# Patient Record
Sex: MALE | Race: WHITE | NOT HISPANIC OR LATINO | Employment: OTHER | URBAN - METROPOLITAN AREA
[De-identification: names, ages, dates, MRNs, and addresses within clinical notes are randomized per-mention and may not be internally consistent; named-entity substitution may affect disease eponyms.]

---

## 2017-03-29 ENCOUNTER — ALLSCRIPTS OFFICE VISIT (OUTPATIENT)
Dept: OTHER | Facility: OTHER | Age: 62
End: 2017-03-29

## 2017-03-29 ENCOUNTER — APPOINTMENT (OUTPATIENT)
Dept: LAB | Facility: CLINIC | Age: 62
End: 2017-03-29
Payer: COMMERCIAL

## 2017-03-29 DIAGNOSIS — E78.5 HYPERLIPIDEMIA: ICD-10-CM

## 2017-03-29 DIAGNOSIS — I10 ESSENTIAL (PRIMARY) HYPERTENSION: ICD-10-CM

## 2017-03-29 DIAGNOSIS — Z12.11 ENCOUNTER FOR SCREENING FOR MALIGNANT NEOPLASM OF COLON: ICD-10-CM

## 2017-03-29 DIAGNOSIS — G40.209 LOCALZ-RLTD SYMPTOMATIC EPILEPSY W CMPLX PART SZ, NOTINTRAC, WO STATUS (HCC): ICD-10-CM

## 2017-03-29 DIAGNOSIS — E55.9 VITAMIN D DEFICIENCY: ICD-10-CM

## 2017-03-29 DIAGNOSIS — I71.2 THORACIC AORTIC ANEURYSM WITHOUT RUPTURE (HCC): ICD-10-CM

## 2017-03-29 LAB
25(OH)D3 SERPL-MCNC: 31.9 NG/ML (ref 30–100)
ALBUMIN SERPL BCP-MCNC: 3.8 G/DL (ref 3.5–5)
ALP SERPL-CCNC: 47 U/L (ref 46–116)
ALT SERPL W P-5'-P-CCNC: 19 U/L (ref 12–78)
ANION GAP SERPL CALCULATED.3IONS-SCNC: 9 MMOL/L (ref 4–13)
AST SERPL W P-5'-P-CCNC: 15 U/L (ref 5–45)
BASOPHILS # BLD AUTO: 0.04 THOUSANDS/ΜL (ref 0–0.1)
BASOPHILS NFR BLD AUTO: 1 % (ref 0–1)
BILIRUB SERPL-MCNC: 0.56 MG/DL (ref 0.2–1)
BUN SERPL-MCNC: 17 MG/DL (ref 5–25)
CALCIUM SERPL-MCNC: 8.7 MG/DL (ref 8.3–10.1)
CHLORIDE SERPL-SCNC: 104 MMOL/L (ref 100–108)
CHOLEST SERPL-MCNC: 147 MG/DL (ref 50–200)
CO2 SERPL-SCNC: 27 MMOL/L (ref 21–32)
CREAT SERPL-MCNC: 0.8 MG/DL (ref 0.6–1.3)
EOSINOPHIL # BLD AUTO: 0.1 THOUSAND/ΜL (ref 0–0.61)
EOSINOPHIL NFR BLD AUTO: 2 % (ref 0–6)
ERYTHROCYTE [DISTWIDTH] IN BLOOD BY AUTOMATED COUNT: 13.1 % (ref 11.6–15.1)
GFR SERPL CREATININE-BSD FRML MDRD: >60 ML/MIN/1.73SQ M
GLUCOSE P FAST SERPL-MCNC: 83 MG/DL (ref 65–99)
HCT VFR BLD AUTO: 41 % (ref 36.5–49.3)
HDLC SERPL-MCNC: 41 MG/DL (ref 40–60)
HGB BLD-MCNC: 13.4 G/DL (ref 12–17)
LDLC SERPL CALC-MCNC: 97 MG/DL (ref 0–100)
LYMPHOCYTES # BLD AUTO: 1.45 THOUSANDS/ΜL (ref 0.6–4.47)
LYMPHOCYTES NFR BLD AUTO: 33 % (ref 14–44)
MCH RBC QN AUTO: 30.5 PG (ref 26.8–34.3)
MCHC RBC AUTO-ENTMCNC: 32.7 G/DL (ref 31.4–37.4)
MCV RBC AUTO: 93 FL (ref 82–98)
MONOCYTES # BLD AUTO: 0.4 THOUSAND/ΜL (ref 0.17–1.22)
MONOCYTES NFR BLD AUTO: 9 % (ref 4–12)
NEUTROPHILS # BLD AUTO: 2.35 THOUSANDS/ΜL (ref 1.85–7.62)
NEUTS SEG NFR BLD AUTO: 55 % (ref 43–75)
NRBC BLD AUTO-RTO: 0 /100 WBCS
PLATELET # BLD AUTO: 232 THOUSANDS/UL (ref 149–390)
PMV BLD AUTO: 11.1 FL (ref 8.9–12.7)
POTASSIUM SERPL-SCNC: 4.6 MMOL/L (ref 3.5–5.3)
PROT SERPL-MCNC: 6.9 G/DL (ref 6.4–8.2)
RBC # BLD AUTO: 4.39 MILLION/UL (ref 3.88–5.62)
SODIUM SERPL-SCNC: 140 MMOL/L (ref 136–145)
TRIGL SERPL-MCNC: 44 MG/DL
TSH SERPL DL<=0.05 MIU/L-ACNC: 2.49 UIU/ML (ref 0.36–3.74)
WBC # BLD AUTO: 4.34 THOUSAND/UL (ref 4.31–10.16)

## 2017-03-29 PROCEDURE — 80053 COMPREHEN METABOLIC PANEL: CPT

## 2017-03-29 PROCEDURE — 82306 VITAMIN D 25 HYDROXY: CPT

## 2017-03-29 PROCEDURE — 36415 COLL VENOUS BLD VENIPUNCTURE: CPT

## 2017-03-29 PROCEDURE — 85025 COMPLETE CBC W/AUTO DIFF WBC: CPT

## 2017-03-29 PROCEDURE — 84443 ASSAY THYROID STIM HORMONE: CPT

## 2017-03-29 PROCEDURE — 80061 LIPID PANEL: CPT

## 2017-03-31 ENCOUNTER — GENERIC CONVERSION - ENCOUNTER (OUTPATIENT)
Dept: OTHER | Facility: OTHER | Age: 62
End: 2017-03-31

## 2017-04-10 ENCOUNTER — ALLSCRIPTS OFFICE VISIT (OUTPATIENT)
Dept: OTHER | Facility: OTHER | Age: 62
End: 2017-04-10

## 2017-05-18 ENCOUNTER — ALLSCRIPTS OFFICE VISIT (OUTPATIENT)
Dept: OTHER | Facility: OTHER | Age: 62
End: 2017-05-18

## 2017-05-18 ENCOUNTER — TRANSCRIBE ORDERS (OUTPATIENT)
Dept: ADMINISTRATIVE | Facility: HOSPITAL | Age: 62
End: 2017-05-18

## 2017-05-18 DIAGNOSIS — M51.26 DISPLACEMENT OF LUMBAR INTERVERTEBRAL DISC WITHOUT MYELOPATHY: Primary | ICD-10-CM

## 2017-05-18 DIAGNOSIS — M48.061 NEURAL FORAMINAL STENOSIS OF LUMBAR SPINE: ICD-10-CM

## 2017-06-02 ENCOUNTER — GENERIC CONVERSION - ENCOUNTER (OUTPATIENT)
Dept: OTHER | Facility: OTHER | Age: 62
End: 2017-06-02

## 2017-06-08 ENCOUNTER — TRANSCRIBE ORDERS (OUTPATIENT)
Dept: NEUROLOGY | Facility: HOSPITAL | Age: 62
End: 2017-06-08

## 2017-06-08 ENCOUNTER — ALLSCRIPTS OFFICE VISIT (OUTPATIENT)
Dept: OTHER | Facility: OTHER | Age: 62
End: 2017-06-08

## 2017-06-08 DIAGNOSIS — M54.16 RADICULOPATHY OF LUMBAR REGION: ICD-10-CM

## 2017-06-14 ENCOUNTER — APPOINTMENT (OUTPATIENT)
Dept: PHYSICAL THERAPY | Facility: REHABILITATION | Age: 62
End: 2017-06-14
Payer: COMMERCIAL

## 2017-06-15 ENCOUNTER — APPOINTMENT (OUTPATIENT)
Dept: PHYSICAL THERAPY | Facility: REHABILITATION | Age: 62
End: 2017-06-15
Payer: COMMERCIAL

## 2017-06-15 ENCOUNTER — GENERIC CONVERSION - ENCOUNTER (OUTPATIENT)
Dept: OTHER | Facility: OTHER | Age: 62
End: 2017-06-15

## 2017-06-15 DIAGNOSIS — M54.16 RADICULOPATHY OF LUMBAR REGION: ICD-10-CM

## 2017-06-15 PROCEDURE — 97162 PT EVAL MOD COMPLEX 30 MIN: CPT

## 2017-06-15 PROCEDURE — 97110 THERAPEUTIC EXERCISES: CPT

## 2017-06-21 ENCOUNTER — APPOINTMENT (OUTPATIENT)
Dept: PHYSICAL THERAPY | Facility: REHABILITATION | Age: 62
End: 2017-06-21
Payer: COMMERCIAL

## 2017-06-21 PROCEDURE — 97140 MANUAL THERAPY 1/> REGIONS: CPT

## 2017-06-21 PROCEDURE — 97110 THERAPEUTIC EXERCISES: CPT

## 2017-06-26 ENCOUNTER — APPOINTMENT (OUTPATIENT)
Dept: PHYSICAL THERAPY | Facility: REHABILITATION | Age: 62
End: 2017-06-26
Payer: COMMERCIAL

## 2017-06-26 PROCEDURE — 97140 MANUAL THERAPY 1/> REGIONS: CPT

## 2017-06-26 PROCEDURE — 97110 THERAPEUTIC EXERCISES: CPT

## 2017-06-28 ENCOUNTER — APPOINTMENT (OUTPATIENT)
Dept: PHYSICAL THERAPY | Facility: REHABILITATION | Age: 62
End: 2017-06-28
Payer: COMMERCIAL

## 2017-06-28 PROCEDURE — 97110 THERAPEUTIC EXERCISES: CPT

## 2017-07-18 ENCOUNTER — ALLSCRIPTS OFFICE VISIT (OUTPATIENT)
Dept: OTHER | Facility: OTHER | Age: 62
End: 2017-07-18

## 2017-08-10 ENCOUNTER — GENERIC CONVERSION - ENCOUNTER (OUTPATIENT)
Dept: OTHER | Facility: OTHER | Age: 62
End: 2017-08-10

## 2017-09-18 ENCOUNTER — ALLSCRIPTS OFFICE VISIT (OUTPATIENT)
Dept: OTHER | Facility: OTHER | Age: 62
End: 2017-09-18

## 2017-09-18 DIAGNOSIS — M53.3 SACROCOCCYGEAL DISORDERS, NOT ELSEWHERE CLASSIFIED: ICD-10-CM

## 2017-09-18 DIAGNOSIS — G40.909 EPILEPSY WITHOUT STATUS EPILEPTICUS, NOT INTRACTABLE (HCC): ICD-10-CM

## 2017-09-18 DIAGNOSIS — Z12.5 ENCOUNTER FOR SCREENING FOR MALIGNANT NEOPLASM OF PROSTATE: ICD-10-CM

## 2017-09-18 DIAGNOSIS — E78.5 HYPERLIPIDEMIA: ICD-10-CM

## 2017-09-18 DIAGNOSIS — M25.551 PAIN IN RIGHT HIP: ICD-10-CM

## 2017-09-18 DIAGNOSIS — I10 ESSENTIAL (PRIMARY) HYPERTENSION: ICD-10-CM

## 2017-09-18 DIAGNOSIS — Z00.00 ENCOUNTER FOR GENERAL ADULT MEDICAL EXAMINATION WITHOUT ABNORMAL FINDINGS: ICD-10-CM

## 2017-09-20 ENCOUNTER — TRANSCRIBE ORDERS (OUTPATIENT)
Dept: RADIOLOGY | Facility: HOSPITAL | Age: 62
End: 2017-09-20

## 2017-09-20 ENCOUNTER — HOSPITAL ENCOUNTER (OUTPATIENT)
Dept: RADIOLOGY | Facility: HOSPITAL | Age: 62
Discharge: HOME | End: 2017-09-20
Payer: COMMERCIAL

## 2017-09-20 DIAGNOSIS — M53.3 SACROCOCCYGEAL DISORDERS, NOT ELSEWHERE CLASSIFIED: ICD-10-CM

## 2017-09-20 DIAGNOSIS — M25.551 PAIN IN RIGHT HIP: ICD-10-CM

## 2017-09-20 PROCEDURE — 72200 X-RAY EXAM SI JOINTS: CPT

## 2017-09-20 PROCEDURE — 72110 X-RAY EXAM L-2 SPINE 4/>VWS: CPT

## 2017-09-20 PROCEDURE — 73502 X-RAY EXAM HIP UNI 2-3 VIEWS: CPT

## 2017-09-21 ENCOUNTER — APPOINTMENT (OUTPATIENT)
Dept: LAB | Facility: CLINIC | Age: 62
End: 2017-09-21
Payer: COMMERCIAL

## 2017-09-21 DIAGNOSIS — E78.5 HYPERLIPIDEMIA: ICD-10-CM

## 2017-09-21 DIAGNOSIS — Z12.5 ENCOUNTER FOR SCREENING FOR MALIGNANT NEOPLASM OF PROSTATE: ICD-10-CM

## 2017-09-21 DIAGNOSIS — I10 ESSENTIAL (PRIMARY) HYPERTENSION: ICD-10-CM

## 2017-09-21 DIAGNOSIS — G40.909 EPILEPSY WITHOUT STATUS EPILEPTICUS, NOT INTRACTABLE (HCC): ICD-10-CM

## 2017-09-21 DIAGNOSIS — Z00.00 ENCOUNTER FOR GENERAL ADULT MEDICAL EXAMINATION WITHOUT ABNORMAL FINDINGS: ICD-10-CM

## 2017-09-21 LAB
ALBUMIN SERPL BCP-MCNC: 3.6 G/DL (ref 3.5–5)
ALP SERPL-CCNC: 45 U/L (ref 46–116)
ALT SERPL W P-5'-P-CCNC: 14 U/L (ref 12–78)
ANION GAP SERPL CALCULATED.3IONS-SCNC: 8 MMOL/L (ref 4–13)
AST SERPL W P-5'-P-CCNC: 18 U/L (ref 5–45)
BILIRUB SERPL-MCNC: 0.46 MG/DL (ref 0.2–1)
BUN SERPL-MCNC: 16 MG/DL (ref 5–25)
CALCIUM SERPL-MCNC: 8.4 MG/DL (ref 8.3–10.1)
CHLORIDE SERPL-SCNC: 104 MMOL/L (ref 100–108)
CHOLEST SERPL-MCNC: 149 MG/DL (ref 50–200)
CO2 SERPL-SCNC: 27 MMOL/L (ref 21–32)
CREAT SERPL-MCNC: 0.81 MG/DL (ref 0.6–1.3)
ERYTHROCYTE [DISTWIDTH] IN BLOOD BY AUTOMATED COUNT: 13.2 % (ref 11.6–15.1)
GFR SERPL CREATININE-BSD FRML MDRD: 97 ML/MIN/1.73SQ M
GLUCOSE P FAST SERPL-MCNC: 71 MG/DL (ref 65–99)
HCT VFR BLD AUTO: 41.7 % (ref 36.5–49.3)
HDLC SERPL-MCNC: 37 MG/DL (ref 40–60)
HGB BLD-MCNC: 13.5 G/DL (ref 12–17)
LDLC SERPL CALC-MCNC: 97 MG/DL (ref 0–100)
MCH RBC QN AUTO: 30.8 PG (ref 26.8–34.3)
MCHC RBC AUTO-ENTMCNC: 32.4 G/DL (ref 31.4–37.4)
MCV RBC AUTO: 95 FL (ref 82–98)
PLATELET # BLD AUTO: 198 THOUSANDS/UL (ref 149–390)
PMV BLD AUTO: 11.2 FL (ref 8.9–12.7)
POTASSIUM SERPL-SCNC: 3.9 MMOL/L (ref 3.5–5.3)
PROT SERPL-MCNC: 6.5 G/DL (ref 6.4–8.2)
PSA SERPL-MCNC: 1.5 NG/ML (ref 0–4)
RBC # BLD AUTO: 4.38 MILLION/UL (ref 3.88–5.62)
SODIUM SERPL-SCNC: 139 MMOL/L (ref 136–145)
TRIGL SERPL-MCNC: 77 MG/DL
TSH SERPL DL<=0.05 MIU/L-ACNC: 3.79 UIU/ML (ref 0.36–3.74)
WBC # BLD AUTO: 4.91 THOUSAND/UL (ref 4.31–10.16)

## 2017-09-21 PROCEDURE — 80053 COMPREHEN METABOLIC PANEL: CPT

## 2017-09-21 PROCEDURE — 36415 COLL VENOUS BLD VENIPUNCTURE: CPT

## 2017-09-21 PROCEDURE — 85027 COMPLETE CBC AUTOMATED: CPT

## 2017-09-21 PROCEDURE — 80061 LIPID PANEL: CPT

## 2017-09-21 PROCEDURE — 84443 ASSAY THYROID STIM HORMONE: CPT

## 2017-09-21 PROCEDURE — G0103 PSA SCREENING: HCPCS

## 2017-09-29 ENCOUNTER — ALLSCRIPTS OFFICE VISIT (OUTPATIENT)
Dept: OTHER | Facility: OTHER | Age: 62
End: 2017-09-29

## 2017-10-06 ENCOUNTER — GENERIC CONVERSION - ENCOUNTER (OUTPATIENT)
Dept: OTHER | Facility: OTHER | Age: 62
End: 2017-10-06

## 2017-10-10 ENCOUNTER — ALLSCRIPTS OFFICE VISIT (OUTPATIENT)
Dept: OTHER | Facility: OTHER | Age: 62
End: 2017-10-10
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
## 2017-10-11 NOTE — PROGRESS NOTES
Surgery Scheduling Form  Pre-Operative Risk Assessment:   Date Last Seen: 10/10/2017   Date of Surgery: 10/16/2017   Hospital: [facility]   Type of Surgery: Total right hip replacement   Surgeon Name: Joe Ortiz Office Number: [phone]  Fax Number: [phone]     Pulmonary: No Pulmonary Contraindication for planned surgical procedure   Cardiac: No Cardiac Contraindication for planned surgical procedure   Vascular: No Vascular Contraindication for planned surgical procedure   Further Testing Required: No   Anticoagulation: No   Anesthesia: Choice   Patient Approved for Surgery: Yes   Clearing Doctor: Roberth Robertson MD      Signatures   Electronically signed by : Lindsay Enamorado, ; Oct 10 2017  8:13AM EST                       (Author)    Electronically signed by : ALEKSANDR Harrison ; Oct 10 2017  8:26AM EST                       (Author)    Electronically signed by : ALEKSANDR Harrison ; Oct 10 2017  8:26AM EST                       (Author)

## 2017-11-20 ENCOUNTER — GENERIC CONVERSION - ENCOUNTER (OUTPATIENT)
Dept: FAMILY MEDICINE CLINIC | Facility: CLINIC | Age: 62
End: 2017-11-20

## 2018-01-09 NOTE — RESULT NOTES
Message   Please call patient, all his blood work was normal     Verified Results  (1) CBC/PLT/DIFF [id number] 08:14AM Laura Atkins Order Number: [id number]     Test Name Result Flag Reference   WBC COUNT 4 34 Thousand/uL  4 31-10 16   RBC COUNT 4 39 Million/uL  3 88-5 62   HEMOGLOBIN 13 4 g/dL  12 0-17 0   HEMATOCRIT 41 0 %  36 5-49 3   MCV 93 fL  82-98   MCH 30 5 pg  26 8-34 3   MCHC 32 7 g/dL  31 4-37 4   RDW 13 1 %  11 6-15 1   MPV 11 1 fL  8 9-12 7   PLATELET COUNT 482 Thousands/uL  149-390   nRBC AUTOMATED 0 /100 WBCs     NEUTROPHILS RELATIVE PERCENT 55 %  43-75   LYMPHOCYTES RELATIVE PERCENT 33 %  14-44   MONOCYTES RELATIVE PERCENT 9 %  4-12   EOSINOPHILS RELATIVE PERCENT 2 %  0-6   BASOPHILS RELATIVE PERCENT 1 %  0-1   NEUTROPHILS ABSOLUTE COUNT 2 35 Thousands/? ??L  1 85-7 62   LYMPHOCYTES ABSOLUTE COUNT 1 45 Thousands/? ??L  0 60-4 47   MONOCYTES ABSOLUTE COUNT 0 40 Thousand/? ??L  0 17-1 22   EOSINOPHILS ABSOLUTE COUNT 0 10 Thousand/? ??L  0 00-0 61   BASOPHILS ABSOLUTE COUNT 0 04 Thousands/? ??L  0 00-0 10   - Patient Instructions: This bloodwork is non-fasting  Please drink two glasses of water morning of bloodwork  - Patient Instructions: This bloodwork is non-fasting  Please drink two glasses of water morning of bloodwork       (1) COMPREHENSIVE METABOLIC PANEL [id number] 24:48RL [facility] Order Number: [id number]     Test Name Result Flag Reference   SODIUM 140 mmol/L  136-145   POTASSIUM 4 6 mmol/L  3 5-5 3   CHLORIDE 104 mmol/L  100-108   CARBON DIOXIDE 27 mmol/L  21-32   ANION GAP (CALC) 9 mmol/L  4-13   BLOOD UREA NITROGEN 17 mg/dL  5-25   CREATININE 0 80 mg/dL  0 60-1 30   Standardized to IDMS reference method   CALCIUM 8 7 mg/dL  8 3-10 1   BILI, TOTAL 0 56 mg/dL  0 20-1 00   ALK PHOSPHATAS 47 U/L     ALT (SGPT) 19 U/L  12-78   AST(SGOT) 15 U/L  5-45   ALBUMIN 3 8 g/dL  3 5-5 0   TOTAL PROTEIN 6 9 g/dL  6 4-8 2   eGFR Non- >60 0 ml/min/1 73sq m   - Patient Instructions: This is a fasting blood test  Water, black tea or black coffee only after 9:00pm the night before test Drink 2 glasses of water the morning of test - Patient Instructions: This bloodwork is non-fasting  Please drink two glasses of   water morning of bloodwork  National Kidney Disease Education Program recommendations are as follows:  GFR calculation is accurate only with a steady state creatinine  Chronic Kidney disease less than 60 ml/min/1 73 sq  meters  Kidney failure less than 15 ml/min/1 73 sq  meters  GLUCOSE FASTING 83 mg/dL  65-99     (1) TSH [id number] 08:14AM See Raygoza    Order Number: [id number]     Test Name Result Flag Reference   TSH 2 490 uIU/mL  0 358-3 740   - Patient Instructions: This bloodwork is non-fasting  Please drink two glasses of water morning of bloodwork  - Patient Instructions: This is a fasting blood test  Water, black tea or black coffee only after 9:00pm the night before test Drink 2 glasses of water the morning of test - Patient Instructions: This bloodwork is non-fasting  Please drink two glasses of   water morning of bloodwork  Patients undergoing fluorescein dye angiography may retain small amounts of fluorescein in the body for 48-72 hours post procedure  Samples containing fluorescein can produce falsely depressed TSH values  If the patient had this procedure,a specimen should be resubmitted post fluorescein clearance  (1) LIPID PANEL FASTING W DIRECT LDL REFLEX [id number] 08:14AM Ritika Soares Order Number: [id number]     Test Name Result Flag Reference   CHOLESTEROL 147 mg/dL     LDL CHOLESTEROL CALCULATED 97 mg/dL  0-100   - Patient Instructions: This is a fasting blood test  Water, black tea or black coffee only after 9:00pm the night before test   Drink 2 glasses of water the morning of test     - Patient Instructions:  This is a fasting blood test  Water, black tea or black coffee only after 9:00pm the night before test Drink 2 glasses of water the morning of test - Patient Instructions: This bloodwork is non-fasting  Please drink two glasses of   water morning of bloodwork  Triglyceride:         Normal              <150 mg/dl       Borderline High    150-199 mg/dl       High               200-499 mg/dl       Very High          >499 mg/dl  Cholesterol:         Desirable        <200 mg/dl      Borderline High  200-239 mg/dl      High             >239 mg/dl  HDL Cholesterol:        High    >59 mg/dL      Low     <41 mg/dL  LDL Cholesterol:        Optimal          <100 mg/dl        Near Optimal     100-129 mg/dl        Above Optimal          Borderline High   130-159 mg/dl          High              160-189 mg/dl          Very High        >189 mg/dl  LDL CALCULATED:    This screening LDL is a calculated result  It does not have the accuracy of the Direct Measured LDL in the monitoring of patients with hyperlipidemia and/or statin therapy  Direct Measure LDL (ZHD606) must be ordered separately in these patients  TRIGLYCERIDES 44 mg/dL  <=150   Specimen collection should occur prior to N-Acetylcysteine or Metamizole administration due to the potential for falsely depressed results  HDL,DIRECT 41 mg/dL  40-60   Specimen collection should occur prior to Metamizole administration due to the potential for falsely depressed results  (1) VITAMIN D 25-HYDROXY [id number] 08:14AM Deric Saez    Order Number: [id number]     Test Name Result Flag Reference   VIT D 25-HYDROX 31 9 ng/mL  30 0-100 0   This assay is a certified procedure of the CDC Vitamin D Standardization Certification Program (VDSCP)     Deficiency <20ng/ml   Insufficiency 20-30ng/ml   Sufficient  ng/ml     *Patients undergoing fluorescein dye angiography may retain small amounts of fluorescein in the body for 48-72 hours post procedure   Samples containing fluorescein can produce falsely elevated Vitamin D values  If the patient had this procedure, a specimen should be resubmitted post fluorescein clearance  Signatures   Electronically signed by :  ALEKSANDR Arteaga ; Mar 31 2017  5:20PM EST                       (Author)
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
## 2018-01-11 NOTE — PROGRESS NOTES
Patient Health Assessment    Date:            09/01/2016  Blood Pressure:  0/0  Pulse:           0  Age:             63  Weight:          190 lbs  Height/Length:   6' 2"  Body Mass Index: 24 3  Provider:        [email or web address]  [facility]: Artificial Joints    Epilepsy    Fainting    Heart Condition    High Blood Pressure    Stroke    Seizures  Medications: Aspirin    Lisinopril/Hydrochlorothiazide    Gabapentin    ezetimibe 10 MG [Zetia]    Unknow ( Patient to bring Medication list)    Meloxicam  Allergies:  Since Last Visit: Medical Alert: No Change    Medications: No Change    Allergies:        No Change  Pain Scale Type: Numeric Pain ScalePain Level: 0  Description:    Edited Patient Health Assessment    Date:            09/01/2016  Blood Pressure:  115/66  Pulse:           61  Age:             61  Weight:          190 lbs  Height/Length:   6' 2"  Body Mass Index: 24 3  Provider:        [email or web address]  Clinic:          [place]      Pt presents for periodic exam  Completed oral cancer screening, no abnormal  findings  Reviewed PMH, no changes  Obtained BWs and reviewed findings  Completed restorative exam and perio spot probing  Pt  tx planned for SRP and  crowns on #19 and #30  Pt left satisfied and ambulatory      NV: SRP UR    AH/MQ    ----- Signed on Thursday, September 01, 2016 at 9:16:28 AM  -----  ----- Provider: [email or web address] - [facility], Dentist -- Clinic: [facility] -----
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
## 2018-01-12 VITALS
HEIGHT: 74 IN | WEIGHT: 186 LBS | SYSTOLIC BLOOD PRESSURE: 110 MMHG | BODY MASS INDEX: 23.87 KG/M2 | DIASTOLIC BLOOD PRESSURE: 80 MMHG | HEART RATE: 73 BPM
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
## 2018-01-13 VITALS
WEIGHT: 185.5 LBS | SYSTOLIC BLOOD PRESSURE: 130 MMHG | HEIGHT: 74 IN | DIASTOLIC BLOOD PRESSURE: 85 MMHG | HEART RATE: 72 BPM | BODY MASS INDEX: 23.81 KG/M2

## 2018-01-13 VITALS
BODY MASS INDEX: 24.26 KG/M2 | WEIGHT: 189 LBS | HEART RATE: 72 BPM | RESPIRATION RATE: 14 BRPM | HEIGHT: 74 IN | TEMPERATURE: 97.5 F | SYSTOLIC BLOOD PRESSURE: 124 MMHG | DIASTOLIC BLOOD PRESSURE: 74 MMHG

## 2018-01-13 VITALS
HEART RATE: 64 BPM | BODY MASS INDEX: 23.87 KG/M2 | HEIGHT: 74 IN | DIASTOLIC BLOOD PRESSURE: 70 MMHG | SYSTOLIC BLOOD PRESSURE: 104 MMHG | WEIGHT: 186 LBS

## 2018-01-14 VITALS
RESPIRATION RATE: 14 BRPM | DIASTOLIC BLOOD PRESSURE: 78 MMHG | SYSTOLIC BLOOD PRESSURE: 124 MMHG | WEIGHT: 187.5 LBS | BODY MASS INDEX: 24.06 KG/M2 | OXYGEN SATURATION: 97 % | HEIGHT: 74 IN | HEART RATE: 76 BPM

## 2018-01-14 VITALS
BODY MASS INDEX: 24.26 KG/M2 | HEIGHT: 74 IN | HEART RATE: 80 BPM | RESPIRATION RATE: 16 BRPM | SYSTOLIC BLOOD PRESSURE: 100 MMHG | TEMPERATURE: 96.5 F | DIASTOLIC BLOOD PRESSURE: 60 MMHG | WEIGHT: 189 LBS

## 2018-01-14 VITALS
HEART RATE: 53 BPM | HEIGHT: 74 IN | SYSTOLIC BLOOD PRESSURE: 110 MMHG | BODY MASS INDEX: 23.87 KG/M2 | WEIGHT: 186 LBS | DIASTOLIC BLOOD PRESSURE: 60 MMHG

## 2018-01-15 NOTE — PROGRESS NOTES
Patient Health Assessment    Date:            12/05/2016  Blood Pressure:  103/73  Pulse:           77  Age:             63  Weight:          195 lbs  Height/Length:   6' 2"  Body Mass Index: 25 0  Provider:        WEST  Clinic:          [facility]: [facility]    Epilepsy    Fainting    Heart Condition    High Blood Pressure    Stroke    Seizures  Medications: Peridex 0 12 %    Clindamycin HCl 300 MG    Aspirin    Lisinopril/Hydrochlorothiazide    Gabapentin    ezetimibe 10 MG [Zetia]    Unknow ( Patient to bring Medication list)    Meloxicam  Allergies:  Since Last Visit: Medical Alert: No Change    Medications: No Change    Allergies:        No Change  Pain Scale Type: Numeric Pain ScalePain Level: 0  Description:  Pt presents for SRP of upper left  Reviewed PMH, no changes  Pt took  pre-medication  Applied topical benzocaine, administered 1 5 carps 2% lido  1:100k epi via infiltration  SRP completed with ultrasonic  and hand  scalers  Supra and subgingival calculus removed  Adequate hemostasis  achieved  Pt left satisfied and ambulatory      NV: Hauser prep #19 (pt aware needs to pay before starting)    NATAN/LILA    ----- Signed on Monday, December 05, 2016 at 9:26:11 AM  -----  ----- Provider: [email or web address] - [facility], Dentist -- Clinic: [place] -----

## 2018-01-15 NOTE — PROGRESS NOTES
Patient Health Assessment    Date:            11/21/2016  Blood Pressure:  104/69  Pulse:           62  Age:             63  Weight:          0 lbs  Height/Length:   0' 0"  Body Mass Index: 0 0  Provider:        WEST  Clinic:          [facility] [address] [address]: [facility]    Epilepsy    Fainting    Heart Condition    High Blood Pressure    Stroke    Seizures  Medications: Peridex 0 12 %    Clindamycin HCl 300 MG    Aspirin    Lisinopril/Hydrochlorothiazide    Gabapentin    ezetimibe 10 MG [Zetia]    Unknow ( Patient to bring Medication list)    Meloxicam  Allergies:  Since Last Visit: Medical Alert: No Change    Medications: No Change    Allergies:        No Change  Pain Scale Type: Numeric Pain ScalePain Level: 0  Description: Pt said that he took 2 tablets of 300 mg each of clindamycin 1  hr before the appt  Reviewed the med hx  No changes  Administered 2 5  carpules of 3% CArbocaine for the upper right  Completed sc/rp for upper  right using hand scalers and Cavitron  Polished teeth using polishing paste and   cup  Pt has Clindamycin for next visit  He said that he is using peridex and  he only has half a bottle left  Re-enforced oral hygiene  Pt left in good  health    N V Sc/Rp ANNY COREAS/Dr SCHMITT    ----- Signed on Monday, November 21, 2016 at 9:19:18 AM  -----  ----- Provider: MARY - [facility], Dentist -- Clinic: [facility]  -----

## 2018-01-16 NOTE — MISCELLANEOUS
Signatures   Electronically signed by : ALEKSANDR Castillo ; Jul 18 2017  8:03AM EST                       (Author)

## 2018-01-17 NOTE — PROGRESS NOTES
Patient Health Assessment    Date:            08/10/2017  Blood Pressure:  107/75  Pulse:           62  Age:             62  Weight:          186 lbs  Height/Length:   6' 2"  Body Mass Index: 23 8  Provider:        WEST  Clinic:          [facility]: [facility]    Epilepsy    Fainting    Heart Condition    High Blood Pressure    Stroke    Seizures  Medications: Peridex 0 12 %    Clindamycin HCl 300 MG    Aspirin    Lisinopril/Hydrochlorothiazide    Gabapentin    ezetimibe 10 MG [Zetia]    Unknow ( Patient to bring Medication list)    Meloxicam  Allergies:  Since Last Visit: Medical Alert: No Change    Medications: No Change    Allergies:        No Change  Pain Scale Type: Numeric Pain ScalePain Level: 0  Description:  Pt presents for final impression PFM crown #30  PMH reviewed, no changes  Patient did not take his pre-medication, gave him chairside 600 mg clindamycin  Applied topical benzocaine, administered 1 carpules 4% Septocaine 1:100k  epi via infiltration   Provisional crown removed, cement removed from prep  and provisional  Mesial contact of prep increased taper  Packed size 00 cord  soaked in hemodent  Waited 5 minutes  Air dried,  Impression made with  Delkit light and heavy body using triple tray  Impression removed after 5  min, confirmed preparation captured with no voids or distortions  Cemented  provisional crown using Provicell  Removed excess cement, occlusion and  contacts verified  Selected porcelain shade D3  , pt confirmed via pt mirror  Pt comfortable with bite, left satisfied and ambulatory      NV: delivery of PFM crown #30    MERARI/MQ    ----- Signed on Thursday, August 10, 2017 at 11:50:14 AM  -----  ----- Provider: DAMIAN_SABRINA - [facility], Dentist -- Clinic: [place]  -----

## 2018-02-19 RX ORDER — DICLOFENAC SODIUM 75 MG/1
TABLET, DELAYED RELEASE ORAL
Qty: 60 TABLET | Refills: 1 | OUTPATIENT
Start: 2018-02-19

## 2018-03-25 DIAGNOSIS — I10 ESSENTIAL HYPERTENSION: Primary | ICD-10-CM

## 2018-03-26 RX ORDER — LISINOPRIL 5 MG/1
TABLET ORAL
Qty: 90 TABLET | Refills: 1 | Status: SHIPPED | OUTPATIENT
Start: 2018-03-26 | End: 2018-09-14 | Stop reason: SDUPTHER

## 2018-04-03 DIAGNOSIS — E78.5 HYPERLIPIDEMIA, UNSPECIFIED HYPERLIPIDEMIA TYPE: Primary | ICD-10-CM

## 2018-04-04 ENCOUNTER — TELEPHONE (OUTPATIENT)
Dept: FAMILY MEDICINE CLINIC | Facility: CLINIC | Age: 63
End: 2018-04-04

## 2018-04-04 RX ORDER — EZETIMIBE 10 MG/1
TABLET ORAL
Qty: 30 TABLET | Refills: 1 | Status: SHIPPED | OUTPATIENT
Start: 2018-04-04 | End: 2018-06-13 | Stop reason: SDUPTHER

## 2018-04-04 NOTE — TELEPHONE ENCOUNTER
Please contact patient  I just refilled his medication  I have not seen him in the office since September  He needs to schedule checkup    Thank you

## 2018-04-16 ENCOUNTER — TELEPHONE (OUTPATIENT)
Dept: FAMILY MEDICINE CLINIC | Facility: CLINIC | Age: 63
End: 2018-04-16

## 2018-04-16 ENCOUNTER — OFFICE VISIT (OUTPATIENT)
Dept: NEUROLOGY | Facility: CLINIC | Age: 63
End: 2018-04-16
Payer: COMMERCIAL

## 2018-04-16 ENCOUNTER — OFFICE VISIT (OUTPATIENT)
Dept: FAMILY MEDICINE CLINIC | Facility: CLINIC | Age: 63
End: 2018-04-16
Payer: COMMERCIAL

## 2018-04-16 VITALS
BODY MASS INDEX: 25.95 KG/M2 | SYSTOLIC BLOOD PRESSURE: 102 MMHG | TEMPERATURE: 97.5 F | WEIGHT: 202.2 LBS | HEART RATE: 76 BPM | RESPIRATION RATE: 16 BRPM | HEIGHT: 74 IN | DIASTOLIC BLOOD PRESSURE: 70 MMHG

## 2018-04-16 VITALS
RESPIRATION RATE: 16 BRPM | BODY MASS INDEX: 25.91 KG/M2 | HEART RATE: 67 BPM | WEIGHT: 201.8 LBS | DIASTOLIC BLOOD PRESSURE: 87 MMHG | SYSTOLIC BLOOD PRESSURE: 128 MMHG

## 2018-04-16 DIAGNOSIS — M51.37 DDD (DEGENERATIVE DISC DISEASE), LUMBOSACRAL: ICD-10-CM

## 2018-04-16 DIAGNOSIS — I71.2 ANEURYSM OF ASCENDING AORTA (HCC): ICD-10-CM

## 2018-04-16 DIAGNOSIS — G89.4 CHRONIC PAIN DISORDER: ICD-10-CM

## 2018-04-16 DIAGNOSIS — E78.5 HYPERLIPIDEMIA, UNSPECIFIED HYPERLIPIDEMIA TYPE: ICD-10-CM

## 2018-04-16 DIAGNOSIS — M50.30 DDD (DEGENERATIVE DISC DISEASE), CERVICAL: Primary | ICD-10-CM

## 2018-04-16 DIAGNOSIS — M50.30 DDD (DEGENERATIVE DISC DISEASE), CERVICAL: ICD-10-CM

## 2018-04-16 DIAGNOSIS — G40.209 COMPLEX PARTIAL SEIZURE EVOLVING TO GENERALIZED SEIZURE (HCC): Primary | ICD-10-CM

## 2018-04-16 DIAGNOSIS — I10 ESSENTIAL HYPERTENSION: ICD-10-CM

## 2018-04-16 PROCEDURE — 99215 OFFICE O/P EST HI 40 MIN: CPT | Performed by: FAMILY MEDICINE

## 2018-04-16 PROCEDURE — 99214 OFFICE O/P EST MOD 30 MIN: CPT | Performed by: PSYCHIATRY & NEUROLOGY

## 2018-04-16 RX ORDER — GABAPENTIN 300 MG/1
300 CAPSULE ORAL 2 TIMES DAILY
Qty: 60 CAPSULE | Refills: 5 | Status: SHIPPED | OUTPATIENT
Start: 2018-04-16 | End: 2018-04-17 | Stop reason: SDUPTHER

## 2018-04-16 RX ORDER — LEVETIRACETAM 500 MG/1
1000 TABLET ORAL EVERY 12 HOURS SCHEDULED
Qty: 360 TABLET | Refills: 3 | Status: SHIPPED | OUTPATIENT
Start: 2018-04-16 | End: 2019-05-02 | Stop reason: SDUPTHER

## 2018-04-16 RX ORDER — LEVETIRACETAM 500 MG/1
1000 TABLET ORAL EVERY 12 HOURS SCHEDULED
COMMUNITY
End: 2018-04-16 | Stop reason: SDUPTHER

## 2018-04-16 NOTE — ASSESSMENT & PLAN NOTE
He continues to be seizure free and side effect free on Levetiracetam monotherapy  Because he is doing well, I will not make any changes to his medications  He is not interested in changing his medications as well, since he has been doing well  --he will continue Levetiracetam 1000 mg twice a day

## 2018-04-16 NOTE — ASSESSMENT & PLAN NOTE
He will continue to follow with his other doctors  I discussed that I am not able to prescribe marijuana products for chronic pain and if he would require this, he likely would need to follow up with Pain Management

## 2018-04-16 NOTE — TELEPHONE ENCOUNTER
Per the pharmacist the patient was originally taking gabapentin 300mg 3 times daily  The pharmacist states that the patient's father informed her that he was taking 2 300mg tablets 3 times daily and is now out of the medication  A script was sent over today for 300mg tablets to be taken twice daily  Please advise

## 2018-04-16 NOTE — PROGRESS NOTES
Patient ID: Jose Sloan is a 63 y o  male with prior stroke and epilepsy, who is returning to Neurology office for follow up of his seizures  Assessment/Plan:    Complex partial seizure evolving to generalized seizure ([address] )  He continues to be seizure free and side effect free on Levetiracetam monotherapy  Because he is doing well, I will not make any changes to his medications  He is not interested in changing his medications as well, since he has been doing well  --he will continue Levetiracetam 1000 mg twice a day  DDD (degenerative disc disease), cervical  He will continue to follow with his other doctors  I discussed that I am not able to prescribe marijuana products for chronic pain and if he would require this, he likely would need to follow up with Pain Management  Chronic pain disorder  He will follow with pain management, as noted above  He will return to the office in about 1 year  Subjective:    HPI    Current seizure medications:  1  Levetiracetam 1000 mg twice a day  Other medications as per Epic  I last saw him in the office on 4/10/2017  At that time, he was doing well without any recurrent seizures, so no changes were made to his medications  Since his last appointment, he has continued to be without any recurrent seizures or side effects to Levetiracetam monotherapy  He does still have trouble with chronic back and neck pain with occasional numbness in his left leg and arm  He has started to smoke marijuana and feels that this is very helpful for his pain  The following portions of the patient's history were reviewed and updated as appropriate: allergies, current medications and problem list          Objective:    Blood pressure 128/87, pulse 67, resp  rate 16, weight 91 5 kg (201 lb 12 8 oz)  Physical Exam    Neurological Exam      ROS:    Review of Systems   HENT: Negative  Eyes: Negative  Respiratory: Negative  Cardiovascular: Negative  Gastrointestinal: Negative  Endocrine: Negative  Genitourinary: Negative  Musculoskeletal: Negative  Skin: Negative  Allergic/Immunologic: Negative  Neurological: Positive for weakness and numbness  Hematological: Negative      Psychiatric/Behavioral:        Depression

## 2018-04-16 NOTE — PATIENT INSTRUCTIONS
-- Continue to take Levetiracetam (Keppra) 1000 mg twice a day  -- continue to work with your other doctors for your pain management

## 2018-04-16 NOTE — TELEPHONE ENCOUNTER
Please call to confirm medication directions  The patient states you were decreasing the dosage but the script is showing you increased the dosage  Need clarification  Please call

## 2018-04-16 NOTE — PROGRESS NOTES
FAMILY PRACTICE OFFICE VISIT       NAME: Carlito Stein  AGE: 63 y o  SEX: male       : 1955        MRN: [id number]    DATE: 2018  TIME: 10:47 AM    Assessment and Plan     Problem List Items Addressed This Visit     DDD (degenerative disc disease), cervical - Primary    Relevant Medications    gabapentin (NEURONTIN) 300 mg capsule    Other Relevant Orders    MRI cervical spine wo contrast    DDD (degenerative disc disease), lumbosacral    Relevant Orders    MRI lumbar spine wo contrast    Hyperlipidemia    Relevant Orders    CBC    Comprehensive metabolic panel    TSH, 3rd generation    Lipid Panel with Direct LDL reflex    Hypertension    Relevant Orders    CBC    Comprehensive metabolic panel    TSH, 3rd generation    Lipid Panel with Direct LDL reflex    Aneurysm of ascending aorta (HCC)    Relevant Orders    CT chest w contrast       Patient presents for follow-up of chronic medical conditions  Hypertension-well controlled, continue Zestril  Hyperlipidemia-continue Zetia, will proceed with blood work  Aneurysm of ascending aorta  Patient is due for CT chest with contrast, advised to schedule  Chronic low back and neck pain, polyarticular arthralgias  Family history of fibromyalgia/mother  Extensive connective tissue disease workup that was performed back in 2017 was unremarkable  Patient underwent left hip replacement which provided significant relief of his left hip pain but he is still experiencing daily neck and back pain with right upper and right lower extremity radiculopathy  Present time I believe we should proceed with reimaging due to significant weakness of his right upper lower extremity as well as decreased DTRs on the right side  I advised patient to schedule MRI of C-spine and lumbar sacral spine  Will determine further course of treatment based on those studies  Follow-up 3-4 months and pending diagnostic results      There are no Patient Instructions on file for this visit  Chief Complaint     Chief Complaint   Patient presents with    Follow-up     Pt is here for follow up  Pt also has c/o body pains in his legs back and arms  History of Present Illness     Patient presents for follow-up of chronic medical conditions  He has been experiencing persistent neck and low back pain  Neck pain radiates down to the right arm  Low back pain radiates down to the right leg  Patient had hip replacement performed few months ago, he reports improvement of his left hip pain but otherwise is remaining in daily discomfort  Patient has been using gabapentin, reportedly 300 mg twice a day  Connective tissue disease blood work was unremarkable back in August of 2017 including negative CRP, negative rheumatoid factor, neg   WENCESLAO, negative HLA B27    weakness in right upper extremity and lower extremity   no headaches, no dizziness   sleeps in "fetal position ", otherwise back pain is exacerbated  Patient remains on medications for hyperlipidemia and hypertension, including Zestril and Zetia  No chest pain, palpitations, shortness of breath or dizziness  Patient remains on Keppra and is under care of [facility]  No recent blood work  He is due for CT chest with contrast for reevaluation of known ascending aortic aneurysm  Review of Systems   Review of Systems   Constitutional: Positive for fatigue  HENT: Negative  Respiratory: Negative  Cardiovascular: Negative  Gastrointestinal: Negative  Endocrine: Negative  Genitourinary: Negative  Musculoskeletal: Positive for arthralgias, back pain and gait problem  Skin: Negative  Neurological: Positive for weakness (Right lower extremity and right upper extremity)  Negative for dizziness, light-headedness, numbness and headaches  Psychiatric/Behavioral: Negative          Active Problem List     Patient Active Problem List   Diagnosis    DDD (degenerative disc disease), cervical    Complex partial seizure evolving to generalized seizure ([facility] [address] [facility]    Chronic pain disorder    Bulging lumbar disc    DDD (degenerative disc disease), lumbosacral    Hyperlipidemia    Hypertension    Aneurysm of ascending aorta ([facility] [address] )       Past Medical History:  Past Medical History:   Diagnosis Date    Anxiety disorder     Arthritis     Last assessed: 11/17/16    Asthma     Depression     Hypertension     Seizures ([facility] [address] [facility]     Stroke syndrome [facility])        Past Surgical History:  Past Surgical History:   Procedure Laterality Date    ANKLE SURGERY      COLONOSCOPY  2009    Due 2014    HERNIA REPAIR      TOTAL HIP ARTHROPLASTY Left 02/2014       Family History:  Family History   Problem Relation Age of Onset    Anxiety disorder Mother      Symptom/disorder    Hypertension Mother      Benign Essential    Mitral valve prolapse Mother      Echo/Systolic    PABLO disease Mother     Fibromyalgia Mother     Hyperlipidemia Mother     Diabetes Father      Mellitus    Cancer Brother     Stroke Family      CVA    Cancer Family     Sudden death Family      Instantaneous Cardiac Death    Other Family      Connective Tissue Defect       Social History:  Social History     Social History    Marital status:      Spouse name: N/A    Number of children: N/A    Years of education: Bachelor's Degree     Occupational History    Not on file       Social History Main Topics    Smoking status: Never Smoker    Smokeless tobacco: Never Used      Comment: Per [employer]: Former smoker    Alcohol use Yes      Comment: Occasionally    Drug use: Yes     Types: Marijuana    Sexual activity: Not on file     Other Topics Concern    Not on file     Social History Narrative    Daily coffee consumption: 7 cups/day    Per [employer]: Currently        Objective     Vitals:    04/16/18 1527   BP: 102/70   Pulse: 76   Resp: 16   Temp: 97 5 °F (36 4 °C)     Wt Readings from Last 3 Encounters:   04/16/18 91 7 kg (202 lb 3 2 oz)   04/16/18 91 5 kg (201 lb 12 8 oz)   10/10/17 84 4 kg (186 lb)       Physical Exam   Constitutional: He is oriented to person, place, and time  He appears well-developed and well-nourished  HENT:   Head: Normocephalic and atraumatic  Eyes: Conjunctivae are normal    Neck: Neck supple  Carotid bruit is not present  Cardiovascular: Normal rate, regular rhythm and normal heart sounds  No murmur heard  Pulmonary/Chest: Effort normal and breath sounds normal  No respiratory distress  He has no wheezes  He has no rales  Abdominal: Soft  Normal appearance and bowel sounds are normal  He exhibits no abdominal bruit  Musculoskeletal: Normal range of motion  Antalgic gait, limping on the right  DTRs:  Right lower extremity 0 to 1/4, left 2 to 3/4  Muscle strength right lower extremity 3/5, left 4-5  Upper extremity strength right 3 to 4/5 left 4 to 5/5  Neck:  Decreased range of motion, significant spasm  Tenderness along the lumbar spine, paraspinal spasm,  Positive straight leg rising on the right at 60-70 degrees   Neurological: He is alert and oriented to person, place, and time  Psychiatric: He has a normal mood and affect  His behavior is normal    Nursing note and vitals reviewed        Pertinent Laboratory/Diagnostic Studies:  Lab Results   Component Value Date    GLUCOSE 79 08/30/2016    BUN 16 09/21/2017    CREATININE 0 81 09/21/2017    CALCIUM 8 4 09/21/2017     09/21/2017    K 3 9 09/21/2017    CO2 27 09/21/2017     09/21/2017     Lab Results   Component Value Date    ALT 14 09/21/2017    AST 18 09/21/2017    ALKPHOS 45 (L) 09/21/2017    BILITOT 0 46 09/21/2017       Lab Results   Component Value Date    WBC 4 91 09/21/2017    HGB 13 5 09/21/2017    HCT 41 7 09/21/2017    MCV 95 09/21/2017     09/21/2017       No results found for: TSH    Lab Results   Component Value Date    CHOL 149 09/21/2017     Lab Results   Component Value Date    TRIG 77 09/21/2017 Lab Results   Component Value Date    HDL 37 (L) 09/21/2017     Lab Results   Component Value Date    LDLCALC 97 09/21/2017     Lab Results   Component Value Date    HGBA1C 4 9 03/18/2014       Results for orders placed or performed in visit on 09/21/17   CBC   Result Value Ref Range    WBC 4 91 4 31 - 10 16 Thousand/uL    RBC 4 38 3 88 - 5 62 Million/uL    Hemoglobin 13 5 12 0 - 17 0 g/dL    Hematocrit 41 7 36 5 - 49 3 %    MCV 95 82 - 98 fL    MCH 30 8 26 8 - 34 3 pg    MCHC 32 4 31 4 - 37 4 g/dL    RDW 13 2 11 6 - 15 1 %    Platelets 264 973 - 761 Thousands/uL    MPV 11 2 8 9 - 12 7 fL   Comprehensive metabolic panel   Result Value Ref Range    Sodium 139 136 - 145 mmol/L    Potassium 3 9 3 5 - 5 3 mmol/L    Chloride 104 100 - 108 mmol/L    CO2 27 21 - 32 mmol/L    Anion Gap 8 4 - 13 mmol/L    BUN 16 5 - 25 mg/dL    Creatinine 0 81 0 60 - 1 30 mg/dL    Glucose, Fasting 71 65 - 99 mg/dL    Calcium 8 4 8 3 - 10 1 mg/dL    AST 18 5 - 45 U/L    ALT 14 12 - 78 U/L    Alkaline Phosphatase 45 (L) 46 - 116 U/L    Total Protein 6 5 6 4 - 8 2 g/dL    Albumin 3 6 3 5 - 5 0 g/dL    Total Bilirubin 0 46 0 20 - 1 00 mg/dL    eGFR 97 ml/min/1 73sq m   TSH, 3rd generation   Result Value Ref Range    TSH 3RD GENERATON 3 790 (H) 0 358 - 3 740 uIU/mL   Lipid Panel with Direct LDL reflex   Result Value Ref Range    Cholesterol 149 50 - 200 mg/dL    Triglycerides 77 <=150 mg/dL    HDL, Direct 37 (L) 40 - 60 mg/dL    LDL Calculated 97 0 - 100 mg/dL   PSA   Result Value Ref Range    PSA 1 5 0 0 - 4 0 ng/mL       Orders Placed This Encounter   Procedures    MRI lumbar spine wo contrast    MRI cervical spine wo contrast    CT chest w contrast    CBC    Comprehensive metabolic panel    TSH, 3rd generation    Lipid Panel with Direct LDL reflex       ALLERGIES:  Allergies   Allergen Reactions    Gluten Meal Other (See Comments)       Current Medications     Current Outpatient Prescriptions   Medication Sig Dispense Refill    aspirin 81 MG tablet Take 81 mg by mouth daily   calcium-vitamin D (OSCAL 500 + D) 500 mg-200 units per tablet Take 1 tablet by mouth daily   ezetimibe (ZETIA) 10 mg tablet TAKE ONE TABLET BY MOUTH EVERY DAY 30 tablet 1    gabapentin (NEURONTIN) 300 mg capsule Take 1 capsule (300 mg total) by mouth 2 (two) times a day 60 capsule 5    levETIRAcetam (KEPPRA) 500 mg tablet Take 2 tablets (1,000 mg total) by mouth every 12 (twelve) hours 360 tablet 3    lisinopril (ZESTRIL) 5 mg tablet TAKE ONE TABLET BY MOUTH EVERY DAY 90 tablet 1    meloxicam (MOBIC) 15 mg tablet Take 15 mg by mouth daily  No current facility-administered medications for this visit            Health Maintenance     Health Maintenance   Topic Date Due    HIV SCREENING  1955    Hepatitis C Screening  1955    SLP PLAN OF CARE  1955    PNEUMOCOCCAL POLYSACCHARIDE VACCINE AGE 2-64 HIGH RISK  07/22/1959    Depression Screening PHQ-9  07/22/1969    DTaP,Tdap,and Td Vaccines (1 - Tdap) 07/22/1978    INFLUENZA VACCINE  09/01/2017    COLONOSCOPY  10/17/2019     Immunization History   Administered Date(s) Administered    Influenza TIV (IM) 10/09/2014, 09/16/2015       Demetria Galdamez MD

## 2018-04-17 DIAGNOSIS — M50.30 DDD (DEGENERATIVE DISC DISEASE), CERVICAL: ICD-10-CM

## 2018-04-17 RX ORDER — GABAPENTIN 300 MG/1
CAPSULE ORAL
Qty: 120 CAPSULE | Refills: 3 | Status: SHIPPED | OUTPATIENT
Start: 2018-04-17 | End: 2018-10-22 | Stop reason: SDUPTHER

## 2018-04-17 NOTE — TELEPHONE ENCOUNTER
Randy Baer,    Please contact patient or his father  Prescription for gabapentin was written as 300 mg t i d   I spoke with [facility] pharmacy, nobody else have prescribed gabapentin at a different dose  I have never increased patient's dose and not quite sure why they decided to double it?   Please clarify further   thank you

## 2018-04-17 NOTE — TELEPHONE ENCOUNTER
Spoke with pts father, he stated that "somewhere along the line" he thought the dose was changed to 2 caps in the AM and 2 caps in the Pm  Will be out of pill soon

## 2018-04-17 NOTE — TELEPHONE ENCOUNTER
Will Rx as 1 cap in am , 1 cap in mid day and 2 caps at [facility]  I would like patient to avoid taking 2 capsules together in the morning due to possible side effect of excessive sedation  Thanks

## 2018-04-26 ENCOUNTER — APPOINTMENT (OUTPATIENT)
Dept: LAB | Facility: CLINIC | Age: 63
End: 2018-04-26
Payer: COMMERCIAL

## 2018-04-26 DIAGNOSIS — I10 ESSENTIAL HYPERTENSION: ICD-10-CM

## 2018-04-26 DIAGNOSIS — E78.5 HYPERLIPIDEMIA, UNSPECIFIED HYPERLIPIDEMIA TYPE: ICD-10-CM

## 2018-04-26 LAB
ALBUMIN SERPL BCP-MCNC: 4 G/DL (ref 3.5–5)
ALP SERPL-CCNC: 56 U/L (ref 46–116)
ALT SERPL W P-5'-P-CCNC: 17 U/L (ref 12–78)
ANION GAP SERPL CALCULATED.3IONS-SCNC: 8 MMOL/L (ref 4–13)
AST SERPL W P-5'-P-CCNC: 16 U/L (ref 5–45)
BILIRUB SERPL-MCNC: 0.57 MG/DL (ref 0.2–1)
BUN SERPL-MCNC: 21 MG/DL (ref 5–25)
CALCIUM SERPL-MCNC: 8.8 MG/DL (ref 8.3–10.1)
CHLORIDE SERPL-SCNC: 103 MMOL/L (ref 100–108)
CHOLEST SERPL-MCNC: 178 MG/DL (ref 50–200)
CO2 SERPL-SCNC: 26 MMOL/L (ref 21–32)
CREAT SERPL-MCNC: 0.82 MG/DL (ref 0.6–1.3)
ERYTHROCYTE [DISTWIDTH] IN BLOOD BY AUTOMATED COUNT: 13.6 % (ref 11.6–15.1)
GFR SERPL CREATININE-BSD FRML MDRD: 96 ML/MIN/1.73SQ M
GLUCOSE P FAST SERPL-MCNC: 92 MG/DL (ref 65–99)
HCT VFR BLD AUTO: 40.8 % (ref 36.5–49.3)
HDLC SERPL-MCNC: 40 MG/DL (ref 40–60)
HGB BLD-MCNC: 12.9 G/DL (ref 12–17)
LDLC SERPL CALC-MCNC: 121 MG/DL (ref 0–100)
MCH RBC QN AUTO: 29.1 PG (ref 26.8–34.3)
MCHC RBC AUTO-ENTMCNC: 31.6 G/DL (ref 31.4–37.4)
MCV RBC AUTO: 92 FL (ref 82–98)
PLATELET # BLD AUTO: 254 THOUSANDS/UL (ref 149–390)
PMV BLD AUTO: 10.6 FL (ref 8.9–12.7)
POTASSIUM SERPL-SCNC: 4.4 MMOL/L (ref 3.5–5.3)
PROT SERPL-MCNC: 7.2 G/DL (ref 6.4–8.2)
RBC # BLD AUTO: 4.44 MILLION/UL (ref 3.88–5.62)
SODIUM SERPL-SCNC: 137 MMOL/L (ref 136–145)
TRIGL SERPL-MCNC: 83 MG/DL
TSH SERPL DL<=0.05 MIU/L-ACNC: 2.78 UIU/ML (ref 0.36–3.74)
WBC # BLD AUTO: 8.28 THOUSAND/UL (ref 4.31–10.16)

## 2018-04-26 PROCEDURE — 85027 COMPLETE CBC AUTOMATED: CPT

## 2018-04-26 PROCEDURE — 84443 ASSAY THYROID STIM HORMONE: CPT

## 2018-04-26 PROCEDURE — 80053 COMPREHEN METABOLIC PANEL: CPT

## 2018-04-26 PROCEDURE — 80061 LIPID PANEL: CPT

## 2018-04-26 PROCEDURE — 36415 COLL VENOUS BLD VENIPUNCTURE: CPT

## 2018-04-27 ENCOUNTER — HOSPITAL ENCOUNTER (OUTPATIENT)
Dept: RADIOLOGY | Facility: HOSPITAL | Age: 63
Discharge: HOME | End: 2018-04-27
Attending: PHYSICAL MEDICINE & REHABILITATION
Payer: COMMERCIAL

## 2018-04-27 DIAGNOSIS — M51.37 DDD (DEGENERATIVE DISC DISEASE), LUMBOSACRAL: ICD-10-CM

## 2018-04-27 DIAGNOSIS — I71.2 ANEURYSM OF ASCENDING AORTA (HCC): ICD-10-CM

## 2018-04-27 DIAGNOSIS — M50.30 DDD (DEGENERATIVE DISC DISEASE), CERVICAL: ICD-10-CM

## 2018-04-27 PROCEDURE — 72141 MRI NECK SPINE W/O DYE: CPT

## 2018-04-27 PROCEDURE — 72148 MRI LUMBAR SPINE W/O DYE: CPT

## 2018-04-27 PROCEDURE — 71260 CT THORAX DX C+: CPT

## 2018-04-27 RX ADMIN — IOHEXOL 85 ML: 350 INJECTION, SOLUTION INTRAVENOUS at 08:16

## 2018-04-30 ENCOUNTER — TELEPHONE (OUTPATIENT)
Dept: FAMILY MEDICINE CLINIC | Facility: CLINIC | Age: 63
End: 2018-04-30

## 2018-04-30 DIAGNOSIS — M51.37 DDD (DEGENERATIVE DISC DISEASE), LUMBOSACRAL: Primary | ICD-10-CM

## 2018-04-30 DIAGNOSIS — M50.30 DDD (DEGENERATIVE DISC DISEASE), CERVICAL: ICD-10-CM

## 2018-04-30 NOTE — TELEPHONE ENCOUNTER
Please contact patient or his father  I received results of his blood work and MRI of lumbar sacral spine and cervical spine  Blood work was normal  MRI of lumbar sacral spine does not reveal any worrisome disc herniations or stenosis  MRI of cervical spine reveals multilevel arthritis and significant stenosis that could be attributing to patient's right-sided weakness  At this time I recommend patient to be scheduled for evaluation by [facility] and [address] [place] [address] in [place]  I also advised to proceed with EMG/nerve conduction study of upper extremities and lower extremities for further evaluation, I will place orders      Thank you

## 2018-05-02 ENCOUNTER — TELEPHONE (OUTPATIENT)
Dept: FAMILY MEDICINE CLINIC | Facility: CLINIC | Age: 63
End: 2018-05-02

## 2018-05-02 NOTE — TELEPHONE ENCOUNTER
----- Message from Jamilah Barrios MD sent at 5/2/2018  1:51 PM EDT -----  Please contact patient    CT chest revealed stable aortic aneurysm, I did forward results to his cardiologist   Thank you

## 2018-06-13 DIAGNOSIS — E78.5 HYPERLIPIDEMIA, UNSPECIFIED HYPERLIPIDEMIA TYPE: ICD-10-CM

## 2018-06-13 RX ORDER — EZETIMIBE 10 MG/1
TABLET ORAL
Qty: 30 TABLET | Refills: 5 | Status: SHIPPED | OUTPATIENT
Start: 2018-06-13 | End: 2018-12-20 | Stop reason: SDUPTHER

## 2018-07-10 ENCOUNTER — HOSPITAL ENCOUNTER (OUTPATIENT)
Dept: NEUROLOGY | Facility: AMBULATORY SURGERY CENTER | Age: 63
Discharge: HOME | End: 2018-07-10
Payer: COMMERCIAL

## 2018-07-10 DIAGNOSIS — M50.30 DDD (DEGENERATIVE DISC DISEASE), CERVICAL: ICD-10-CM

## 2018-07-10 PROCEDURE — 95886 MUSC TEST DONE W/N TEST COMP: CPT | Performed by: PSYCHIATRY & NEUROLOGY

## 2018-07-10 PROCEDURE — 95912 NRV CNDJ TEST 11-12 STUDIES: CPT | Performed by: PSYCHIATRY & NEUROLOGY

## 2018-07-12 ENCOUNTER — HOSPITAL ENCOUNTER (OUTPATIENT)
Dept: NEUROLOGY | Facility: AMBULATORY SURGERY CENTER | Age: 63
Discharge: HOME | End: 2018-07-12
Payer: COMMERCIAL

## 2018-07-12 DIAGNOSIS — M51.37 DDD (DEGENERATIVE DISC DISEASE), LUMBOSACRAL: ICD-10-CM

## 2018-07-12 DIAGNOSIS — M50.30 DDD (DEGENERATIVE DISC DISEASE), CERVICAL: ICD-10-CM

## 2018-07-12 PROCEDURE — 95886 MUSC TEST DONE W/N TEST COMP: CPT | Performed by: PSYCHIATRY & NEUROLOGY

## 2018-07-12 PROCEDURE — 95910 NRV CNDJ TEST 7-8 STUDIES: CPT | Performed by: PSYCHIATRY & NEUROLOGY

## 2018-07-13 ENCOUNTER — DOCUMENTATION (OUTPATIENT)
Dept: FAMILY MEDICINE CLINIC | Facility: CLINIC | Age: 63
End: 2018-07-13

## 2018-07-13 NOTE — PROGRESS NOTES
I reviewed results of EMG of upper and lower extremities  I will be discussing results of those studies with patient at his forthcoming appointment on July 19th  Reportedly, patient had poor tolerance with studies  Neurologist describes left ulnar neuropathy and significant moderate peripheral polyneuropathy of lower extremities    No evidence of cervical or lumbar radiculopathy

## 2018-07-19 ENCOUNTER — OFFICE VISIT (OUTPATIENT)
Dept: FAMILY MEDICINE CLINIC | Facility: CLINIC | Age: 63
End: 2018-07-19
Payer: COMMERCIAL

## 2018-07-19 VITALS
HEART RATE: 60 BPM | DIASTOLIC BLOOD PRESSURE: 84 MMHG | HEIGHT: 74 IN | RESPIRATION RATE: 16 BRPM | BODY MASS INDEX: 25.33 KG/M2 | TEMPERATURE: 97.1 F | WEIGHT: 197.4 LBS | SYSTOLIC BLOOD PRESSURE: 122 MMHG

## 2018-07-19 DIAGNOSIS — M51.37 DDD (DEGENERATIVE DISC DISEASE), LUMBOSACRAL: ICD-10-CM

## 2018-07-19 DIAGNOSIS — G62.9 POLYNEUROPATHY: ICD-10-CM

## 2018-07-19 DIAGNOSIS — M25.50 ARTHRALGIA, UNSPECIFIED JOINT: ICD-10-CM

## 2018-07-19 DIAGNOSIS — Z12.5 ENCOUNTER FOR PROSTATE CANCER SCREENING: ICD-10-CM

## 2018-07-19 DIAGNOSIS — G89.4 CHRONIC PAIN DISORDER: ICD-10-CM

## 2018-07-19 DIAGNOSIS — M48.02 FORAMINAL STENOSIS OF CERVICAL REGION: ICD-10-CM

## 2018-07-19 DIAGNOSIS — E78.5 HYPERLIPIDEMIA, UNSPECIFIED HYPERLIPIDEMIA TYPE: ICD-10-CM

## 2018-07-19 DIAGNOSIS — I71.2 ANEURYSM OF ASCENDING AORTA (HCC): ICD-10-CM

## 2018-07-19 DIAGNOSIS — Z00.00 ENCOUNTER FOR MEDICARE ANNUAL WELLNESS EXAM: ICD-10-CM

## 2018-07-19 DIAGNOSIS — Z12.11 SCREENING FOR COLON CANCER: ICD-10-CM

## 2018-07-19 DIAGNOSIS — I10 ESSENTIAL HYPERTENSION: ICD-10-CM

## 2018-07-19 DIAGNOSIS — M50.30 DDD (DEGENERATIVE DISC DISEASE), CERVICAL: Primary | ICD-10-CM

## 2018-07-19 PROCEDURE — 99214 OFFICE O/P EST MOD 30 MIN: CPT | Performed by: FAMILY MEDICINE

## 2018-07-19 PROCEDURE — 3008F BODY MASS INDEX DOCD: CPT | Performed by: FAMILY MEDICINE

## 2018-07-19 PROCEDURE — G0439 PPPS, SUBSEQ VISIT: HCPCS | Performed by: FAMILY MEDICINE

## 2018-07-19 PROCEDURE — 3725F SCREEN DEPRESSION PERFORMED: CPT | Performed by: FAMILY MEDICINE

## 2018-07-19 NOTE — PROGRESS NOTES
FAMILY PRACTICE OFFICE VISIT       NAME: Thelma Gamboa  AGE: 66 y o  SEX: male       : 1955        MRN: [id number]      Assessment and Plan     Problem List Items Addressed This Visit     DDD (degenerative disc disease), cervical - Primary     MRI reveals bilateral severe foraminal stenosis  Referral to Pain Management for injections  EMG of upper extremity reveals poly neuropathy, no evidence of cervical radiculopathy and evidence of left ulnar neuropathy          Relevant Orders    Ambulatory referral to Pain Management    Chronic pain disorder    Relevant Orders    Ambulatory referral to Pain Management    DDD (degenerative disc disease), lumbosacral    Relevant Orders    Ambulatory referral to Pain Management    Hyperlipidemia     Zetia 10 mg daily         Relevant Orders    Comprehensive metabolic panel    Lipid panel    Hypertension     Lisinopril 5 mg daily         Relevant Orders    Comprehensive metabolic panel    Lipid panel    Aneurysm of ascending aorta [facility])     CT chest 2015, 2016, 2018  Stable 41 mm ascending aortic aneurysm             Polyneuropathy      Other Visit Diagnoses     Foraminal stenosis of cervical region        Relevant Orders    Ambulatory referral to Pain Management    Arthralgia, unspecified joint        Relevant Orders    Ambulatory referral to Rheumatology    Encounter for Medicare annual wellness exam        Encounter for prostate cancer screening        Relevant Orders    PSA, Total Screen    Screening for colon cancer        Relevant Orders    Cologuard       Patient presents for follow-up of chronic medical conditions  Chronic pain  I reviewed results of MRI of lumbar sacral and cervical spine as well as EMG of upper lower extremities with patient today in length  It reveals multilevel degenerative spine disease and severe foraminal stenosis of cervical spine      No evidence of cervical or lumbar radiculopath confirms polyneuropathy   Patient would like to apply for marijuana medical card  I provided him with a list of physicians who participated medical marijuana treatment  I am referring patient to [facility] for consideration of injections for chronic neck and back pain  Patient will proceed with evaluation by Rheumatology to rule out possibility of connective tissue disease  He is extensive workup back in 2016 was negative  Patient does have family history of fibromyalgia/mother  He will remain on Mobic and gabapentin  Patient uses lisinopril for hypertension and Zetia for hyperlipidemia  He follows up with [facility] for surveillance of seizure disorder and is treated with [employer]:  He refuses colonoscopy but is willing to proceed with Cologuard  Patient is up-to-date with PSA screenings  Stable ascending thoracic aneurysm, results of recent CT discussed  Blood work and follow-up in 3 months    There are no Patient Instructions on file for this visit  Chief Complaint     Chief Complaint   Patient presents with   [facility] Wellness Visit     Patient is here for a Medicare Wellness Exam     Follow-up     Patient is also here for a followup  Patient would like to discuss how to go about receiving medical marijuana   Arthritis     Patient c/o an ongoing issue with multiple joint pain and swellling  History of Present Illness     Follow-up chronic medical conditions  Patient is complaining of chronic persistent neck pain, low back pain, and multiple arthralgias  Recent workup included MRI of lumbar sacral spine and MRI of cervical spine  Patient also had EMG of upper and lower extremities  No evidence of cervical of lumbar radiculopathy  Both tests indicate significant polyneuropathy  There is also evidence of carpal tunnel syndrome on the left  Patient is complaining of persistent neck pain worse on the right  MRI reveals multilevel severe foraminal stenosis    Patient underwent extensive workup back in August of 2016 to rule out possibility of connective tissue disease  Testing including sed rate, C-reactive protein, WENCESLAO, rheumatoid factor, HLA B27, Sjogren's antibodies and was all negative  Patient was subsequently referred to [facility] but did not proceed with consult  Patient remains on regimen of gabapentin and meloxicam   He uses lisinopril 5 mg once a day for hypertension and Zetia 10 mg for hyperlipidemia  His routine blood work including CBC, CMP, TSH and lipid panel was performed in late April in was unremarkable  Patient denies chest pain, palpitations, shortness of breath or dizziness    He is under care of [facility] due to history of seizure disorder, on [facility]          Review of Systems   Review of Systems    Active Problem List     Patient Active Problem List   Diagnosis    DDD (degenerative disc disease), cervical    Complex partial seizure evolving to generalized seizure ([address] )    Chronic pain disorder    DDD (degenerative disc disease), lumbosacral    Hyperlipidemia    Hypertension    Aneurysm of ascending aorta (HCC)    Numbness and tingling in both hands    Polyneuropathy       Past Medical History:  Past Medical History:   Diagnosis Date    Anxiety disorder     Arthritis     Last assessed: 11/17/16    Asthma     Depression     Hypertension     Seizures ([address] )     Stroke syndrome ([address] )        Past Surgical History:  Past Surgical History:   Procedure Laterality Date    ANKLE SURGERY      COLONOSCOPY  2009    Due 2014    HERNIA REPAIR      TOTAL HIP ARTHROPLASTY Left 02/2014       Family History:  Family History   Problem Relation Age of Onset    Anxiety disorder Mother         Symptom/disorder    Hypertension Mother         Benign Essential    Mitral valve prolapse Mother         Echo/Systolic    PABLO disease Mother     Fibromyalgia Mother     Hyperlipidemia Mother     Diabetes Father         Mellitus    Cancer Brother     Stroke Family         CVA    Cancer Family     Sudden death Family         Instantaneous Cardiac Death    Other Family         Connective Tissue Defect       Social History:  Social History     Social History    Marital status:      Spouse name: N/A    Number of children: N/A    Years of education: Bachelor's Degree     Occupational History    Not on file       Social History Main Topics    Smoking status: Never Smoker    Smokeless tobacco: Never Used      Comment: Per [employer]: Former smoker    Alcohol use Yes      Comment: Occasionally    Drug use: Yes     Types: Marijuana    Sexual activity: Not on file     Other Topics Concern    Not on file     Social History Narrative    Daily coffee consumption: 7 cups/day    Per [employer]: Currently        Objective     Vitals:    07/19/18 0759   BP: 122/84   Pulse: 60   Resp: 16   Temp: (!) 97 1 °F (36 2 °C)     Wt Readings from Last 3 Encounters:   07/19/18 89 5 kg (197 lb 6 4 oz)   04/16/18 91 7 kg (202 lb 3 2 oz)   04/16/18 91 5 kg (201 lb 12 8 oz)       Physical Exam    Pertinent Laboratory/Diagnostic Studies:  Lab Results   Component Value Date    GLUCOSE 79 08/30/2016    BUN 21 04/26/2018    CREATININE 0 82 04/26/2018    CALCIUM 8 8 04/26/2018     04/26/2018    K 4 4 04/26/2018    CO2 26 04/26/2018     04/26/2018     Lab Results   Component Value Date    ALT 17 04/26/2018    AST 16 04/26/2018    ALKPHOS 56 04/26/2018    BILITOT 0 57 04/26/2018       Lab Results   Component Value Date    WBC 8 28 04/26/2018    HGB 12 9 04/26/2018    HCT 40 8 04/26/2018    MCV 92 04/26/2018     04/26/2018       No results found for: TSH    Lab Results   Component Value Date    CHOL 178 04/26/2018     Lab Results   Component Value Date    TRIG 83 04/26/2018     Lab Results   Component Value Date    HDL 40 04/26/2018     Lab Results   Component Value Date    LDLCALC 121 (H) 04/26/2018     Lab Results   Component Value Date    HGBA1C 4 9 03/18/2014       Results for orders placed or performed in visit on 04/26/18   CBC   Result Value Ref Range    WBC 8 28 4 31 - 10 16 Thousand/uL    RBC 4 44 3 88 - 5 62 Million/uL    Hemoglobin 12 9 12 0 - 17 0 g/dL    Hematocrit 40 8 36 5 - 49 3 %    MCV 92 82 - 98 fL    MCH 29 1 26 8 - 34 3 pg    MCHC 31 6 31 4 - 37 4 g/dL    RDW 13 6 11 6 - 15 1 %    Platelets 564 616 - 459 Thousands/uL    MPV 10 6 8 9 - 12 7 fL   Comprehensive metabolic panel   Result Value Ref Range    Sodium 137 136 - 145 mmol/L    Potassium 4 4 3 5 - 5 3 mmol/L    Chloride 103 100 - 108 mmol/L    CO2 26 21 - 32 mmol/L    Anion Gap 8 4 - 13 mmol/L    BUN 21 5 - 25 mg/dL    Creatinine 0 82 0 60 - 1 30 mg/dL    Glucose, Fasting 92 65 - 99 mg/dL    Calcium 8 8 8 3 - 10 1 mg/dL    AST 16 5 - 45 U/L    ALT 17 12 - 78 U/L    Alkaline Phosphatase 56 46 - 116 U/L    Total Protein 7 2 6 4 - 8 2 g/dL    Albumin 4 0 3 5 - 5 0 g/dL    Total Bilirubin 0 57 0 20 - 1 00 mg/dL    eGFR 96 ml/min/1 73sq m   TSH, 3rd generation   Result Value Ref Range    TSH 3RD GENERATON 2 780 0 358 - 3 740 uIU/mL   Lipid Panel with Direct LDL reflex   Result Value Ref Range    Cholesterol 178 50 - 200 mg/dL    Triglycerides 83 <=150 mg/dL    HDL, Direct 40 40 - 60 mg/dL    LDL Calculated 121 (H) 0 - 100 mg/dL       Orders Placed This Encounter   Procedures    Cologuard    Comprehensive metabolic panel    Lipid panel    PSA, Total Screen    Ambulatory referral to Pain Management    Ambulatory referral to Rheumatology       ALLERGIES:  Allergies   Allergen Reactions    Gluten Meal Other (See Comments)       Current Medications     Current Outpatient Prescriptions   Medication Sig Dispense Refill    aspirin 81 MG tablet Take 81 mg by mouth daily   calcium-vitamin D (OSCAL 500 + D) 500 mg-200 units per tablet Take 1 tablet by mouth daily        ezetimibe (ZETIA) 10 mg tablet TAKE ONE TABLET BY MOUTH EVERY DAY 30 tablet 5    gabapentin (NEURONTIN) 300 mg capsule Take 1 capsule in the morning, 1 capsule in the middle of the day, and 2 capsules at bedtime  120 capsule 3    levETIRAcetam (KEPPRA) 500 mg tablet Take 2 tablets (1,000 mg total) by mouth every 12 (twelve) hours 360 tablet 3    lisinopril (ZESTRIL) 5 mg tablet TAKE ONE TABLET BY MOUTH EVERY DAY 90 tablet 1    meloxicam (MOBIC) 15 mg tablet Take 15 mg by mouth daily  No current facility-administered medications for this visit            Health Maintenance     Health Maintenance   Topic Date Due    HIV SCREENING  1955    Hepatitis C Screening  1955    [facility] PLAN OF CARE  1955    CRC Screening: Colonoscopy  1955    PNEUMOCOCCAL POLYSACCHARIDE VACCINE AGE 2-64 HIGH RISK  07/22/1959    DTaP,Tdap,and Td Vaccines (1 - Tdap) 07/22/1978    INFLUENZA VACCINE  09/01/2018    Depression Screening PHQ-9  07/19/2019     Immunization History   Administered Date(s) Administered    Influenza TIV (IM) 10/09/2014, 09/16/2015       Marilyn Mccarthy MD

## 2018-07-22 ENCOUNTER — TELEPHONE (OUTPATIENT)
Dept: FAMILY MEDICINE CLINIC | Facility: CLINIC | Age: 63
End: 2018-07-22

## 2018-07-22 NOTE — ASSESSMENT & PLAN NOTE
MRI reveals bilateral severe foraminal stenosis  Referral to Pain Management for injections    EMG of upper extremity reveals poly neuropathy, no evidence of cervical radiculopathy and evidence of left ulnar neuropathy

## 2018-07-22 NOTE — PROGRESS NOTES
Medicare Annual Wellness Visit  NAME: Andrei Nicolas SEX: male : 1955  HPI:  Andrei Nicolas is a 66 y o  male who presents to clinic today for Medicare wellness exam    Last Medicare wellness visit information was reviewed, patient was interviewed , no changes since last AWV: yes    Last Medicare wellness visit information was reviewed, patient interviewed and updates made to the record today: yes    PATIENT CARE TEAM:  Patient Care Team:  Jamilah Barrios MD as PCP - General  Ezekiel Spurling, PA-C Kandi Frees, MD Berna Low, MD Wilma Mckee, MD Jc Beebe DO    PAST MEDICAL HISTORY:  Past Medical History:   Diagnosis Date    Anxiety disorder     Arthritis     Last assessed: 16    Asthma     Depression     Hypertension     Seizures ([address] )     Stroke syndrome ([address] )        PAST SURGICAL HISTORY:  Past Surgical History:   Procedure Laterality Date    ANKLE SURGERY      COLONOSCOPY      Due     HERNIA REPAIR      TOTAL HIP ARTHROPLASTY Left 2014       PROBLEM LIST:  Patient Active Problem List   Diagnosis    DDD (degenerative disc disease), cervical    Complex partial seizure evolving to generalized seizure ([address] )    Chronic pain disorder    DDD (degenerative disc disease), lumbosacral    Hyperlipidemia    Hypertension    Aneurysm of ascending aorta (HCC)    Numbness and tingling in both hands    Polyneuropathy       FAMILY HISTORY:  Family History   Problem Relation Age of Onset    Anxiety disorder Mother         Symptom/disorder    Hypertension Mother         Benign Essential    Mitral valve prolapse Mother         Echo/Systolic    PABLO disease Mother     Fibromyalgia Mother     Hyperlipidemia Mother     Diabetes Father         Mellitus    Cancer Brother     Stroke Family         CVA    Cancer Family     Sudden death Family         Instantaneous Cardiac Death    Other Family         Connective Tissue Defect       SOCIAL HISTORY:  Tamie Petersen  reports that he has never smoked  He has never used smokeless tobacco  He reports that he drinks alcohol  He reports that he uses drugs, including Marijuana  ALLERGIES:  Allergies   Allergen Reactions    Gluten Meal Other (See Comments)       CURRENT MEDICATIONS:  Current Outpatient Prescriptions   Medication Sig Dispense Refill    aspirin 81 MG tablet Take 81 mg by mouth daily   calcium-vitamin D (OSCAL 500 + D) 500 mg-200 units per tablet Take 1 tablet by mouth daily   ezetimibe (ZETIA) 10 mg tablet TAKE ONE TABLET BY MOUTH EVERY DAY 30 tablet 5    gabapentin (NEURONTIN) 300 mg capsule Take 1 capsule in the morning, 1 capsule in the middle of the day, and 2 capsules at bedtime  120 capsule 3    levETIRAcetam (KEPPRA) 500 mg tablet Take 2 tablets (1,000 mg total) by mouth every 12 (twelve) hours 360 tablet 3    lisinopril (ZESTRIL) 5 mg tablet TAKE ONE TABLET BY MOUTH EVERY DAY 90 tablet 1    meloxicam (MOBIC) 15 mg tablet Take 15 mg by mouth daily  No current facility-administered medications for this visit          IMMUNIZATIONS:  Immunization History   Administered Date(s) Administered    Influenza TIV (IM) 10/09/2014, 09/16/2015       HEALTH MAINTENANCE:  Health Maintenance   Topic Date Due    HIV SCREENING  1955    Hepatitis C Screening  1955    [facility] PLAN OF CARE  1955    CRC Screening: Colonoscopy  1955    PNEUMOCOCCAL POLYSACCHARIDE VACCINE AGE 2-64 HIGH RISK  07/22/1959    DTaP,Tdap,and Td Vaccines (1 - Tdap) 07/22/1978    INFLUENZA VACCINE  09/01/2018    Depression Screening PHQ-9  07/19/2019       PATIENT HEALTH RISK ASSESSMENT (HRA):  AWV Clinical     ISAR:   Previous hospitalizations?:  No       Once in a Lifetime Medicare Screening:   EKG performed?:  No    AAA screening performed? (if performed, please add date to Health Maintenance):  No       Medicare Screening Tests and Risk Assessment:   AAA Risk Assessment     Tobacco use (males only):  No   Age over 72 (males only):  No Family history of AAA:  No   Osteoporosis Risk Assessment    :  Yes    Age over 48:  Yes    HIV Risk Assessment        Drug and Alcohol Use:   Tobacco use    Cigarettes:  former smoker    Quit date:  1/1/1998   Smokeless:  never used smokeless tobacco    Tobacco use duration    Tobacco Cessation Readiness    Alcohol use    Alcohol use:  never    Alcohol Treatment Readiness   Illicit Drug Use    Drug use:  current occasional user Frequency:  weekly   Drug type:  marijuana use   Drug use comments:  Pt reports Marijuana use 3 X's / week       Diet & Exercise:   Diet   What is your diet?:  Regular   How many servings a day of the following:   Fruits and Vegetables:  1-2 Meat:  1-2   Whole Grains:  1 Simple Carbs:  1   Dairy:  1 Soda:  0   Coffee:  1 Tea:  0   Exercise    Do you currently exercise?:  yes    Frequency:  daily    Minutes per day:  30    Type of exercise:  walking   strength training, stretching        Cognitive Impairment Screening:   Depression screening preformed:  Yes     PHQ-9 Depression scale score:  12   Cognitive Impairment Screening        Functional Ability/Level of Safety:   Hearing    Hearing difficulties:  Yes Bilateral:  slightly decreased   Left:  slightly decreased Right:  slightly decreased   Hearing aid:  No    Hearing Impairment Assessment    Current Activities    Status:  limited ADL's, limited driving, limited social activities   Help needed with the folllowing:    Using the phone:  No Transportation:  No   Shopping:  No Preparing Meals:  No   Doing Housework:  No Doing Laundry:  No   Managing Medications:  No Managing Money:  No   ADL    Fall Risk   Have you fallen in the last 12 months?:  Yes Are you unsteady on your feet?:  Yes   How many times?:  2    Injury History       Home Safety:   Home Safety Risk Factors   Unfamilar with surroundings:  No Uneven floors:  No   Stairs or handrail saftey risk: No Loose rugs:  No   Household clutter:  No Poor household lighting:  No   No grab bars in bathroom:  No Further evaluation needed:  No       Advanced Directives:   Advanced Directives    Living Will:  No Durable POA for healthcare:  No   Advanced directive:  No    Patient's End of Life Decisions        Urinary Incontinence:   Do you have urinary incontinence?:  No        Glaucoma:             Provider Screening     Preventative Screening/Counseling:   Cardiovascular Screening/Counseling:   (Labs Q5 years, EKG optional one-time)   General:  Risks and Benefits Discussed, Screening Current           Diabetes Screening/Counseling:   (2 tests/year if Pre-Diabetes or 1 test/year if no Diabetes)   General:  Risks and Benefits Discussed, Screening Current           Colorectal Cancer Screening/Counseling:   (FOBT Q1 yr; Flex Sig Q4 yrs or Q10 yrs after Screening Colonoscopy; Screening Colonoscpy Q2 yrs High Risk or Q10 yrs Low Risk; Barium Enema Q2 yrs High Risk or Q4 yrs Low Risk)   General:  Risks and Benefits Discussed           Prostate Cancer Screening/Counseling:   (Annual)    General:  Risks and Benefits Discussed, Screening Current          Breast Cancer Screening/Counseling:   (Baseline Age 28 - 43; Annual Age 36+)         Cervical Cancer Screening/Counseling:   (Annual for High Risk or Childbearing Age with Abnormal Pap in Last 3 yrs; Every 2 all others)         Osteoporosis Screening/Counseling:   (Every 2 Yrs if at risk or more if medically necessary)         AAA Screening/Counseling:   (Once per Lifetime with risk factors)    Family History of AAA:  No Age over 72 (males only):  No Tobacco use (males only):  No   General:  Screening Current           Glaucoma Screening/Counseling:   (Annual)         HIV Screening/Counseling:   (Voluntary;  Once annually for high risk OR 3 times for Pregnancy at diagnosis of IUP; 3rd trimester; and at Labor         Hepatitis C Screening:             Immunizations:   Influenza (annual): Influenza Recommended Annually       Other Preventative Couseling (Non-Medicare Wellness Visit Required):   fall prevention education provided, Increased physical activity counseling given       Referrals (Non-Medicare Wellness Visit Required):   referred to PT, consult pain management, Rheumatology       Medical Equipment/Suppliers:             VITALS:  /84   Pulse 60   Temp (!) 97 1 °F (36 2 °C) (Tympanic)   Resp 16   Ht 6' 2" (1 88 m)   Wt 89 5 kg (197 lb 6 4 oz)   BMI 25 34 kg/m²     MEDICARE SCREENING LABS:  Fasting glucose:   Lab Results   Component Value Date/Time    GLUCOSE 79 08/30/2016 07:43 AM    GLUCOSE 83 12/18/2015 10:01 AM    GLUF 92 04/26/2018 07:15 AM    GLUF 86 03/18/2014 09:28 AM   , Lipid panel:   Lab Results   Component Value Date/Time    CHOL 178 04/26/2018 07:15 AM    CHOL 200 12/18/2015 10:01 AM    HDL 40 04/26/2018 07:15 AM    HDL 37 12/18/2015 10:01 AM    LDLCALC 121 (H) 04/26/2018 07:15 AM    LDLCALC 147 (H) 12/18/2015 10:01 AM    TRIG 83 04/26/2018 07:15 AM    TRIG 78 12/18/2015 10:01 AM       ASSESSMENT/PLAN:   1  DDD (degenerative disc disease), cervical    - Ambulatory referral to Pain Management; Future    2  DDD (degenerative disc disease), lumbosacral    - Ambulatory referral to Pain Management; Future    3  Chronic pain disorder    - Ambulatory referral to Pain Management; Future    4  Foraminal stenosis of cervical region    - Ambulatory referral to Pain Management; Future    5  Arthralgia, unspecified joint    - Ambulatory referral to Rheumatology; Future    6  Aneurysm of ascending aorta (HCC)    7  Polyneuropathy      8  Hyperlipidemia, unspecified hyperlipidemia type    - Comprehensive metabolic panel; Future  - Lipid panel; Future    9  Encounter for Medicare annual wellness exam    10  Essential hypertension    - Comprehensive metabolic panel; Future  - Lipid panel; Future    11   Encounter for prostate cancer screening    - PSA, Total Screen; Future    12  Screening for colon cancer    - Cologuard      MEDICARE WELLNESS COUNSELING/DISCUSSION:  Medicare wellness exam   Patient is up-to-date with PSA screening, cardiovascular screening and blood sugar monitoring  He refuses colonoscopy but is willing to proceed with Cologuard  CT abdomen pelvis in 2014:  Abdominal aorta is described as normal, it could service patient's screening at present time  He has been suffering from severe daily poorly articular arthralgias and chronic neck and back pain and is interesting in pursuing medical marijuana treatment      Thania Kenyon MD  7/22/2018

## 2018-07-22 NOTE — TELEPHONE ENCOUNTER
Please mail blood work slip to patient, please melo that it needs to be done in October prior to his next office visit        Please fax orders for Cologuard    Thank you

## 2018-09-14 DIAGNOSIS — I10 ESSENTIAL HYPERTENSION: ICD-10-CM

## 2018-09-15 RX ORDER — LISINOPRIL 5 MG/1
TABLET ORAL
Qty: 90 TABLET | Refills: 1 | Status: SHIPPED | OUTPATIENT
Start: 2018-09-15 | End: 2018-10-22 | Stop reason: SDUPTHER

## 2018-10-22 ENCOUNTER — OFFICE VISIT (OUTPATIENT)
Dept: FAMILY MEDICINE CLINIC | Facility: CLINIC | Age: 63
End: 2018-10-22
Payer: COMMERCIAL

## 2018-10-22 VITALS
TEMPERATURE: 96.9 F | HEART RATE: 68 BPM | WEIGHT: 199 LBS | BODY MASS INDEX: 25.54 KG/M2 | HEIGHT: 74 IN | DIASTOLIC BLOOD PRESSURE: 70 MMHG | SYSTOLIC BLOOD PRESSURE: 106 MMHG | RESPIRATION RATE: 14 BRPM

## 2018-10-22 DIAGNOSIS — M51.37 DDD (DEGENERATIVE DISC DISEASE), LUMBOSACRAL: ICD-10-CM

## 2018-10-22 DIAGNOSIS — E78.5 HYPERLIPIDEMIA, UNSPECIFIED HYPERLIPIDEMIA TYPE: ICD-10-CM

## 2018-10-22 DIAGNOSIS — I10 ESSENTIAL HYPERTENSION: Primary | ICD-10-CM

## 2018-10-22 DIAGNOSIS — I71.2 ANEURYSM OF ASCENDING AORTA (HCC): ICD-10-CM

## 2018-10-22 DIAGNOSIS — G89.4 CHRONIC PAIN DISORDER: ICD-10-CM

## 2018-10-22 DIAGNOSIS — Z12.11 SCREENING FOR COLON CANCER: ICD-10-CM

## 2018-10-22 DIAGNOSIS — M50.30 DDD (DEGENERATIVE DISC DISEASE), CERVICAL: ICD-10-CM

## 2018-10-22 DIAGNOSIS — Z28.21 REFUSED INFLUENZA VACCINE: ICD-10-CM

## 2018-10-22 PROCEDURE — 99213 OFFICE O/P EST LOW 20 MIN: CPT | Performed by: FAMILY MEDICINE

## 2018-10-22 RX ORDER — GABAPENTIN 300 MG/1
CAPSULE ORAL
Qty: 120 CAPSULE | Refills: 3 | Status: SHIPPED | OUTPATIENT
Start: 2018-10-22 | End: 2019-04-17 | Stop reason: SDUPTHER

## 2018-10-22 RX ORDER — LISINOPRIL 2.5 MG/1
2.5 TABLET ORAL DAILY
Qty: 90 TABLET | Refills: 2 | Status: SHIPPED | OUTPATIENT
Start: 2018-10-22 | End: 2019-11-14 | Stop reason: SDUPTHER

## 2018-10-22 NOTE — PATIENT INSTRUCTIONS
Will decrease dose of lisinopril from 5 mg once a day to 2 5 mg daily     please proceed with blood work, 12 hour fasting

## 2018-10-22 NOTE — PROGRESS NOTES
FAMILY PRACTICE OFFICE VISIT       NAME: Jackson Rodriguez  AGE: 66 y o  SEX: male       : 1955        MRN: [id number]        Assessment and Plan     Problem List Items Addressed This Visit     Chronic pain disorder     Patient is using medical marijuana with significant improvement of symptoms         DDD (degenerative disc disease), lumbosacral    Hyperlipidemia    Hypertension - Primary    Relevant Medications    lisinopril (ZESTRIL) 2 5 mg tablet    Aneurysm of ascending aorta (HCC)     CT chest  2018  Stable 41 mm ascending aortic aneurysm    No new findings  Other Visit Diagnoses     Refused influenza vaccine        Relevant Orders    influenza vaccine, [id number], quadrivalent, recombinant, PF, 0 5 mL, for patients 18 yr+ (FLUBLOK)    Screening for colon cancer        Relevant Orders    Cologuard       Patient presents for follow-up  He has been feeling well and offers no complaints  Will decrease dose of lisinopril from 5 mg once a day to 2 5 mg once a day due to lower blood pressure  Patient denies any symptoms of chest pain, palpitations, dyspnea dizziness or lightheadedness  He is compliant with daily medications for chronic medical conditions  Pending blood work including CMP, lipid panel and PSA  Orders for Cologuard as outlined above  Patient declines flu vaccine  Follow up pending labs and in 6 months  Patient Instructions   Will decrease dose of lisinopril from 5 mg once a day to 2 5 mg daily     please proceed with blood work, 12 hour fasting          Chief Complaint     Chief Complaint   Patient presents with    Follow-up     Patient is here for a follow-up  Patient would like a script for cologaurd  History of Present Illness     Patient presents for follow-up  He has been feeling well  He remains on medications for hypertension hyperlipidemia  No recent blood work    His study using medical marijuana with significant improvement of chronic myalgias and arthralgias  Patient still remains on meloxicam and gabapentin  He denies chest pain, palpitations, shortness of breath or dizziness  He remains on Keppra and follows up with [facility]  Review of Systems   Review of Systems   Constitutional: Negative  HENT: Negative  Respiratory: Negative  Cardiovascular: Negative  Gastrointestinal: Negative  Genitourinary: Negative  Musculoskeletal: Positive for arthralgias and back pain  Neurological: Negative  Psychiatric/Behavioral: Negative          Active Problem List     Patient Active Problem List   Diagnosis    DDD (degenerative disc disease), cervical    Complex partial seizure evolving to generalized seizure ([address] )    Chronic pain disorder    DDD (degenerative disc disease), lumbosacral    Hyperlipidemia    Hypertension    Aneurysm of ascending aorta (HCC)    Numbness and tingling in both hands    Polyneuropathy       Past Medical History:  Past Medical History:   Diagnosis Date    Anxiety disorder     Arthritis     Last assessed: 11/17/16    Asthma     Depression     Hypertension     Seizures ([address] )     Stroke syndrome        Past Surgical History:  Past Surgical History:   Procedure Laterality Date    ANKLE SURGERY      COLONOSCOPY  2009    Due 2014    HERNIA REPAIR      TOTAL HIP ARTHROPLASTY Left 02/2014       Family History:  Family History   Problem Relation Age of Onset    Anxiety disorder Mother         Symptom/disorder    Hypertension Mother         Benign Essential    Mitral valve prolapse Mother         Echo/Systolic    PABLO disease Mother     Fibromyalgia Mother     Hyperlipidemia Mother     Diabetes Father         Mellitus    Cancer Brother     Stroke Family         CVA    Cancer Family     Sudden death Family         Instantaneous Cardiac Death    Other Family         Connective Tissue Defect       Social History:  Social History     Social History    Marital status:  Spouse name: N/A    Number of children: N/A    Years of education: Bachelor's Degree     Occupational History    Not on file  Social History Main Topics    Smoking status: Never Smoker    Smokeless tobacco: Never Used      Comment: Per Allscripts: Former smoker    Alcohol use Yes      Comment: Occasionally    Drug use: Yes     Types: Marijuana    Sexual activity: Not on file     Other Topics Concern    Not on file     Social History Narrative    Daily coffee consumption: 7 cups/day    Per Allscripts: Currently        Objective     Vitals:    10/22/18 0727 10/22/18 0753   BP: 96/72 106/70   Pulse: 68    Resp: 14    Temp: (!) 96 9 °F (36 1 °C)    TempSrc: Tympanic    Weight: 90 3 kg (199 lb)    Height: 6' 2" (1 88 m)      Wt Readings from Last 3 Encounters:   10/22/18 90 3 kg (199 lb)   07/19/18 89 5 kg (197 lb 6 4 oz)   04/16/18 91 7 kg (202 lb 3 2 oz)       Physical Exam   Constitutional: He is oriented to person, place, and time  He appears well-developed and well-nourished  HENT:   Head: Normocephalic and atraumatic  Eyes: Conjunctivae are normal    Neck: Neck supple  Carotid bruit is not present  Cardiovascular: Normal rate, regular rhythm and normal heart sounds  No murmur heard  Pulmonary/Chest: Effort normal and breath sounds normal  No respiratory distress  He has no wheezes  He has no rales  Abdominal: Bowel sounds are normal  He exhibits no distension and no abdominal bruit  Musculoskeletal: Normal range of motion  He exhibits no edema  Neurological: He is alert and oriented to person, place, and time  No cranial nerve deficit  Psychiatric: He has a normal mood and affect  His behavior is normal    Nursing note and vitals reviewed        Pertinent Laboratory/Diagnostic Studies:  Lab Results   Component Value Date    GLUCOSE 83 12/18/2015    BUN 21 04/26/2018    CREATININE 0 82 04/26/2018    CALCIUM 8 8 04/26/2018     04/26/2018    K 4 4 04/26/2018    CO2 26 04/26/2018     04/26/2018     Lab Results   Component Value Date    ALT 17 04/26/2018    AST 16 04/26/2018    ALKPHOS 56 04/26/2018    BILITOT 0 54 12/18/2015       Lab Results   Component Value Date    WBC 8 28 04/26/2018    HGB 12 9 04/26/2018    HCT 40 8 04/26/2018    MCV 92 04/26/2018     04/26/2018       No results found for: TSH    Lab Results   Component Value Date    CHOL 200 12/18/2015     Lab Results   Component Value Date    TRIG 83 04/26/2018     Lab Results   Component Value Date    HDL 40 04/26/2018     Lab Results   Component Value Date    LDLCALC 121 (H) 04/26/2018     Lab Results   Component Value Date    HGBA1C 4 9 03/18/2014       Results for orders placed or performed in visit on 04/26/18   CBC   Result Value Ref Range    WBC 8 28 4 31 - 10 16 Thousand/uL    RBC 4 44 3 88 - 5 62 Million/uL    Hemoglobin 12 9 12 0 - 17 0 g/dL    Hematocrit 40 8 36 5 - 49 3 %    MCV 92 82 - 98 fL    MCH 29 1 26 8 - 34 3 pg    MCHC 31 6 31 4 - 37 4 g/dL    RDW 13 6 11 6 - 15 1 %    Platelets 655 533 - 711 Thousands/uL    MPV 10 6 8 9 - 12 7 fL   Comprehensive metabolic panel   Result Value Ref Range    Sodium 137 136 - 145 mmol/L    Potassium 4 4 3 5 - 5 3 mmol/L    Chloride 103 100 - 108 mmol/L    CO2 26 21 - 32 mmol/L    ANION GAP 8 4 - 13 mmol/L    BUN 21 5 - 25 mg/dL    Creatinine 0 82 0 60 - 1 30 mg/dL    Glucose, Fasting 92 65 - 99 mg/dL    Calcium 8 8 8 3 - 10 1 mg/dL    AST 16 5 - 45 U/L    ALT 17 12 - 78 U/L    Alkaline Phosphatase 56 46 - 116 U/L    Total Protein 7 2 6 4 - 8 2 g/dL    Albumin 4 0 3 5 - 5 0 g/dL    Total Bilirubin 0 57 0 20 - 1 00 mg/dL    eGFR 96 ml/min/1 73sq m   TSH, 3rd generation   Result Value Ref Range    TSH 3RD GENERATON 2 780 0 358 - 3 740 uIU/mL   Lipid Panel with Direct LDL reflex   Result Value Ref Range    Cholesterol 178 50 - 200 mg/dL    Triglycerides 83 <=150 mg/dL    HDL, Direct 40 40 - 60 mg/dL    LDL Calculated 121 (H) 0 - 100 mg/dL       Orders Placed This Encounter   Procedures    [facility]    influenza vaccine, [id number], quadrivalent, recombinant, PF, 0 5 mL, for patients 18 yr+ (FLUBLOK)       ALLERGIES:  Allergies   Allergen Reactions    Gluten Meal Other (See Comments)       Current Medications     Current Outpatient Prescriptions   Medication Sig Dispense Refill    aspirin 81 MG tablet Take 81 mg by mouth daily   calcium-vitamin D (OSCAL 500 + D) 500 mg-200 units per tablet Take 1 tablet by mouth daily   ezetimibe (ZETIA) 10 mg tablet TAKE ONE TABLET BY MOUTH EVERY DAY 30 tablet 5    levETIRAcetam (KEPPRA) 500 mg tablet Take 2 tablets (1,000 mg total) by mouth every 12 (twelve) hours 360 tablet 3    lisinopril (ZESTRIL) 2 5 mg tablet Take 1 tablet (2 5 mg total) by mouth daily 90 tablet 2    meloxicam (MOBIC) 15 mg tablet Take 15 mg by mouth daily   gabapentin (NEURONTIN) 300 mg capsule TAKE ONE CAPSULE BY MOUTH EVERY MORNING, ONE CAPSULE AT MIDDAY, TAKE TWO CAPSULES BY MOUTH AT BEDTIME 120 capsule 3     No current facility-administered medications for this visit            Health Maintenance     Health Maintenance   Topic Date Due    SLP PLAN OF CARE  1955    DTaP,Tdap,and Td Vaccines (1 - Tdap) 07/22/1978    INFLUENZA VACCINE  07/01/2018    Depression Screening PHQ  07/19/2019    CRC Screening: Colonoscopy  10/17/2019     Immunization History   Administered Date(s) Administered    Influenza TIV (IM) 10/09/2014, 09/16/2015       Haley Griffin MD

## 2018-10-23 ENCOUNTER — APPOINTMENT (OUTPATIENT)
Dept: LAB | Facility: CLINIC | Age: 63
End: 2018-10-23
Payer: COMMERCIAL

## 2018-10-23 ENCOUNTER — TRANSCRIBE ORDERS (OUTPATIENT)
Dept: LAB | Facility: CLINIC | Age: 63
End: 2018-10-23

## 2018-10-23 DIAGNOSIS — Z12.5 ENCOUNTER FOR PROSTATE CANCER SCREENING: ICD-10-CM

## 2018-10-23 DIAGNOSIS — E78.5 HYPERLIPIDEMIA, UNSPECIFIED HYPERLIPIDEMIA TYPE: ICD-10-CM

## 2018-10-23 DIAGNOSIS — I10 ESSENTIAL HYPERTENSION: ICD-10-CM

## 2018-10-23 LAB
ALBUMIN SERPL BCP-MCNC: 3.7 G/DL (ref 3.5–5)
ALP SERPL-CCNC: 58 U/L (ref 46–116)
ALT SERPL W P-5'-P-CCNC: 18 U/L (ref 12–78)
ANION GAP SERPL CALCULATED.3IONS-SCNC: 6 MMOL/L (ref 4–13)
AST SERPL W P-5'-P-CCNC: 17 U/L (ref 5–45)
BILIRUB SERPL-MCNC: 0.45 MG/DL (ref 0.2–1)
BUN SERPL-MCNC: 14 MG/DL (ref 5–25)
CALCIUM SERPL-MCNC: 8.3 MG/DL (ref 8.3–10.1)
CHLORIDE SERPL-SCNC: 104 MMOL/L (ref 100–108)
CHOLEST SERPL-MCNC: 145 MG/DL (ref 50–200)
CO2 SERPL-SCNC: 29 MMOL/L (ref 21–32)
CREAT SERPL-MCNC: 0.82 MG/DL (ref 0.6–1.3)
GFR SERPL CREATININE-BSD FRML MDRD: 95 ML/MIN/1.73SQ M
GLUCOSE P FAST SERPL-MCNC: 80 MG/DL (ref 65–99)
HDLC SERPL-MCNC: 42 MG/DL (ref 40–60)
LDLC SERPL CALC-MCNC: 94 MG/DL (ref 0–100)
NONHDLC SERPL-MCNC: 103 MG/DL
POTASSIUM SERPL-SCNC: 3.9 MMOL/L (ref 3.5–5.3)
PROT SERPL-MCNC: 6.6 G/DL (ref 6.4–8.2)
PSA SERPL-MCNC: 2.7 NG/ML (ref 0–4)
SODIUM SERPL-SCNC: 139 MMOL/L (ref 136–145)
TRIGL SERPL-MCNC: 45 MG/DL

## 2018-10-23 PROCEDURE — 36415 COLL VENOUS BLD VENIPUNCTURE: CPT

## 2018-10-23 PROCEDURE — G0103 PSA SCREENING: HCPCS

## 2018-10-23 PROCEDURE — 80061 LIPID PANEL: CPT

## 2018-10-23 PROCEDURE — 80053 COMPREHEN METABOLIC PANEL: CPT

## 2018-10-26 ENCOUNTER — TELEPHONE (OUTPATIENT)
Dept: UROLOGY | Facility: MEDICAL CENTER | Age: 63
End: 2018-10-26

## 2018-10-26 ENCOUNTER — TELEPHONE (OUTPATIENT)
Dept: FAMILY MEDICINE CLINIC | Facility: CLINIC | Age: 63
End: 2018-10-26

## 2018-10-26 DIAGNOSIS — R97.20 INCREASED PROSTATE SPECIFIC ANTIGEN (PSA) VELOCITY: Primary | ICD-10-CM

## 2018-10-26 NOTE — TELEPHONE ENCOUNTER
Reason for appointment/Complaint/Diagnosis : Elevated PSA    Insurance: [address]  If yes, what kind? N/A    Previous urologist?     No                  Records requested/where? In EPIC    Outside testing/where? N/A     [place] Pre[place] for [place]  [facility] [place]

## 2018-10-26 NOTE — TELEPHONE ENCOUNTER
----- Message from Gayatri Vital MD sent at 10/26/2018 11:12 AM EDT -----  Please contact patient  His CMP and lipid panel were normal   His PSA is 2 7 which went up from 1 5 a year ago  This represents slightly high at PSA rise than expected  It could be related to age-related prostate enlargement    I would like patient to be further evaluated by urologist   Thank you  I will place orders for [facility]

## 2018-10-30 DIAGNOSIS — M51.36 DDD (DEGENERATIVE DISC DISEASE), LUMBAR: Primary | ICD-10-CM

## 2018-10-31 RX ORDER — MELOXICAM 7.5 MG/1
TABLET ORAL
Qty: 60 TABLET | Refills: 3 | Status: SHIPPED | OUTPATIENT
Start: 2018-10-31 | End: 2019-05-02 | Stop reason: SDUPTHER

## 2018-11-15 NOTE — PROGRESS NOTES
11/19/2018    Ashish Dutta  1955  [id number]    Discussion and Plan    Absent any significant findings on physical examination, I have advised repeating PSA in the next 4-6 weeks  If elevation persists, transrectal biopsies may be necessary  This would have to be performed under sedation given patient's anxiety relative to the examination  All questions answered at this time  1  Increased prostate specific antigen (PSA) velocity  - Ambulatory referral to Urology  - PSA Total, Diagnostic; Future    Assessment      Patient Active Problem List   Diagnosis    DDD (degenerative disc disease), cervical    Complex partial seizure evolving to generalized seizure ([address] )    Chronic pain disorder    DDD (degenerative disc disease), lumbosacral    Hyperlipidemia    Hypertension    Aneurysm of ascending aorta (HCC)    Numbness and tingling in both hands    Polyneuropathy    Increased prostate specific antigen (PSA) velocity       History of Present Illness    Jorge Boucher is a 66 y o  male seen today in regards to a history of a progressive rise in PSA from 1 5-2 7  He denies any significant urinary complaints  No history of hematuria urinary tract infection  No prior genitourinary surgical history  Denies family history of prostate cancer  He does not recall any recent systemic illness      Urinary Symptom Assessment        Past Medical History  Past Medical History:   Diagnosis Date    Anxiety disorder     Arthritis     Last assessed: 11/17/16    Asthma     Depression     Hypertension     Seizures ([address] )     Stroke syndrome        Past Social History  Past Surgical History:   Procedure Laterality Date    ANKLE SURGERY      COLONOSCOPY  2009 Due 2014    HERNIA REPAIR      TOTAL HIP ARTHROPLASTY Left 02/2014       Past Family History  Family History   Problem Relation Age of Onset    Anxiety disorder Mother         Symptom/disorder    Hypertension Mother         Benign Essential    Mitral valve prolapse Mother         Echo/Systolic    PABLO disease Mother     Fibromyalgia Mother     Hyperlipidemia Mother     Diabetes Father         Mellitus    Cancer Brother     Stroke Family         CVA    Cancer Family     Sudden death Family         Instantaneous Cardiac Death    Other Family         Connective Tissue Defect       Past Social history  Social History     Social History    Marital status:      Spouse name: N/A    Number of children: N/A    Years of education: Bachelor's Degree     Occupational History    Not on file  Social History Main Topics    Smoking status: Never Smoker    Smokeless tobacco: Never Used      Comment: Per Allscripts: Former smoker    Alcohol use Yes      Comment: Occasionally    Drug use: Yes     Types: Marijuana    Sexual activity: Not on file     Other Topics Concern    Not on file     Social History Narrative    Daily coffee consumption: 7 cups/day    Per Allscripts: Currently        Current Medications  Current Outpatient Prescriptions   Medication Sig Dispense Refill    aspirin 81 MG tablet Take 81 mg by mouth daily   calcium-vitamin D (OSCAL 500 + D) 500 mg-200 units per tablet Take 1 tablet by mouth daily   ezetimibe (ZETIA) 10 mg tablet TAKE ONE TABLET BY MOUTH EVERY DAY 30 tablet 5    gabapentin (NEURONTIN) 300 mg capsule TAKE ONE CAPSULE BY MOUTH EVERY MORNING, ONE CAPSULE AT MIDDAY, TAKE TWO CAPSULES BY MOUTH AT BEDTIME 120 capsule 3    levETIRAcetam (KEPPRA) 500 mg tablet Take 2 tablets (1,000 mg total) by mouth every 12 (twelve) hours 360 tablet 3    lisinopril (ZESTRIL) 2 5 mg tablet Take 1 tablet (2 5 mg total) by mouth daily 90 tablet 2    meloxicam (MOBIC) 7 5 mg tablet Take 1 tablet twice a day after food as needed for joint pain 60 tablet 3     No current facility-administered medications for this visit          Allergies  Allergies   Allergen Reactions    Gluten Meal Other (See Comments)       Past Medical History, Social History, Family History, medications and allergies were reviewed  Review of Systems  Review of Systems   Constitutional: Negative  HENT: Negative  Eyes: Negative  Respiratory: Negative  Cardiovascular: Negative  Gastrointestinal: Negative  Endocrine: Negative  Genitourinary: Negative for decreased urine volume, difficulty urinating, hematuria and urgency  Musculoskeletal: Negative  Skin: Negative  Neurological: Negative  Hematological: Negative  Psychiatric/Behavioral: Negative  Vitals  Vitals:    11/19/18 1017   BP: 118/74   BP Location: Left arm   Patient Position: Sitting   Cuff Size: Adult   Pulse: 72   Weight: 89 3 kg (196 lb 12 8 oz)   Height: 6' 2" (1 88 m)         Physical Exam    Physical Exam   Constitutional: He is oriented to person, place, and time  He appears well-developed and well-nourished  HENT:   Head: Normocephalic and atraumatic  Eyes: Pupils are equal, round, and reactive to light  Neck: Normal range of motion  Cardiovascular: Normal rate, regular rhythm and normal heart sounds  Pulmonary/Chest: Effort normal and breath sounds normal  No accessory muscle usage  No respiratory distress  Abdominal: Soft  Normal appearance and bowel sounds are normal  There is no tenderness  Genitourinary: Rectum normal, prostate normal and penis normal  No penile tenderness  Genitourinary Comments: Limited examination secondary to patient's anxiety however prostate was about35 g with no distinct nodules identified  Musculoskeletal: Normal range of motion  Neurological: He is alert and oriented to person, place, and time  Skin: Skin is warm, dry and intact  Psychiatric: He has a normal mood and affect  His speech is normal  Cognition and memory are normal    Nursing note and vitals reviewed        Results    Below listed labs, pathology results, and radiology images were personally reviewed:    Lab Results   Component Value Date/Time PSA 2 7 10/23/2018 07:59 AM    PSA 1 4 06/19/2015 09:59 AM     Lab Results   Component Value Date    GLUCOSE 83 12/18/2015    CALCIUM 8 3 10/23/2018     12/18/2015    K 3 9 10/23/2018    CO2 29 10/23/2018     10/23/2018    BUN 14 10/23/2018    CREATININE 0 82 10/23/2018     Lab Results   Component Value Date    WBC 8 28 04/26/2018    HGB 12 9 04/26/2018    HCT 40 8 04/26/2018    MCV 92 04/26/2018     04/26/2018       No results found for this or any previous visit (from the past 1 hour(s)) ]    Component      Latest Ref Rng & Units 8/30/2016 9/21/2017 10/23/2018   PSA, Total      0 0 - 4 0 ng/mL 1 4 1 5 2 7

## 2018-11-19 ENCOUNTER — TELEPHONE (OUTPATIENT)
Dept: UROLOGY | Facility: AMBULATORY SURGERY CENTER | Age: 63
End: 2018-11-19

## 2018-11-19 ENCOUNTER — OFFICE VISIT (OUTPATIENT)
Dept: UROLOGY | Facility: AMBULATORY SURGERY CENTER | Age: 63
End: 2018-11-19
Payer: COMMERCIAL

## 2018-11-19 VITALS
HEIGHT: 74 IN | BODY MASS INDEX: 25.26 KG/M2 | WEIGHT: 196.8 LBS | HEART RATE: 72 BPM | DIASTOLIC BLOOD PRESSURE: 74 MMHG | SYSTOLIC BLOOD PRESSURE: 118 MMHG

## 2018-11-19 DIAGNOSIS — R97.20 INCREASED PROSTATE SPECIFIC ANTIGEN (PSA) VELOCITY: ICD-10-CM

## 2018-11-19 PROCEDURE — 99204 OFFICE O/P NEW MOD 45 MIN: CPT | Performed by: UROLOGY

## 2018-11-19 NOTE — TELEPHONE ENCOUNTER
Return in about 1 month (around 12/19/2018) for FU with PA/NP  Patient does not want to go to [facility]  Morning appointments are better   Please advise

## 2018-12-04 ENCOUNTER — TRANSCRIBE ORDERS (OUTPATIENT)
Dept: LAB | Facility: CLINIC | Age: 63
End: 2018-12-04

## 2018-12-04 ENCOUNTER — APPOINTMENT (OUTPATIENT)
Dept: LAB | Facility: CLINIC | Age: 63
End: 2018-12-04
Payer: COMMERCIAL

## 2018-12-04 DIAGNOSIS — R97.20 INCREASED PROSTATE SPECIFIC ANTIGEN (PSA) VELOCITY: ICD-10-CM

## 2018-12-04 LAB — PSA SERPL-MCNC: 1.6 NG/ML (ref 0–4)

## 2018-12-04 PROCEDURE — 84153 ASSAY OF PSA TOTAL: CPT

## 2018-12-19 NOTE — PROGRESS NOTES
12/20/2018    Marcelinadon Prom  1955  [id number]      Assessment  -Elevated PSA    Discussion/Plan  Viki Del Toro is a 66 y o  male being managed by Dr Emma Alcantara        -We reviewed results of recent PSA which is 1 6, previously 2 7  Patient states that he might have engaged in ejaculation prior to obtaining lab work, which could have caused falsely elevated PSA  He denies any urinary complaints  We will resume yearly prostate cancer screening  Follow-up in 1 year with repeat PSA and DYLLAN  Patient was instructed to call with any issues  -All questions answered, patient agrees with plan      History of Present Illness  66 y o  male with a history of elevated PSA presents today for follow up  At last office visit, patient's PSA had increased from 1 5 to 2 7  DYLLAN negative for any significant findings  Patient denies any lower urinary tract symptoms, gross hematuria, or dysuria  No reports of strong family history of prostate cancer  Review of Systems  Review of Systems   Constitutional: Negative  HENT: Negative  Respiratory: Negative  Cardiovascular: Negative  Gastrointestinal: Negative  Genitourinary: Negative for decreased urine volume, difficulty urinating, dysuria, flank pain, frequency, hematuria and urgency  Musculoskeletal: Negative  Skin: Negative  Neurological: Negative  Psychiatric/Behavioral: Negative            Past Medical History  Past Medical History:   Diagnosis Date    Anxiety disorder     Arthritis     Last assessed: 11/17/16    Asthma     Depression     Hypertension     Seizures ([address] )     Stroke syndrome        Past Social History  Past Surgical History:   Procedure Laterality Date    ANKLE SURGERY      COLONOSCOPY  2009 Due 2014    HERNIA REPAIR      TOTAL HIP ARTHROPLASTY Left 02/2014       Past Family History  Family History   Problem Relation Age of Onset    Anxiety disorder Mother         Symptom/disorder    Hypertension Mother         Benign Essential    Mitral valve prolapse Mother         Echo/Systolic    PABLO disease Mother     Fibromyalgia Mother     Hyperlipidemia Mother     Diabetes Father         Mellitus    Cancer Brother     Stroke Family         CVA    Cancer Family     Sudden death Family         Instantaneous Cardiac Death    Other Family         Connective Tissue Defect       Past Social history  Social History     Social History    Marital status:      Spouse name: N/A    Number of children: N/A    Years of education: Bachelor's Degree     Occupational History    Not on file  Social History Main Topics    Smoking status: Never Smoker    Smokeless tobacco: Never Used      Comment: Per Allscripts: Former smoker    Alcohol use Yes      Comment: Occasionally    Drug use: Yes     Types: Marijuana    Sexual activity: Not on file     Other Topics Concern    Not on file     Social History Narrative    Daily coffee consumption: 7 cups/day    Per Allscripts: Currently        Current Medications  Current Outpatient Prescriptions   Medication Sig Dispense Refill    aspirin 81 MG tablet Take 81 mg by mouth daily   calcium-vitamin D (OSCAL 500 + D) 500 mg-200 units per tablet Take 1 tablet by mouth daily   ezetimibe (ZETIA) 10 mg tablet TAKE ONE TABLET BY MOUTH EVERY DAY 30 tablet 5    gabapentin (NEURONTIN) 300 mg capsule TAKE ONE CAPSULE BY MOUTH EVERY MORNING, ONE CAPSULE AT MIDDAY, TAKE TWO CAPSULES BY MOUTH AT BEDTIME 120 capsule 3    levETIRAcetam (KEPPRA) 500 mg tablet Take 2 tablets (1,000 mg total) by mouth every 12 (twelve) hours 360 tablet 3    lisinopril (ZESTRIL) 2 5 mg tablet Take 1 tablet (2 5 mg total) by mouth daily 90 tablet 2    meloxicam (MOBIC) 7 5 mg tablet Take 1 tablet twice a day after food as needed for joint pain 60 tablet 3     No current facility-administered medications for this visit          Allergies  Allergies   Allergen Reactions    Gluten Meal Other (See Comments) Past Medical History, Social History, Family History, medications and allergies were reviewed  Vitals  There were no vitals filed for this visit  Physical Exam  Physical Exam   Constitutional: He is oriented to person, place, and time  He appears well-developed and well-nourished  HENT:   Head: Normocephalic  Eyes: Pupils are equal, round, and reactive to light  Neck: Normal range of motion  Cardiovascular: Normal rate and regular rhythm  Pulmonary/Chest: Effort normal    Abdominal: Soft  Normal appearance  There is no CVA tenderness  Musculoskeletal: Normal range of motion  Neurological: He is alert and oriented to person, place, and time  Skin: Skin is warm and dry  Psychiatric: He has a normal mood and affect  His behavior is normal  Judgment and thought content normal        Results    I have personally reviewed all pertinent lab results and reviewed with patient  Lab Results   Component Value Date    PSA 1 6 12/04/2018    PSA 2 7 10/23/2018    PSA 1 5 09/21/2017     Lab Results   Component Value Date    GLUCOSE 83 12/18/2015    CALCIUM 8 3 10/23/2018     12/18/2015    K 3 9 10/23/2018    CO2 29 10/23/2018     10/23/2018    BUN 14 10/23/2018    CREATININE 0 82 10/23/2018     Lab Results   Component Value Date    WBC 8 28 04/26/2018    HGB 12 9 04/26/2018    HCT 40 8 04/26/2018    MCV 92 04/26/2018     04/26/2018     No results found for this or any previous visit (from the past 1 hour(s))

## 2018-12-20 ENCOUNTER — OFFICE VISIT (OUTPATIENT)
Dept: UROLOGY | Facility: AMBULATORY SURGERY CENTER | Age: 63
End: 2018-12-20
Payer: COMMERCIAL

## 2018-12-20 VITALS
BODY MASS INDEX: 25.67 KG/M2 | WEIGHT: 200 LBS | HEART RATE: 68 BPM | HEIGHT: 74 IN | DIASTOLIC BLOOD PRESSURE: 70 MMHG | SYSTOLIC BLOOD PRESSURE: 118 MMHG

## 2018-12-20 DIAGNOSIS — R97.20 ELEVATED PSA: ICD-10-CM

## 2018-12-20 DIAGNOSIS — E78.5 HYPERLIPIDEMIA, UNSPECIFIED HYPERLIPIDEMIA TYPE: ICD-10-CM

## 2018-12-20 DIAGNOSIS — Z12.5 SCREENING FOR PROSTATE CANCER: Primary | ICD-10-CM

## 2018-12-20 PROCEDURE — 99212 OFFICE O/P EST SF 10 MIN: CPT | Performed by: NURSE PRACTITIONER

## 2018-12-21 RX ORDER — EZETIMIBE 10 MG/1
TABLET ORAL
Qty: 30 TABLET | Refills: 5 | Status: SHIPPED | OUTPATIENT
Start: 2018-12-21 | End: 2019-06-24 | Stop reason: SDUPTHER

## 2019-02-13 ENCOUNTER — TELEPHONE (OUTPATIENT)
Dept: FAMILY MEDICINE CLINIC | Facility: CLINIC | Age: 64
End: 2019-02-13

## 2019-02-13 DIAGNOSIS — Z96.649 AFTERCARE FOLLOWING HIP JOINT REPLACEMENT SURGERY, UNSPECIFIED LATERALITY: Primary | ICD-10-CM

## 2019-02-13 DIAGNOSIS — Z47.1 AFTERCARE FOLLOWING HIP JOINT REPLACEMENT SURGERY, UNSPECIFIED LATERALITY: Primary | ICD-10-CM

## 2019-02-13 RX ORDER — AMOXICILLIN 500 MG/1
TABLET, FILM COATED ORAL
Qty: 20 TABLET | Refills: 1 | Status: SHIPPED | OUTPATIENT
Start: 2019-02-13 | End: 2020-02-13

## 2019-02-13 NOTE — PROGRESS NOTES
I spoke with patient  He is requesting antibiotic medication prior to dental work  History of hip replacement  He was prescribed clindamycin in the past   Patient does not have any allergies to penicillin  Will use amoxicillin 2 g 1 hour prior to dental procedure  Patient understands instructions and agrees

## 2019-02-13 NOTE — TELEPHONE ENCOUNTER
Patient walked in office wanting pre-dental medication  He has apt  Tomorrow and needs his meds but doesn't know what it is  He is in waiting room

## 2019-04-17 DIAGNOSIS — M50.30 DDD (DEGENERATIVE DISC DISEASE), CERVICAL: ICD-10-CM

## 2019-04-17 DIAGNOSIS — I10 ESSENTIAL HYPERTENSION: ICD-10-CM

## 2019-04-17 RX ORDER — LISINOPRIL 5 MG/1
TABLET ORAL
Qty: 90 TABLET | Refills: 1 | Status: SHIPPED | OUTPATIENT
Start: 2019-04-17 | End: 2019-04-22 | Stop reason: ALTCHOICE

## 2019-04-17 RX ORDER — GABAPENTIN 300 MG/1
CAPSULE ORAL
Qty: 120 CAPSULE | Refills: 3 | Status: SHIPPED | OUTPATIENT
Start: 2019-04-17 | End: 2019-09-04 | Stop reason: SDUPTHER

## 2019-04-18 RX ORDER — AZITHROMYCIN 250 MG/1
TABLET, FILM COATED ORAL
COMMUNITY
Start: 2019-01-23 | End: 2019-04-22 | Stop reason: ALTCHOICE

## 2019-04-22 ENCOUNTER — TRANSCRIBE ORDERS (OUTPATIENT)
Dept: LAB | Facility: CLINIC | Age: 64
End: 2019-04-22

## 2019-04-22 ENCOUNTER — TELEPHONE (OUTPATIENT)
Dept: FAMILY MEDICINE CLINIC | Facility: CLINIC | Age: 64
End: 2019-04-22

## 2019-04-22 ENCOUNTER — OFFICE VISIT (OUTPATIENT)
Dept: FAMILY MEDICINE CLINIC | Facility: CLINIC | Age: 64
End: 2019-04-22
Payer: COMMERCIAL

## 2019-04-22 ENCOUNTER — TELEPHONE (OUTPATIENT)
Dept: NEUROLOGY | Facility: CLINIC | Age: 64
End: 2019-04-22

## 2019-04-22 ENCOUNTER — APPOINTMENT (OUTPATIENT)
Dept: LAB | Facility: CLINIC | Age: 64
End: 2019-04-22
Payer: COMMERCIAL

## 2019-04-22 VITALS
WEIGHT: 197.4 LBS | RESPIRATION RATE: 14 BRPM | SYSTOLIC BLOOD PRESSURE: 128 MMHG | HEART RATE: 64 BPM | HEIGHT: 74 IN | DIASTOLIC BLOOD PRESSURE: 78 MMHG | TEMPERATURE: 94.5 F | BODY MASS INDEX: 25.33 KG/M2

## 2019-04-22 DIAGNOSIS — G89.4 CHRONIC PAIN DISORDER: ICD-10-CM

## 2019-04-22 DIAGNOSIS — I71.2 ANEURYSM OF ASCENDING AORTA (HCC): Primary | ICD-10-CM

## 2019-04-22 DIAGNOSIS — I10 ESSENTIAL HYPERTENSION: ICD-10-CM

## 2019-04-22 DIAGNOSIS — R97.20 INCREASED PROSTATE SPECIFIC ANTIGEN (PSA) VELOCITY: ICD-10-CM

## 2019-04-22 DIAGNOSIS — G40.209 COMPLEX PARTIAL SEIZURE EVOLVING TO GENERALIZED SEIZURE (HCC): ICD-10-CM

## 2019-04-22 DIAGNOSIS — Z79.899 MEDICAL MARIJUANA USE: ICD-10-CM

## 2019-04-22 DIAGNOSIS — M50.30 DDD (DEGENERATIVE DISC DISEASE), CERVICAL: ICD-10-CM

## 2019-04-22 DIAGNOSIS — E78.5 HYPERLIPIDEMIA, UNSPECIFIED HYPERLIPIDEMIA TYPE: ICD-10-CM

## 2019-04-22 DIAGNOSIS — M51.37 DDD (DEGENERATIVE DISC DISEASE), LUMBOSACRAL: ICD-10-CM

## 2019-04-22 LAB
ALBUMIN SERPL BCP-MCNC: 3.9 G/DL (ref 3.5–5)
ALP SERPL-CCNC: 59 U/L (ref 46–116)
ALT SERPL W P-5'-P-CCNC: 17 U/L (ref 12–78)
ANION GAP SERPL CALCULATED.3IONS-SCNC: 5 MMOL/L (ref 4–13)
AST SERPL W P-5'-P-CCNC: 17 U/L (ref 5–45)
BASOPHILS # BLD AUTO: 0.07 THOUSANDS/ΜL (ref 0–0.1)
BASOPHILS NFR BLD AUTO: 1 % (ref 0–1)
BILIRUB SERPL-MCNC: 0.49 MG/DL (ref 0.2–1)
BUN SERPL-MCNC: 20 MG/DL (ref 5–25)
CALCIUM SERPL-MCNC: 8.2 MG/DL (ref 8.3–10.1)
CHLORIDE SERPL-SCNC: 106 MMOL/L (ref 100–108)
CHOLEST SERPL-MCNC: 160 MG/DL (ref 50–200)
CO2 SERPL-SCNC: 27 MMOL/L (ref 21–32)
CREAT SERPL-MCNC: 0.83 MG/DL (ref 0.6–1.3)
EOSINOPHIL # BLD AUTO: 0.27 THOUSAND/ΜL (ref 0–0.61)
EOSINOPHIL NFR BLD AUTO: 4 % (ref 0–6)
ERYTHROCYTE [DISTWIDTH] IN BLOOD BY AUTOMATED COUNT: 13.2 % (ref 11.6–15.1)
GFR SERPL CREATININE-BSD FRML MDRD: 95 ML/MIN/1.73SQ M
GLUCOSE P FAST SERPL-MCNC: 85 MG/DL (ref 65–99)
HCT VFR BLD AUTO: 39.2 % (ref 36.5–49.3)
HDLC SERPL-MCNC: 39 MG/DL (ref 40–60)
HGB BLD-MCNC: 12.5 G/DL (ref 12–17)
IMM GRANULOCYTES # BLD AUTO: 0.02 THOUSAND/UL (ref 0–0.2)
IMM GRANULOCYTES NFR BLD AUTO: 0 % (ref 0–2)
LDLC SERPL CALC-MCNC: 104 MG/DL (ref 0–100)
LYMPHOCYTES # BLD AUTO: 2.06 THOUSANDS/ΜL (ref 0.6–4.47)
LYMPHOCYTES NFR BLD AUTO: 27 % (ref 14–44)
MCH RBC QN AUTO: 30.6 PG (ref 26.8–34.3)
MCHC RBC AUTO-ENTMCNC: 31.9 G/DL (ref 31.4–37.4)
MCV RBC AUTO: 96 FL (ref 82–98)
MONOCYTES # BLD AUTO: 0.73 THOUSAND/ΜL (ref 0.17–1.22)
MONOCYTES NFR BLD AUTO: 10 % (ref 4–12)
NEUTROPHILS # BLD AUTO: 4.37 THOUSANDS/ΜL (ref 1.85–7.62)
NEUTS SEG NFR BLD AUTO: 58 % (ref 43–75)
NRBC BLD AUTO-RTO: 0 /100 WBCS
PLATELET # BLD AUTO: 212 THOUSANDS/UL (ref 149–390)
PMV BLD AUTO: 11.3 FL (ref 8.9–12.7)
POTASSIUM SERPL-SCNC: 4.5 MMOL/L (ref 3.5–5.3)
PROT SERPL-MCNC: 6.8 G/DL (ref 6.4–8.2)
RBC # BLD AUTO: 4.09 MILLION/UL (ref 3.88–5.62)
SODIUM SERPL-SCNC: 138 MMOL/L (ref 136–145)
TRIGL SERPL-MCNC: 87 MG/DL
TSH SERPL DL<=0.05 MIU/L-ACNC: 3.07 UIU/ML (ref 0.36–3.74)
WBC # BLD AUTO: 7.52 THOUSAND/UL (ref 4.31–10.16)

## 2019-04-22 PROCEDURE — 84443 ASSAY THYROID STIM HORMONE: CPT

## 2019-04-22 PROCEDURE — 36415 COLL VENOUS BLD VENIPUNCTURE: CPT

## 2019-04-22 PROCEDURE — 80053 COMPREHEN METABOLIC PANEL: CPT

## 2019-04-22 PROCEDURE — 85025 COMPLETE CBC W/AUTO DIFF WBC: CPT

## 2019-04-22 PROCEDURE — 99214 OFFICE O/P EST MOD 30 MIN: CPT | Performed by: FAMILY MEDICINE

## 2019-04-22 PROCEDURE — 3078F DIAST BP <80 MM HG: CPT | Performed by: FAMILY MEDICINE

## 2019-04-22 PROCEDURE — 80061 LIPID PANEL: CPT

## 2019-04-22 PROCEDURE — 3074F SYST BP LT 130 MM HG: CPT | Performed by: FAMILY MEDICINE

## 2019-04-24 PROBLEM — Z79.899 MEDICAL MARIJUANA USE: Status: ACTIVE | Noted: 2019-04-24

## 2019-04-26 ENCOUNTER — TELEPHONE (OUTPATIENT)
Dept: FAMILY MEDICINE CLINIC | Facility: CLINIC | Age: 64
End: 2019-04-26

## 2019-05-02 ENCOUNTER — OFFICE VISIT (OUTPATIENT)
Dept: NEUROLOGY | Facility: CLINIC | Age: 64
End: 2019-05-02
Payer: COMMERCIAL

## 2019-05-02 VITALS
HEART RATE: 74 BPM | DIASTOLIC BLOOD PRESSURE: 68 MMHG | WEIGHT: 197 LBS | SYSTOLIC BLOOD PRESSURE: 135 MMHG | HEIGHT: 74 IN | BODY MASS INDEX: 25.28 KG/M2

## 2019-05-02 DIAGNOSIS — G40.209 COMPLEX PARTIAL SEIZURE EVOLVING TO GENERALIZED SEIZURE (HCC): ICD-10-CM

## 2019-05-02 DIAGNOSIS — M51.36 DDD (DEGENERATIVE DISC DISEASE), LUMBAR: ICD-10-CM

## 2019-05-02 DIAGNOSIS — Z79.899 MEDICAL MARIJUANA USE: ICD-10-CM

## 2019-05-02 DIAGNOSIS — M51.37 DDD (DEGENERATIVE DISC DISEASE), LUMBOSACRAL: Primary | ICD-10-CM

## 2019-05-02 PROCEDURE — 99214 OFFICE O/P EST MOD 30 MIN: CPT | Performed by: PSYCHIATRY & NEUROLOGY

## 2019-05-02 RX ORDER — AMOXICILLIN 500 MG
CAPSULE ORAL DAILY
COMMUNITY

## 2019-05-02 RX ORDER — MELOXICAM 7.5 MG/1
TABLET ORAL
Qty: 60 TABLET | Refills: 3 | Status: SHIPPED | OUTPATIENT
Start: 2019-05-02 | End: 2019-10-03 | Stop reason: SDUPTHER

## 2019-05-02 RX ORDER — LEVETIRACETAM 500 MG/1
1000 TABLET ORAL EVERY 12 HOURS SCHEDULED
Qty: 360 TABLET | Refills: 3 | Status: SHIPPED | OUTPATIENT
Start: 2019-05-02 | End: 2020-03-02

## 2019-05-02 RX ORDER — CHOLECALCIFEROL (VITAMIN D3) 125 MCG
2000 CAPSULE ORAL DAILY
COMMUNITY

## 2019-05-06 ENCOUNTER — HOSPITAL ENCOUNTER (OUTPATIENT)
Dept: NON INVASIVE DIAGNOSTICS | Facility: HOSPITAL | Age: 64
Discharge: HOME | End: 2019-05-06
Payer: COMMERCIAL

## 2019-05-06 DIAGNOSIS — I71.2 ANEURYSM OF ASCENDING AORTA (HCC): ICD-10-CM

## 2019-05-06 PROCEDURE — 93306 TTE W/DOPPLER COMPLETE: CPT | Performed by: INTERNAL MEDICINE

## 2019-05-06 PROCEDURE — 93306 TTE W/DOPPLER COMPLETE: CPT

## 2019-05-13 ENCOUNTER — TELEPHONE (OUTPATIENT)
Dept: FAMILY MEDICINE CLINIC | Facility: CLINIC | Age: 64
End: 2019-05-13

## 2019-06-17 ENCOUNTER — TELEPHONE (OUTPATIENT)
Dept: FAMILY MEDICINE CLINIC | Facility: CLINIC | Age: 64
End: 2019-06-17

## 2019-06-18 ENCOUNTER — APPOINTMENT (OUTPATIENT)
Dept: RADIOLOGY | Facility: AMBULARY SURGERY CENTER | Age: 64
End: 2019-06-18
Attending: ORTHOPAEDIC SURGERY
Payer: COMMERCIAL

## 2019-06-18 ENCOUNTER — OFFICE VISIT (OUTPATIENT)
Dept: OBGYN CLINIC | Facility: CLINIC | Age: 64
End: 2019-06-18
Payer: COMMERCIAL

## 2019-06-18 VITALS
HEART RATE: 56 BPM | WEIGHT: 197 LBS | HEIGHT: 74 IN | BODY MASS INDEX: 25.28 KG/M2 | DIASTOLIC BLOOD PRESSURE: 80 MMHG | SYSTOLIC BLOOD PRESSURE: 115 MMHG

## 2019-06-18 DIAGNOSIS — Z96.661 H/O TOTAL ANKLE REPLACEMENT, RIGHT: ICD-10-CM

## 2019-06-18 DIAGNOSIS — M25.571 PAIN, JOINT, ANKLE AND FOOT, RIGHT: ICD-10-CM

## 2019-06-18 DIAGNOSIS — Z98.890 HISTORY OF FAILED ARTHROPLASTY OF ANKLE: ICD-10-CM

## 2019-06-18 DIAGNOSIS — M25.571 PAIN, JOINT, ANKLE AND FOOT, RIGHT: Primary | ICD-10-CM

## 2019-06-18 PROCEDURE — 99213 OFFICE O/P EST LOW 20 MIN: CPT | Performed by: ORTHOPAEDIC SURGERY

## 2019-06-18 PROCEDURE — 73610 X-RAY EXAM OF ANKLE: CPT

## 2019-06-24 DIAGNOSIS — E78.5 HYPERLIPIDEMIA, UNSPECIFIED HYPERLIPIDEMIA TYPE: ICD-10-CM

## 2019-06-24 RX ORDER — EZETIMIBE 10 MG/1
TABLET ORAL
Qty: 30 TABLET | Refills: 5 | Status: SHIPPED | OUTPATIENT
Start: 2019-06-24 | End: 2020-01-30

## 2019-06-25 ENCOUNTER — TELEPHONE (OUTPATIENT)
Dept: FAMILY MEDICINE CLINIC | Facility: CLINIC | Age: 64
End: 2019-06-25

## 2019-07-08 ENCOUNTER — TRANSCRIBE ORDERS (OUTPATIENT)
Dept: LAB | Facility: HOSPITAL | Age: 64
End: 2019-07-08

## 2019-07-08 ENCOUNTER — APPOINTMENT (OUTPATIENT)
Dept: LAB | Facility: HOSPITAL | Age: 64
End: 2019-07-08
Payer: COMMERCIAL

## 2019-07-08 DIAGNOSIS — M25.471 SWOLLEN ANKLES: ICD-10-CM

## 2019-07-08 DIAGNOSIS — M25.472 SWOLLEN ANKLES: Primary | ICD-10-CM

## 2019-07-08 DIAGNOSIS — M25.472 SWOLLEN ANKLES: ICD-10-CM

## 2019-07-08 DIAGNOSIS — M25.471 SWOLLEN ANKLES: Primary | ICD-10-CM

## 2019-07-08 LAB
ANION GAP SERPL CALCULATED.3IONS-SCNC: 4 MMOL/L (ref 4–13)
BASOPHILS # BLD AUTO: 0.04 THOUSANDS/ΜL (ref 0–0.1)
BASOPHILS NFR BLD AUTO: 1 % (ref 0–1)
BUN SERPL-MCNC: 22 MG/DL (ref 5–25)
CALCIUM SERPL-MCNC: 9 MG/DL (ref 8.3–10.1)
CHLORIDE SERPL-SCNC: 106 MMOL/L (ref 100–108)
CO2 SERPL-SCNC: 28 MMOL/L (ref 21–32)
CREAT SERPL-MCNC: 0.81 MG/DL (ref 0.6–1.3)
EOSINOPHIL # BLD AUTO: 0.57 THOUSAND/ΜL (ref 0–0.61)
EOSINOPHIL NFR BLD AUTO: 10 % (ref 0–6)
ERYTHROCYTE [DISTWIDTH] IN BLOOD BY AUTOMATED COUNT: 13 % (ref 11.6–15.1)
GFR SERPL CREATININE-BSD FRML MDRD: 96 ML/MIN/1.73SQ M
GLUCOSE P FAST SERPL-MCNC: 90 MG/DL (ref 65–99)
HCT VFR BLD AUTO: 42.4 % (ref 36.5–49.3)
HGB BLD-MCNC: 13.9 G/DL (ref 12–17)
IMM GRANULOCYTES # BLD AUTO: 0.02 THOUSAND/UL (ref 0–0.2)
IMM GRANULOCYTES NFR BLD AUTO: 0 % (ref 0–2)
INR PPP: 1.13 (ref 0.84–1.19)
LYMPHOCYTES # BLD AUTO: 1.38 THOUSANDS/ΜL (ref 0.6–4.47)
LYMPHOCYTES NFR BLD AUTO: 25 % (ref 14–44)
MCH RBC QN AUTO: 30.8 PG (ref 26.8–34.3)
MCHC RBC AUTO-ENTMCNC: 32.8 G/DL (ref 31.4–37.4)
MCV RBC AUTO: 94 FL (ref 82–98)
MONOCYTES # BLD AUTO: 0.45 THOUSAND/ΜL (ref 0.17–1.22)
MONOCYTES NFR BLD AUTO: 8 % (ref 4–12)
NEUTROPHILS # BLD AUTO: 3.09 THOUSANDS/ΜL (ref 1.85–7.62)
NEUTS SEG NFR BLD AUTO: 56 % (ref 43–75)
NRBC BLD AUTO-RTO: 0 /100 WBCS
PLATELET # BLD AUTO: 197 THOUSANDS/UL (ref 149–390)
PMV BLD AUTO: 11 FL (ref 8.9–12.7)
POTASSIUM SERPL-SCNC: 5 MMOL/L (ref 3.5–5.3)
PROTHROMBIN TIME: 14.1 SECONDS (ref 11.6–14.5)
RBC # BLD AUTO: 4.52 MILLION/UL (ref 3.88–5.62)
SODIUM SERPL-SCNC: 138 MMOL/L (ref 136–145)
WBC # BLD AUTO: 5.55 THOUSAND/UL (ref 4.31–10.16)

## 2019-07-08 PROCEDURE — 85610 PROTHROMBIN TIME: CPT

## 2019-07-08 PROCEDURE — 80048 BASIC METABOLIC PNL TOTAL CA: CPT

## 2019-07-08 PROCEDURE — 85025 COMPLETE CBC W/AUTO DIFF WBC: CPT

## 2019-07-08 PROCEDURE — 36415 COLL VENOUS BLD VENIPUNCTURE: CPT

## 2019-07-11 ENCOUNTER — OFFICE VISIT (OUTPATIENT)
Dept: CARDIOLOGY CLINIC | Facility: CLINIC | Age: 64
End: 2019-07-11
Payer: COMMERCIAL

## 2019-07-11 VITALS
DIASTOLIC BLOOD PRESSURE: 70 MMHG | HEART RATE: 65 BPM | BODY MASS INDEX: 25.29 KG/M2 | RESPIRATION RATE: 18 BRPM | SYSTOLIC BLOOD PRESSURE: 110 MMHG | HEIGHT: 74 IN

## 2019-07-11 DIAGNOSIS — I10 ESSENTIAL HYPERTENSION: Primary | ICD-10-CM

## 2019-07-11 DIAGNOSIS — Z01.810 PREOP CARDIOVASCULAR EXAM: ICD-10-CM

## 2019-07-11 DIAGNOSIS — I71.2 ANEURYSM OF ASCENDING AORTA (HCC): ICD-10-CM

## 2019-07-11 DIAGNOSIS — E78.5 HYPERLIPIDEMIA, UNSPECIFIED HYPERLIPIDEMIA TYPE: ICD-10-CM

## 2019-07-11 PROCEDURE — 3074F SYST BP LT 130 MM HG: CPT | Performed by: INTERNAL MEDICINE

## 2019-07-11 PROCEDURE — 93000 ELECTROCARDIOGRAM COMPLETE: CPT | Performed by: INTERNAL MEDICINE

## 2019-07-11 PROCEDURE — 99204 OFFICE O/P NEW MOD 45 MIN: CPT | Performed by: INTERNAL MEDICINE

## 2019-07-11 NOTE — ASSESSMENT & PLAN NOTE
The patient is scheduled for right ankle surgery  He has multiple risk factors including hyperlipidemia, hypertension, CVA and a dilated ascending aorta  I will be arranging for the patient to have a nuclear stress test   He had a recent echocardiogram showing adequate left ventricular functions with no significant valvular abnormalities

## 2019-07-11 NOTE — ASSESSMENT & PLAN NOTE
History of dilated ascending aorta  In addition the patient had history of CVA presumably on the basis of thrombus that may have migrated from the ascending aorta  He was on blood thinner in the past but currently is taking aspirin

## 2019-07-11 NOTE — PROGRESS NOTES
The patient had a nuclear stress test showing evidence of inferior perfusion abnormality consistent with either an old scar or diaphragmatic attenuation artifact  There was no evidence of reversible ischemia  The patient is asymptomatic  I discussed these findings with the patient and advised him that he is stable from the cardiac standpoint to proceed with surgery  Assessment/Plan:    Aneurysm of ascending aorta (HCC)  History of dilated ascending aorta  In addition the patient had history of CVA presumably on the basis of thrombus that may have migrated from the ascending aorta  He was on blood thinner in the past but currently is taking aspirin  Hypertension  History of hypertension, stable and adequately controlled  Hyperlipidemia  Hyperlipidemia, stable  The patient is on Zetia at 10 mg daily  Preop cardiovascular exam  The patient is scheduled for right ankle surgery  He has multiple risk factors including hyperlipidemia, hypertension, CVA and a dilated ascending aorta  I will be arranging for the patient to have a nuclear stress test   He had a recent echocardiogram showing adequate left ventricular functions with no significant valvular abnormalities  Diagnoses and all orders for this visit:    Essential hypertension    Aneurysm of ascending aorta (HCC)  -     POCT ECG  -     NM myocardial perfusion spect (rx stress and/or rest); Future    Hyperlipidemia, unspecified hyperlipidemia type    Preop cardiovascular exam  -     POCT ECG  -     NM myocardial perfusion spect (rx stress and/or rest); Future          Subjective:  Feels generally well but for ankle pain  Patient ID: Raul Nickerson is a 66 y o  male  The patient presented to this office for the purpose of cardiac evaluation and consultation in anticipation of ankle revision surgery  The patient has a history of hypertension and hyperlipidemia as well as seizure disorder    He denies any symptoms of chest pain, shortness of breath, palpitation, dizziness or lightheadedness  He has no significant leg edema  Patient had a history of the CVA presumably on the basis of a thrombus originating from the dilated ascending aorta  He has chronic pain  The following portions of the patient's history were reviewed and updated as appropriate: allergies, current medications, past family history, past medical history, past social history, past surgical history and problem list     Review of Systems   Respiratory: Negative for apnea, cough, chest tightness, shortness of breath and wheezing  Cardiovascular: Negative for chest pain, palpitations and leg swelling  Gastrointestinal: Negative for abdominal pain  Neurological: Negative for dizziness and light-headedness  Objective:  Stable cardiac-wise  /70 (BP Location: Right arm, Patient Position: Sitting)   Pulse 65   Resp 18   Ht 6' 2" (1 88 m)   BMI 25 29 kg/m²          Physical Exam   Constitutional: He is oriented to person, place, and time  He appears well-developed and well-nourished  No distress  HENT:   Head: Normocephalic  Eyes: Pupils are equal, round, and reactive to light  Neck: Normal range of motion  No JVD present  No thyromegaly present  Cardiovascular: Normal rate, regular rhythm, S1 normal and S2 normal  Exam reveals no gallop and no friction rub  Murmur heard  Crescendo systolic murmur is present with a grade of 1/6  Pulmonary/Chest: Effort normal and breath sounds normal  No respiratory distress  He has no wheezes  He has no rales  He exhibits no tenderness  Abdominal: Soft  Musculoskeletal: Normal range of motion  He exhibits no edema, tenderness or deformity  Neurological: He is alert and oriented to person, place, and time  Skin: Skin is warm and dry  He is not diaphoretic  Psychiatric: He has a normal mood and affect  Vitals reviewed

## 2019-07-11 NOTE — LETTER
July 11, 2019     Vona Harada, MD  [address] [facility] [address]    Patient: Tanya Lr   YOB: 1955   Date of Visit: 7/11/2019       Dear Dr Promise Jimenez: Thank you for referring Tanya Lr to me for evaluation  Below are my notes for this consultation  If you have questions, please do not hesitate to call me  I look forward to following your patient along with you  Sincerely,        Lisa Ewing MD        CC: YOVANI Duenas MD  7/11/2019 11:04 AM  Sign at close encounter  Assessment/Plan:    Aneurysm of ascending aorta ([address] )  History of dilated ascending aorta  In addition the patient had history of CVA presumably on the basis of thrombus that may have migrated from the ascending aorta  He was on blood thinner in the past but currently is taking aspirin  Hypertension  History of hypertension, stable and adequately controlled  Hyperlipidemia  Hyperlipidemia, stable  The patient is on Zetia at 10 mg daily  Preop cardiovascular exam  The patient is scheduled for right ankle surgery  He has multiple risk factors including hyperlipidemia, hypertension, CVA and a dilated ascending aorta  I will be arranging for the patient to have a nuclear stress test   He had a recent echocardiogram showing adequate left ventricular functions with no significant valvular abnormalities  Diagnoses and all orders for this visit:    Essential hypertension    Aneurysm of ascending aorta (HCC)  -     POCT ECG  -     NM myocardial perfusion spect (rx stress and/or rest); Future    Hyperlipidemia, unspecified hyperlipidemia type    Preop cardiovascular exam  -     POCT ECG  -     NM myocardial perfusion spect (rx stress and/or rest); Future          Subjective:  Feels generally well but for ankle pain  Patient ID: Tanya Lr is a 66 y o  male      The patient presented to this office for the purpose of cardiac evaluation and consultation in anticipation of ankle revision surgery  The patient has a history of hypertension and hyperlipidemia as well as seizure disorder  He denies any symptoms of chest pain, shortness of breath, palpitation, dizziness or lightheadedness  He has no significant leg edema  Patient had a history of the CVA presumably on the basis of a thrombus originating from the dilated ascending aorta  He has chronic pain  The following portions of the patient's history were reviewed and updated as appropriate: allergies, current medications, past family history, past medical history, past social history, past surgical history and problem list     Review of Systems   Respiratory: Negative for apnea, cough, chest tightness, shortness of breath and wheezing  Cardiovascular: Negative for chest pain, palpitations and leg swelling  Gastrointestinal: Negative for abdominal pain  Neurological: Negative for dizziness and light-headedness  Objective:  Stable cardiac-wise  /70 (BP Location: Right arm, Patient Position: Sitting)   Pulse 65   Resp 18   Ht 6' 2" (1 88 m)   BMI 25 29 kg/m²           Physical Exam   Constitutional: He is oriented to person, place, and time  He appears well-developed and well-nourished  No distress  HENT:   Head: Normocephalic  Eyes: Pupils are equal, round, and reactive to light  Neck: Normal range of motion  No JVD present  No thyromegaly present  Cardiovascular: Normal rate, regular rhythm, S1 normal and S2 normal  Exam reveals no gallop and no friction rub  Murmur heard  Crescendo systolic murmur is present with a grade of 1/6  Pulmonary/Chest: Effort normal and breath sounds normal  No respiratory distress  He has no wheezes  He has no rales  He exhibits no tenderness  Abdominal: Soft  Musculoskeletal: Normal range of motion  He exhibits no edema, tenderness or deformity  Neurological: He is alert and oriented to person, place, and time  Skin: Skin is warm and dry   He is not diaphoretic  Psychiatric: He has a normal mood and affect  Vitals reviewed

## 2019-07-15 ENCOUNTER — OFFICE VISIT (OUTPATIENT)
Dept: FAMILY MEDICINE CLINIC | Facility: CLINIC | Age: 64
End: 2019-07-15
Payer: COMMERCIAL

## 2019-07-15 VITALS
OXYGEN SATURATION: 96 % | TEMPERATURE: 97.2 F | DIASTOLIC BLOOD PRESSURE: 80 MMHG | HEART RATE: 78 BPM | RESPIRATION RATE: 16 BRPM | SYSTOLIC BLOOD PRESSURE: 120 MMHG

## 2019-07-15 DIAGNOSIS — Z79.899 MEDICAL MARIJUANA USE: ICD-10-CM

## 2019-07-15 DIAGNOSIS — M51.37 DDD (DEGENERATIVE DISC DISEASE), LUMBOSACRAL: Chronic | ICD-10-CM

## 2019-07-15 DIAGNOSIS — Z01.818 PREOP GENERAL PHYSICAL EXAM: Primary | ICD-10-CM

## 2019-07-15 DIAGNOSIS — G40.209 COMPLEX PARTIAL SEIZURE EVOLVING TO GENERALIZED SEIZURE (HCC): ICD-10-CM

## 2019-07-15 DIAGNOSIS — Z98.890 HISTORY OF FAILED ARTHROPLASTY OF ANKLE: ICD-10-CM

## 2019-07-15 DIAGNOSIS — I71.2 ANEURYSM OF ASCENDING AORTA (HCC): ICD-10-CM

## 2019-07-15 DIAGNOSIS — I10 ESSENTIAL HYPERTENSION: ICD-10-CM

## 2019-07-15 PROCEDURE — 99214 OFFICE O/P EST MOD 30 MIN: CPT | Performed by: FAMILY MEDICINE

## 2019-07-15 NOTE — PROGRESS NOTES
FAMILY PRACTICE OFFICE VISIT       NAME: Kavita Owens  AGE: 66 y o  SEX: male       : 1955        MRN: [id number]        Assessment and Plan     Problem List Items Addressed This Visit        Cardiovascular and Mediastinum    Hypertension     Well controlled on low-dose of lisinopril 2 5 mg daily         Aneurysm of ascending aorta (HCC)     Stable appearance of ascending thoracic aortic aneurysm of 41 millimeters on CT chest in 2018  Echocardiogram May 2019, ejection fraction 60%  The aortic root diameter was 40 mm  Nervous and Auditory    Complex partial seizure evolving to generalized seizure [facility])     Patient remains under care of [facility], on Keppra, most recent office visit in May of 2019         Relevant Medications    CANNABIDIOL PO       Musculoskeletal and Integument    DDD (degenerative disc disease), lumbosacral (Chronic)       Other    Medical marijuana use    History of failed arthroplasty of ankle      Other Visit Diagnoses     Preop general physical exam    -  Primary        Patient presents for preop evaluation prior to revision of right total ankle replacement on   He denies symptoms of chest pain, palpitations, shortness of breath or dizziness  He remains on medications for hypertension, hyperlipidemia, seizure disorder  Patient will be proceeding with nuclear stress test on  as per recommendation of Cardiology  Patient will be acceptable risk for planned surgical procedure as long as cleared by Cardiology pending results of nuclear stress test   Patient should hold aspirin and meloxicam 5-7 days preop  Please contact patient's Neurology for any specific recommendations RE : Keppra therapy in the perioperative period  There are no Patient Instructions on file for this visit  Discussed with the patient and all questioned fully answered  He will call me if any problems arise  M*Modal software was used to dictate this note   It may contain errors with dictating incorrect words/spelling  Please contact provider directly with any questions  Chief Complaint     Chief Complaint   Patient presents with    Pre-op Exam       History of Present Illness      Right  TAR x approximate 5-7 years ago-  Gayle Carlos     pending right  revisional  TAR  And STJ fusion scheduled for July 20 at [facility] [address]     Persistent right ankle pain  Patient is wearing boot, on crutches  He presents for preop evaluation  Patient was seen by [facility] few days ago  He remains on medications for hypertension and hyperlipidemia  Patient denies symptoms of chest pain, palpitations, shortness of breath or dizziness  Due to multiple risk factors patient is scheduled for nuclear stress test on July 17th  Current medications include lisinopril, Zetia, Keppra, aspirin, gabapentin and meloxicam    Recent CBC and BMP are normal           Review of Systems   Review of Systems   Constitutional: Negative  HENT: Negative  Eyes: Negative  Respiratory: Negative  Gastrointestinal: Negative  Endocrine: Negative  Genitourinary: Negative  Musculoskeletal: Positive for arthralgias, back pain and gait problem  Skin: Negative  Allergic/Immunologic: Negative  Neurological: Negative for dizziness and headaches  History of seizure disorder, no recurrences, patient remains on Keppra   Hematological: Negative  Psychiatric/Behavioral: Negative          Active Problem List     Patient Active Problem List   Diagnosis    DDD (degenerative disc disease), cervical    Complex partial seizure evolving to generalized seizure ([facility] [address] )    Chronic pain disorder    DDD (degenerative disc disease), lumbosacral    Hyperlipidemia    Hypertension    Aneurysm of ascending aorta (HCC)    Numbness and tingling in both hands    Polyneuropathy    Increased prostate specific antigen (PSA) velocity    Preop cardiovascular exam    Medical marijuana use    H/O total ankle replacement, right    History of failed arthroplasty of ankle       Past Medical History:  Past Medical History:   Diagnosis Date    Aneurysm, ascending aorta ([facility] [address] )     Anxiety disorder     Arthritis     Last assessed: 11/17/16    Asthma     DDD (degenerative disc disease), cervical     Depression     Hyperlipidemia     Hypertension     Seizures ([facility] [address] )     Stroke syndrome        Past Surgical History:  Past Surgical History:   Procedure Laterality Date    ANKLE SURGERY      COLONOSCOPY  2009    Due 2014    HERNIA REPAIR      ROOT CANAL      TOTAL HIP ARTHROPLASTY Left 02/2014    TOTAL HIP ARTHROPLASTY Right        Family History:  Family History   Problem Relation Age of Onset    Anxiety disorder Mother         Symptom/disorder    Hypertension Mother         Benign Essential    Mitral valve prolapse Mother         Echo/Systolic    PABLO disease Mother     Fibromyalgia Mother     Hyperlipidemia Mother     Diabetes Father         Mellitus    Cancer Brother     Stroke Family         CVA    Cancer Family     Sudden death Family         Instantaneous Cardiac Death    Other Family         Connective Tissue Defect       Social History:  Social History     Socioeconomic History    Marital status:      Spouse name: Not on file    Number of children: Not on file    Years of education: Bachelor's Degree    Highest education level: Not on file   Occupational History    Not on file   Social Needs    Financial resource strain: Not on file    Food insecurity:     Worry: Not on file     Inability: Not on file    Transportation needs:     Medical: Not on file     Non-medical: Not on file   Tobacco Use    Smoking status: Never Smoker    Smokeless tobacco: Never Used   Substance and Sexual Activity    Alcohol use: Yes     Comment: Occasionally    Drug use: Yes     Types: Marijuana     Comment: medical marijuana    Sexual activity: Not on file   Lifestyle  Physical activity:     Days per week: Not on file     Minutes per session: Not on file    Stress: Not on file   Relationships    Social connections:     Talks on phone: Not on file     Gets together: Not on file     Attends Sikhism service: Not on file     Active member of club or organization: Not on file     Attends meetings of clubs or organizations: Not on file     Relationship status: Not on file    Intimate partner violence:     Fear of current or ex partner: Not on file     Emotionally abused: Not on file     Physically abused: Not on file     Forced sexual activity: Not on file   Other Topics Concern    Not on file   Social History Narrative    Daily coffee consumption: 7 cups/day    Per [employer]: Currently        Objective     Vitals:    07/15/19 1438 07/15/19 1509   BP: 116/88 120/80   BP Location: Left arm    Patient Position: Sitting    Cuff Size: Large    Pulse: 78    Resp: 16    Temp: (!) 97 2 °F (36 2 °C)    TempSrc: Tympanic    SpO2: 96%      Wt Readings from Last 3 Encounters:   06/18/19 89 4 kg (197 lb)   05/02/19 89 4 kg (197 lb)   04/22/19 89 5 kg (197 lb 6 4 oz)       Physical Exam   Constitutional: He is oriented to person, place, and time  He appears well-developed and well-nourished  HENT:   Head: Normocephalic and atraumatic  Eyes: Conjunctivae are normal    Neck: Neck supple  Carotid bruit is not present  Cardiovascular: Normal rate, regular rhythm and normal heart sounds  No murmur heard  Pulmonary/Chest: Effort normal and breath sounds normal  No respiratory distress  He has no wheezes  He has no rales  Abdominal: Bowel sounds are normal  He exhibits no distension and no abdominal bruit  Musculoskeletal: He exhibits no edema  Patient is wearing knee-high right orthotic boot  He is ambulating with crutches   Neurological: He is alert and oriented to person, place, and time  No cranial nerve deficit  Psychiatric: He has a normal mood and affect   His behavior is normal    Nursing note and vitals reviewed        Pertinent Laboratory/Diagnostic Studies:  Lab Results   Component Value Date    GLUCOSE 83 12/18/2015    BUN 22 07/08/2019    CREATININE 0 81 07/08/2019    CALCIUM 9 0 07/08/2019     12/18/2015    K 5 0 07/08/2019    CO2 28 07/08/2019     07/08/2019     Lab Results   Component Value Date    ALT 17 04/22/2019    AST 17 04/22/2019    ALKPHOS 59 04/22/2019    BILITOT 0 54 12/18/2015       Lab Results   Component Value Date    WBC 5 55 07/08/2019    HGB 13 9 07/08/2019    HCT 42 4 07/08/2019    MCV 94 07/08/2019     07/08/2019       No results found for: TSH    Lab Results   Component Value Date    CHOL 200 12/18/2015     Lab Results   Component Value Date    TRIG 87 04/22/2019     Lab Results   Component Value Date    HDL 39 (L) 04/22/2019     Lab Results   Component Value Date    LDLCALC 104 (H) 04/22/2019     Lab Results   Component Value Date    HGBA1C 4 9 03/18/2014       Results for orders placed or performed in visit on 86/07/80   Basic metabolic panel   Result Value Ref Range    Sodium 138 136 - 145 mmol/L    Potassium 5 0 3 5 - 5 3 mmol/L    Chloride 106 100 - 108 mmol/L    CO2 28 21 - 32 mmol/L    ANION GAP 4 4 - 13 mmol/L    BUN 22 5 - 25 mg/dL    Creatinine 0 81 0 60 - 1 30 mg/dL    Glucose, Fasting 90 65 - 99 mg/dL    Calcium 9 0 8 3 - 10 1 mg/dL    eGFR 96 ml/min/1 73sq m   CBC and differential   Result Value Ref Range    WBC 5 55 4 31 - 10 16 Thousand/uL    RBC 4 52 3 88 - 5 62 Million/uL    Hemoglobin 13 9 12 0 - 17 0 g/dL    Hematocrit 42 4 36 5 - 49 3 %    MCV 94 82 - 98 fL    MCH 30 8 26 8 - 34 3 pg    MCHC 32 8 31 4 - 37 4 g/dL    RDW 13 0 11 6 - 15 1 %    MPV 11 0 8 9 - 12 7 fL    Platelets 653 206 - 984 Thousands/uL    nRBC 0 /100 WBCs    Neutrophils Relative 56 43 - 75 %    Immat GRANS % 0 0 - 2 %    Lymphocytes Relative 25 14 - 44 %    Monocytes Relative 8 4 - 12 %    Eosinophils Relative 10 (H) 0 - 6 %    Basophils Relative 1 0 - 1 %    Neutrophils Absolute 3 09 1 85 - 7 62 Thousands/µL    Immature Grans Absolute 0 02 0 00 - 0 20 Thousand/uL    Lymphocytes Absolute 1 38 0 60 - 4 47 Thousands/µL    Monocytes Absolute 0 45 0 17 - 1 22 Thousand/µL    Eosinophils Absolute 0 57 0 00 - 0 61 Thousand/µL    Basophils Absolute 0 04 0 00 - 0 10 Thousands/µL   Protime-INR   Result Value Ref Range    Protime 14 1 11 6 - 14 5 seconds    INR 1 13 0 84 - 1 19       No orders of the defined types were placed in this encounter  ALLERGIES:  Allergies   Allergen Reactions    Gluten Meal Other (See Comments)     UNKNOWN       Current Medications     Current Outpatient Medications   Medication Sig Dispense Refill    amoxicillin (AMOXIL) 500 MG tablet Take 4 tablets 1 hour prior to dental procedure 20 tablet 1    aspirin 81 MG tablet Take 81 mg by mouth daily   calcium-vitamin D (OSCAL 500 + D) 500 mg-200 units per tablet Take 1 tablet by mouth daily   CANNABIDIOL PO Take by mouth      Cholecalciferol (VITAMIN D3) 2000 units TABS Take 2,000 Units by mouth daily      ezetimibe (ZETIA) 10 mg tablet TAKE ONE TABLET BY MOUTH EVERY DAY 30 tablet 5    gabapentin (NEURONTIN) 300 mg capsule TAKE ONE CAPSULE BY MOUTH EVERY MORNING, ONE CAPSULE AT MIDDAY, TAKE TWO CAPSULES BY MOUTH AT BEDTIME 120 capsule 3    levETIRAcetam (KEPPRA) 500 mg tablet Take 2 tablets (1,000 mg total) by mouth every 12 (twelve) hours 360 tablet 3    lisinopril (ZESTRIL) 2 5 mg tablet Take 1 tablet (2 5 mg total) by mouth daily 90 tablet 2    meloxicam (MOBIC) 7 5 mg tablet TAKE ONE TABLET BY MOUTH TWICE A DAY AFTER MEALS AS NEEDED FOR JOINT PAIN 60 tablet 3    Omega-3 Fatty Acids (FISH OIL) 1200 MG CAPS Take by mouth daily       No current facility-administered medications for this visit  There are no discontinued medications      Health Maintenance     Health Maintenance   Topic Date Due    Hepatitis C Screening  1955    SLP PLAN OF CARE 1955    BMI: Followup Plan  07/22/1975    INFLUENZA VACCINE  04/18/2020 (Originally 7/1/2019)    DTaP,Tdap,and Td Vaccines (1 - Tdap) 04/18/2020 (Originally 7/22/1978)    Depression Screening PHQ  07/19/2019    Medicare Annual Wellness Visit (AWV)  07/19/2019    CRC Screening: Colonoscopy  10/17/2019    CRC Screening: FOBTx3/FIT  12/17/2019    BMI: Adult  06/18/2020    Pneumococcal Vaccine: 65+ Years (1 of 2 - PCV13) 07/22/2022    Pneumococcal Vaccine: Pediatrics (0 to 5 Years) and At-Risk Patients (6 to 59 Years)  Aged Out    HEPATITIS B VACCINES  Aged [employer] History   Administered Date(s) Administered    Influenza TIV (IM) 10/09/2014, 09/16/2015       Luis Jara MD

## 2019-07-16 ENCOUNTER — TELEPHONE (OUTPATIENT)
Dept: FAMILY MEDICINE CLINIC | Facility: CLINIC | Age: 64
End: 2019-07-16

## 2019-07-16 NOTE — ASSESSMENT & PLAN NOTE
Patient remains under care of [facility]'s Neurology, on Primus Fass, most recent office visit in May of 2019

## 2019-07-16 NOTE — ASSESSMENT & PLAN NOTE
Stable appearance of ascending thoracic aortic aneurysm of 41 millimeters on CT chest in April of 2018  Echocardiogram May 2019, ejection fraction 60%  The aortic root diameter was 40 mm

## 2019-07-16 NOTE — TELEPHONE ENCOUNTER
Please contact patient  I will be forwarding my preop exam to his orthopedic surgeon this morning  I wanted to make sure he was advised to hold aspirin and meloxicam prior to surgery  Final clearance will be giving pending results of nuclear stress test tomorrow      Thank you

## 2019-07-17 ENCOUNTER — HOSPITAL ENCOUNTER (OUTPATIENT)
Dept: NON INVASIVE DIAGNOSTICS | Facility: CLINIC | Age: 64
Discharge: HOME | End: 2019-07-17
Payer: COMMERCIAL

## 2019-07-17 DIAGNOSIS — I71.2 ANEURYSM OF ASCENDING AORTA (HCC): ICD-10-CM

## 2019-07-17 DIAGNOSIS — Z01.810 PREOP CARDIOVASCULAR EXAM: ICD-10-CM

## 2019-07-17 LAB
CHEST PAIN STATEMENT: NORMAL
MAX DIASTOLIC BP: 80 MMHG
MAX HEART RATE: 93 BPM
MAX PREDICTED HEART RATE: 159 BPM
MAX. SYSTOLIC BP: 132 MMHG
PROTOCOL NAME: NORMAL
REASON FOR TERMINATION: NORMAL
TARGET HR FORMULA: NORMAL
TEST INDICATION: NORMAL
TIME IN EXERCISE PHASE: NORMAL

## 2019-07-17 PROCEDURE — 93017 CV STRESS TEST TRACING ONLY: CPT

## 2019-07-17 PROCEDURE — A9502 TC99M TETROFOSMIN: HCPCS

## 2019-07-17 PROCEDURE — 93018 CV STRESS TEST I&R ONLY: CPT | Performed by: INTERNAL MEDICINE

## 2019-07-17 PROCEDURE — 78452 HT MUSCLE IMAGE SPECT MULT: CPT

## 2019-07-17 PROCEDURE — 78452 HT MUSCLE IMAGE SPECT MULT: CPT | Performed by: INTERNAL MEDICINE

## 2019-07-17 PROCEDURE — 93016 CV STRESS TEST SUPVJ ONLY: CPT | Performed by: INTERNAL MEDICINE

## 2019-07-17 RX ADMIN — REGADENOSON 0.4 MG: 0.08 INJECTION, SOLUTION INTRAVENOUS at 08:55

## 2019-07-18 ENCOUNTER — TELEPHONE (OUTPATIENT)
Dept: CARDIOLOGY CLINIC | Facility: CLINIC | Age: 64
End: 2019-07-18

## 2019-07-18 NOTE — TELEPHONE ENCOUNTER
Please review stress test and advise if cleared for surgery- There is a form on your desk as well   Thank you

## 2019-07-18 NOTE — TELEPHONE ENCOUNTER
Discussed findings of nuclear stress test   The old scar versus diaphragmatic attenuation  No reversible ischemia  The patient is asymptomatic  He is stable cardiac-wise to proceed with surgery

## 2019-07-22 DIAGNOSIS — Z71.89 COMPLEX CARE COORDINATION: Primary | ICD-10-CM

## 2019-09-04 DIAGNOSIS — M50.30 DDD (DEGENERATIVE DISC DISEASE), CERVICAL: ICD-10-CM

## 2019-09-04 RX ORDER — GABAPENTIN 300 MG/1
CAPSULE ORAL
Qty: 120 CAPSULE | Refills: 3 | Status: SHIPPED | OUTPATIENT
Start: 2019-09-04 | End: 2020-01-24

## 2019-10-03 DIAGNOSIS — M51.36 DDD (DEGENERATIVE DISC DISEASE), LUMBAR: ICD-10-CM

## 2019-10-03 RX ORDER — MELOXICAM 7.5 MG/1
TABLET ORAL
Qty: 60 TABLET | Refills: 3 | Status: SHIPPED | OUTPATIENT
Start: 2019-10-03 | End: 2020-03-14

## 2019-10-14 RX ORDER — IBUPROFEN 800 MG/1
TABLET ORAL
COMMUNITY
Start: 2019-07-20 | End: 2020-03-14

## 2019-10-14 RX ORDER — LISINOPRIL 5 MG/1
TABLET ORAL
COMMUNITY
Start: 2019-07-28 | End: 2019-11-08

## 2019-10-14 RX ORDER — OXYCODONE HYDROCHLORIDE AND ACETAMINOPHEN 5; 325 MG/1; MG/1
TABLET ORAL
COMMUNITY
Start: 2019-07-20 | End: 2020-11-11 | Stop reason: ALTCHOICE

## 2019-10-17 ENCOUNTER — OFFICE VISIT (OUTPATIENT)
Dept: CARDIOLOGY CLINIC | Facility: CLINIC | Age: 64
End: 2019-10-17
Payer: COMMERCIAL

## 2019-10-17 VITALS
WEIGHT: 202.3 LBS | DIASTOLIC BLOOD PRESSURE: 82 MMHG | BODY MASS INDEX: 25.96 KG/M2 | HEIGHT: 74 IN | HEART RATE: 76 BPM | SYSTOLIC BLOOD PRESSURE: 120 MMHG

## 2019-10-17 DIAGNOSIS — E78.5 HYPERLIPIDEMIA, UNSPECIFIED HYPERLIPIDEMIA TYPE: Primary | ICD-10-CM

## 2019-10-17 DIAGNOSIS — I10 ESSENTIAL HYPERTENSION: ICD-10-CM

## 2019-10-17 DIAGNOSIS — I71.2 ANEURYSM OF ASCENDING AORTA (HCC): ICD-10-CM

## 2019-10-17 PROBLEM — Z01.810 PREOP CARDIOVASCULAR EXAM: Status: RESOLVED | Noted: 2019-02-13 | Resolved: 2019-10-17

## 2019-10-17 PROCEDURE — 3074F SYST BP LT 130 MM HG: CPT | Performed by: INTERNAL MEDICINE

## 2019-10-17 PROCEDURE — 99214 OFFICE O/P EST MOD 30 MIN: CPT | Performed by: INTERNAL MEDICINE

## 2019-10-17 PROCEDURE — 3079F DIAST BP 80-89 MM HG: CPT | Performed by: INTERNAL MEDICINE

## 2019-10-17 NOTE — PROGRESS NOTES
[facility]  Follow up note  Rashaad Mobley 60 y o  male MRN: [id number]        Problems    1  Hyperlipidemia, unspecified hyperlipidemia type     2  Aneurysm of ascending aorta (HCC)     3  Essential hypertension         Impression:    Dena Hope had a small stroke remotely, PFO found on workup, given aspirin since that time and no recurrence  Recently in for a preoperative evaluation for ankle replacement, was told that he had a prior inferior wall myocardial infarction, but has never had 1 by history and none by EKG  I reviewed his stress test personally, shows inferior artifact, his echo done just 2 months prior to the stress test shows completely normal wall motion again making the likelihood of artifact high  He offers no significant symptoms at this time   Hypertension-well controlled   Hyperlipidemia-well-controlled   History of minimal ascending aortic aneurysm between 40 and 43 mm over the last 6-7 years, recently 40 mm by echo, no significant change    Plan:     As needed follow-up with me in the future   Considering the borderline large ascending aorta and no change in 6-7 years, no annual follow-up by imaging necessary      HPI:   Rashaad Mobley is a 60y o  year old male with a history of PFO, CVA, hypertension, hyperlipidemia, borderline ascending aortic aneurysm presents for a follow-up visit to have a 2nd opinion on his recent stress test, suggested to have an inferior infarct  He has not had anything by history, denies chest pain, denies shortness of breath, denies palpitations, he underwent his ankle replacement without complications  I reviewed his stress tests within personally today, it does appear to be consistent with infarct, and his echo just 2 months prior was normal   EKG was normal, no sign of inferior Q-waves  Review of Systems   Constitutional: Negative for appetite change, diaphoresis, fatigue and fever     Respiratory: Negative for chest tightness, shortness of breath and wheezing  Cardiovascular: Negative for chest pain, palpitations and leg swelling  Gastrointestinal: Negative for abdominal pain and blood in stool  Musculoskeletal: Positive for arthralgias and gait problem  Negative for joint swelling  Skin: Negative for rash  Neurological: Negative for dizziness, syncope and light-headedness  Past Medical History:   Diagnosis Date    Aneurysm, ascending aorta (HCC)     Anxiety disorder     Arthritis     Last assessed: 11/17/16    Asthma     DDD (degenerative disc disease), cervical     Depression     Hyperlipidemia     Hypertension     Seizures (HCC)     Stroke syndrome      Social History     Substance and Sexual Activity   Alcohol Use Yes    Comment: Occasionally     Social History     Substance and Sexual Activity   Drug Use Yes    Types: Marijuana    Comment: medical marijuana     Social History     Tobacco Use   Smoking Status Never Smoker   Smokeless Tobacco Never Used       Allergies: Allergies   Allergen Reactions    Gluten Meal Other (See Comments)     UNKNOWN       Medications:     Current Outpatient Medications:     aspirin 81 MG tablet, Take 81 mg by mouth daily  , Disp: , Rfl:     Cholecalciferol (VITAMIN D3) 2000 units TABS, Take 2,000 Units by mouth daily, Disp: , Rfl:     ezetimibe (ZETIA) 10 mg tablet, TAKE ONE TABLET BY MOUTH EVERY DAY, Disp: 30 tablet, Rfl: 5    gabapentin (NEURONTIN) 300 mg capsule, TAKE ONE CAPSULE BY MOUTH EVERY MORNING, ONE CAPSULE AT MIDDAY, TAKE TWO CAPSULES BY MOUTH AT BEDTIME, Disp: 120 capsule, Rfl: 3    levETIRAcetam (KEPPRA) 500 mg tablet, Take 2 tablets (1,000 mg total) by mouth every 12 (twelve) hours, Disp: 360 tablet, Rfl: 3    lisinopril (ZESTRIL) 2 5 mg tablet, Take 1 tablet (2 5 mg total) by mouth daily, Disp: 90 tablet, Rfl: 2    meloxicam (MOBIC) 7 5 mg tablet, TAKE ONE TABLET BY MOUTH TWICE A DAY AFTER MEALS AS NEEDED FOR JOINT PAIN, Disp: 60 tablet, Rfl: 3    Omega-3 Fatty Acids (FISH OIL) 1200 MG CAPS, Take by mouth daily, Disp: , Rfl:     amoxicillin (AMOXIL) 500 MG tablet, Take 4 tablets 1 hour prior to dental procedure (Patient not taking: Reported on 10/17/2019), Disp: 20 tablet, Rfl: 1    calcium-vitamin D (OSCAL 500 + D) 500 mg-200 units per tablet, Take 1 tablet by mouth daily  , Disp: , Rfl:     CANNABIDIOL PO, Take by mouth, Disp: , Rfl:     ibuprofen (MOTRIN) 800 mg tablet, , Disp: , Rfl:     lisinopril (ZESTRIL) 5 mg tablet, , Disp: , Rfl:     oxyCODONE-acetaminophen (PERCOCET) 5-325 mg per tablet, , Disp: , Rfl:       Vitals:    10/17/19 1128   BP: 120/82   Pulse: 76     Weight (last 2 days)     Date/Time   Weight    10/17/19 1128   91 8 (202 3)            Physical Exam   Constitutional: He is oriented to person, place, and time  No distress  HENT:   Head: Normocephalic and atraumatic  Eyes: Conjunctivae are normal  No scleral icterus  Neck: Normal range of motion  No JVD present  Cardiovascular: Normal rate, regular rhythm, normal heart sounds and intact distal pulses  No murmur heard  Pulmonary/Chest: Effort normal and breath sounds normal  No respiratory distress  He has no decreased breath sounds  He has no wheezes  He has no rhonchi  He has no rales  Musculoskeletal: He exhibits no edema  Right lower leg: Normal  He exhibits no edema  Left lower leg: Normal  He exhibits no edema  Neurological: He is alert and oriented to person, place, and time  Skin: Skin is warm and dry  He is not diaphoretic  Laboratory Studies:    Laboratory studies personally reviewed    Cardiac testing:     EKG reviewed personally:  Normal sinus rhythm, normal EKG  Stress Myoview 7/19-EF normal, inferior artifact  Echocardiogram 5/19-EF normal      Mary Kay Aleman MD    Portions of the record may have been created with voice recognition software    Occasional wrong word or "sound a like" substitutions may have occurred due to the inherent limitations of voice recognition software  Read the chart carefully and recognize, using context, where substitutions have occurred

## 2019-10-29 DIAGNOSIS — I10 ESSENTIAL HYPERTENSION: ICD-10-CM

## 2019-10-30 RX ORDER — LISINOPRIL 5 MG/1
TABLET ORAL
Qty: 90 TABLET | Refills: 1 | OUTPATIENT
Start: 2019-10-30

## 2019-11-07 DIAGNOSIS — I10 ESSENTIAL HYPERTENSION: ICD-10-CM

## 2019-11-08 RX ORDER — LISINOPRIL 2.5 MG/1
2.5 TABLET ORAL DAILY
Qty: 90 TABLET | Refills: 1 | Status: SHIPPED | OUTPATIENT
Start: 2019-11-08 | End: 2020-05-22

## 2019-11-13 RX ORDER — CLINDAMYCIN HYDROCHLORIDE 300 MG/1
CAPSULE ORAL
Refills: 1 | COMMUNITY
Start: 2019-10-29 | End: 2019-11-14 | Stop reason: SDUPTHER

## 2019-11-14 ENCOUNTER — APPOINTMENT (OUTPATIENT)
Dept: LAB | Facility: CLINIC | Age: 64
End: 2019-11-14
Payer: COMMERCIAL

## 2019-11-14 ENCOUNTER — OFFICE VISIT (OUTPATIENT)
Dept: FAMILY MEDICINE CLINIC | Facility: CLINIC | Age: 64
End: 2019-11-14
Payer: COMMERCIAL

## 2019-11-14 VITALS
RESPIRATION RATE: 18 BRPM | HEIGHT: 74 IN | BODY MASS INDEX: 26.64 KG/M2 | OXYGEN SATURATION: 99 % | TEMPERATURE: 98.6 F | HEART RATE: 62 BPM | DIASTOLIC BLOOD PRESSURE: 76 MMHG | SYSTOLIC BLOOD PRESSURE: 122 MMHG | WEIGHT: 207.6 LBS

## 2019-11-14 DIAGNOSIS — G89.4 CHRONIC PAIN DISORDER: ICD-10-CM

## 2019-11-14 DIAGNOSIS — I10 ESSENTIAL HYPERTENSION: Primary | ICD-10-CM

## 2019-11-14 DIAGNOSIS — Z12.5 ENCOUNTER FOR PROSTATE CANCER SCREENING: ICD-10-CM

## 2019-11-14 DIAGNOSIS — M54.9 MID BACK PAIN ON RIGHT SIDE: ICD-10-CM

## 2019-11-14 DIAGNOSIS — I10 ESSENTIAL HYPERTENSION: ICD-10-CM

## 2019-11-14 DIAGNOSIS — E78.5 HYPERLIPIDEMIA, UNSPECIFIED HYPERLIPIDEMIA TYPE: ICD-10-CM

## 2019-11-14 DIAGNOSIS — Z11.59 NEED FOR HEPATITIS C SCREENING TEST: ICD-10-CM

## 2019-11-14 DIAGNOSIS — Z12.11 SCREENING FOR COLON CANCER: ICD-10-CM

## 2019-11-14 DIAGNOSIS — Z96.661 H/O TOTAL ANKLE REPLACEMENT, RIGHT: ICD-10-CM

## 2019-11-14 DIAGNOSIS — G40.209 COMPLEX PARTIAL SEIZURE EVOLVING TO GENERALIZED SEIZURE (HCC): ICD-10-CM

## 2019-11-14 DIAGNOSIS — Z00.00 ENCOUNTER FOR MEDICARE ANNUAL WELLNESS EXAM: ICD-10-CM

## 2019-11-14 DIAGNOSIS — I71.2 ANEURYSM OF ASCENDING AORTA (HCC): ICD-10-CM

## 2019-11-14 LAB
ALBUMIN SERPL BCP-MCNC: 4.4 G/DL (ref 3.5–5)
ALP SERPL-CCNC: 77 U/L (ref 46–116)
ALT SERPL W P-5'-P-CCNC: 16 U/L (ref 12–78)
ANION GAP SERPL CALCULATED.3IONS-SCNC: 6 MMOL/L (ref 4–13)
AST SERPL W P-5'-P-CCNC: 13 U/L (ref 5–45)
BACTERIA UR QL AUTO: NORMAL /HPF
BASOPHILS # BLD AUTO: 0.06 THOUSANDS/ΜL (ref 0–0.1)
BASOPHILS NFR BLD AUTO: 1 % (ref 0–1)
BILIRUB SERPL-MCNC: 0.43 MG/DL (ref 0.2–1)
BILIRUB UR QL STRIP: NEGATIVE
BUN SERPL-MCNC: 19 MG/DL (ref 5–25)
CALCIUM SERPL-MCNC: 8.8 MG/DL (ref 8.3–10.1)
CHLORIDE SERPL-SCNC: 106 MMOL/L (ref 100–108)
CHOLEST SERPL-MCNC: 154 MG/DL (ref 50–200)
CLARITY UR: CLEAR
CO2 SERPL-SCNC: 27 MMOL/L (ref 21–32)
COLOR UR: YELLOW
CREAT SERPL-MCNC: 0.82 MG/DL (ref 0.6–1.3)
EOSINOPHIL # BLD AUTO: 0.12 THOUSAND/ΜL (ref 0–0.61)
EOSINOPHIL NFR BLD AUTO: 2 % (ref 0–6)
ERYTHROCYTE [DISTWIDTH] IN BLOOD BY AUTOMATED COUNT: 12.5 % (ref 11.6–15.1)
GFR SERPL CREATININE-BSD FRML MDRD: 95 ML/MIN/1.73SQ M
GLUCOSE P FAST SERPL-MCNC: 89 MG/DL (ref 65–99)
GLUCOSE UR STRIP-MCNC: NEGATIVE MG/DL
HCT VFR BLD AUTO: 40.4 % (ref 36.5–49.3)
HCV AB SER QL: NORMAL
HDLC SERPL-MCNC: 35 MG/DL
HGB BLD-MCNC: 12.6 G/DL (ref 12–17)
HGB UR QL STRIP.AUTO: NEGATIVE
IMM GRANULOCYTES # BLD AUTO: 0.01 THOUSAND/UL (ref 0–0.2)
IMM GRANULOCYTES NFR BLD AUTO: 0 % (ref 0–2)
KETONES UR STRIP-MCNC: NEGATIVE MG/DL
LDLC SERPL CALC-MCNC: 105 MG/DL (ref 0–100)
LEUKOCYTE ESTERASE UR QL STRIP: NEGATIVE
LYMPHOCYTES # BLD AUTO: 1.6 THOUSANDS/ΜL (ref 0.6–4.47)
LYMPHOCYTES NFR BLD AUTO: 30 % (ref 14–44)
MCH RBC QN AUTO: 29.2 PG (ref 26.8–34.3)
MCHC RBC AUTO-ENTMCNC: 31.2 G/DL (ref 31.4–37.4)
MCV RBC AUTO: 94 FL (ref 82–98)
MONOCYTES # BLD AUTO: 0.45 THOUSAND/ΜL (ref 0.17–1.22)
MONOCYTES NFR BLD AUTO: 8 % (ref 4–12)
NEUTROPHILS # BLD AUTO: 3.11 THOUSANDS/ΜL (ref 1.85–7.62)
NEUTS SEG NFR BLD AUTO: 59 % (ref 43–75)
NITRITE UR QL STRIP: NEGATIVE
NON-SQ EPI CELLS URNS QL MICRO: NORMAL /HPF
NRBC BLD AUTO-RTO: 0 /100 WBCS
PH UR STRIP.AUTO: 6.5 [PH]
PLATELET # BLD AUTO: 250 THOUSANDS/UL (ref 149–390)
PMV BLD AUTO: 11.1 FL (ref 8.9–12.7)
POTASSIUM SERPL-SCNC: 4.2 MMOL/L (ref 3.5–5.3)
PROT SERPL-MCNC: 7.4 G/DL (ref 6.4–8.2)
PROT UR STRIP-MCNC: NEGATIVE MG/DL
PSA SERPL-MCNC: 1.9 NG/ML (ref 0–4)
RBC # BLD AUTO: 4.31 MILLION/UL (ref 3.88–5.62)
RBC #/AREA URNS AUTO: NORMAL /HPF
SODIUM SERPL-SCNC: 139 MMOL/L (ref 136–145)
SP GR UR STRIP.AUTO: 1.01 (ref 1–1.03)
TRIGL SERPL-MCNC: 71 MG/DL
TSH SERPL DL<=0.05 MIU/L-ACNC: 3.68 UIU/ML (ref 0.36–3.74)
UROBILINOGEN UR QL STRIP.AUTO: 0.2 E.U./DL
WBC # BLD AUTO: 5.35 THOUSAND/UL (ref 4.31–10.16)
WBC #/AREA URNS AUTO: NORMAL /HPF

## 2019-11-14 PROCEDURE — G0103 PSA SCREENING: HCPCS

## 2019-11-14 PROCEDURE — 87086 URINE CULTURE/COLONY COUNT: CPT | Performed by: FAMILY MEDICINE

## 2019-11-14 PROCEDURE — 80061 LIPID PANEL: CPT

## 2019-11-14 PROCEDURE — 3725F SCREEN DEPRESSION PERFORMED: CPT | Performed by: FAMILY MEDICINE

## 2019-11-14 PROCEDURE — 86803 HEPATITIS C AB TEST: CPT

## 2019-11-14 PROCEDURE — G0439 PPPS, SUBSEQ VISIT: HCPCS | Performed by: FAMILY MEDICINE

## 2019-11-14 PROCEDURE — 85025 COMPLETE CBC W/AUTO DIFF WBC: CPT

## 2019-11-14 PROCEDURE — 81001 URINALYSIS AUTO W/SCOPE: CPT | Performed by: FAMILY MEDICINE

## 2019-11-14 PROCEDURE — 99214 OFFICE O/P EST MOD 30 MIN: CPT | Performed by: FAMILY MEDICINE

## 2019-11-14 PROCEDURE — 84443 ASSAY THYROID STIM HORMONE: CPT

## 2019-11-14 PROCEDURE — 80053 COMPREHEN METABOLIC PANEL: CPT

## 2019-11-14 PROCEDURE — 36415 COLL VENOUS BLD VENIPUNCTURE: CPT

## 2019-11-14 RX ORDER — CLINDAMYCIN HYDROCHLORIDE 300 MG/1
CAPSULE ORAL
Qty: 10 CAPSULE | Refills: 1 | Status: SHIPPED | OUTPATIENT
Start: 2019-11-14 | End: 2020-11-14

## 2019-11-14 NOTE — PATIENT INSTRUCTIONS
Medicare Preventive Visit Patient Instructions  Thank you for completing your Welcome to Medicare Visit or Medicare Annual Wellness Visit today  Your next wellness visit will be due in one year (11/14/2020)  The screening/preventive services that you may require over the next 5-10 years are detailed below  Some tests may not apply to you based off risk factors and/or age  Screening tests ordered at today's visit but not completed yet may show as past due  Also, please note that scanned in results may not display below  Preventive Screenings:  Service Recommendations Previous Testing/Comments   Colorectal Cancer Screening  · Colonoscopy    · Fecal Occult Blood Test (FOBT)/Fecal Immunochemical Test (FIT)  · Fecal DNA/Cologuard Test  · Flexible Sigmoidoscopy Age: 54-65 years old   Colonoscopy: every 10 years (May be performed more frequently if at higher risk)  OR  FOBT/FIT: every 1 year  OR  Cologuard: every 3 years  OR  Sigmoidoscopy: every 5 years  Screening may be recommended earlier than age 48 if at higher risk for colorectal cancer  Also, an individualized decision between you and your healthcare provider will decide whether screening between the ages of 74-80 would be appropriate   Colonoscopy: 10/17/2009  FOBT/FIT: 12/17/2018  Cologuard: Not on file  Sigmoidoscopy: Not on file    Screening Current     Prostate Cancer Screening Individualized decision between patient and health care provider in men between ages of 53-78   Medicare will cover every 12 months beginning on the day after your 50th birthday PSA: 1 6 ng/mL     Screening Current     Hepatitis C Screening Once for adults born between [facility]  More frequently in patients at high risk for Hepatitis C Hep C Antibody: Not on file       Diabetes Screening 1-2 times per year if you're at risk for diabetes or have pre-diabetes Fasting glucose: 90 mg/dL   A1C: No results in last 5 years    Screening Current   Cholesterol Screening Once every 5 years if you don't have a lipid disorder  May order more often based on risk factors  Lipid panel: 04/22/2019    Screening Not Indicated  History Lipid Disorder      Other Preventive Screenings Covered by Medicare:  1  Abdominal Aortic Aneurysm (AAA) Screening: covered once if your at risk  You're considered to be at risk if you have a family history of AAA or a male between the age of 73-68 who smoking at least 100 cigarettes in your lifetime  2  Lung Cancer Screening: covers low dose CT scan once per year if you meet all of the following conditions: (1) Age 50-69; (2) No signs or symptoms of lung cancer; (3) Current smoker or have quit smoking within the last 15 years; (4) You have a tobacco smoking history of at least 30 pack years (packs per day x number of years you smoked); (5) You get a written order from a healthcare provider  3  Glaucoma Screening: covered annually if you're considered high risk: (1) You have diabetes OR (2) Family history of glaucoma OR (3)  aged 48 and older OR (3)  American aged 72 and older  3  Osteoporosis Screening: covered every 2 years if you meet one of the following conditions: (1) Have a vertebral abnormality; (2) On glucocorticoid therapy for more than 3 months; (3) Have primary hyperparathyroidism; (4) On osteoporosis medications and need to assess response to drug therapy  5  HIV Screening: covered annually if you're between the age of 12-76  Also covered annually if you are younger than 13 and older than 72 with risk factors for HIV infection  For pregnant patients, it is covered up to 3 times per pregnancy      Immunizations:  Immunization Recommendations   Influenza Vaccine Annual influenza vaccination during flu season is recommended for all persons aged >= 6 months who do not have contraindications   Pneumococcal Vaccine (Prevnar and Pneumovax)  * Prevnar = PCV13  * Pneumovax = PPSV23 Adults 25-60 years old: 1-3 doses may be recommended based on certain risk factors  Adults 72 years old: Prevnar (PCV13) vaccine recommended followed by Pneumovax (PPSV23) vaccine  If already received PPSV23 since turning 65, then PCV13 recommended at least one year after PPSV23 dose  Hepatitis B Vaccine 3 dose series if at intermediate or high risk (ex: diabetes, end stage renal disease, liver disease)   Tetanus (Td) Vaccine - COST NOT COVERED BY MEDICARE PART B Following completion of primary series, a booster dose should be given every 10 years to maintain immunity against tetanus  Td may also be given as tetanus wound prophylaxis  Tdap Vaccine - COST NOT COVERED BY MEDICARE PART B Recommended at least once for all adults  For pregnant patients, recommended with each pregnancy  Shingles Vaccine (Shingrix) - COST NOT COVERED BY MEDICARE PART B  2 shot series recommended in those aged 48 and above     Health Maintenance Due:      Topic Date Due    Hepatitis C Screening  1955    CRC Screening: Cologmaría elena  07/22/2007     Immunizations Due:  There are no preventive care reminders to display for this patient  Advance Directives   What are advance directives? Advance directives are legal documents that state your wishes and plans for medical care  These plans are made ahead of time in case you lose your ability to make decisions for yourself  Advance directives can apply to any medical decision, such as the treatments you want, and if you want to donate organs  What are the types of advance directives? There are many types of advance directives, and each state has rules about how to use them  You may choose a combination of any of the following:  · Living will: This is a written record of the treatment you want  You can also choose which treatments you do not want, which to limit, and which to stop at a certain time  This includes surgery, medicine, IV fluid, and tube feedings  · Durable power of  for healthcare [facility]):   This is a written record that states who you want to make healthcare choices for you when you are unable to make them for yourself  This person, called a proxy, is usually a family member or a friend  You may choose more than 1 proxy  · Do not resuscitate (DNR) order:  A DNR order is used in case your heart stops beating or you stop breathing  It is a request not to have certain forms of treatment, such as CPR  A DNR order may be included in other types of advance directives  · Medical directive: This covers the care that you want if you are in a coma, near death, or unable to make decisions for yourself  You can list the treatments you want for each condition  Treatment may include pain medicine, surgery, blood transfusions, dialysis, IV or tube feedings, and a ventilator (breathing machine)  · Values history: This document has questions about your views, beliefs, and how you feel and think about life  This information can help others choose the care that you would choose  Why are advance directives important? An advance directive helps you control your care  Although spoken wishes may be used, it is better to have your wishes written down  Spoken wishes can be misunderstood, or not followed  Treatments may be given even if you do not want them  An advance directive may make it easier for your family to make difficult choices about your care  Depression   Depression  is a medical condition that causes feelings of sadness or hopelessness that do not go away  Depression may cause you to lose interest in things you used to enjoy  These feelings may interfere with your daily life  Call your local emergency number (911 in the [address] [facility]) if:   · You think about harming yourself or someone else  · You have done something on purpose to hurt yourself    The following resources are available at any time to help you, if needed:   · [address] [facility]: 5-266.770.1039 (2-665-740-HRRN)   · Suicide Hotline: 1-247.438.4118 (2-187-HPETJKR) · For a list of international numbers: https://save org/find-help/international-resources/  Treatment for depression may include medicine to relieve depression  Medicine is often used together with therapy  Therapy is a way for you to talk about your feelings and anything that may be causing depression  Therapy can be done alone or in a group  It may also be done with family members or a significant other  · Get regular physical activity  · Create a regular sleep schedule  · Eat a variety of healthy foods  · Do not drink alcohol or use drugs  Weight Management   Why it is important to manage your weight:  Being overweight increases your risk of health conditions such as heart disease, high blood pressure, type 2 diabetes, and certain types of cancer  It can also increase your risk for osteoarthritis, sleep apnea, and other respiratory problems  Aim for a slow, steady weight loss  Even a small amount of weight loss can lower your risk of health problems  How to lose weight safely:  A safe and healthy way to lose weight is to eat fewer calories and get regular exercise  You can lose up about 1 pound a week by decreasing the number of calories you eat by 500 calories each day  Healthy meal plan for weight management:  A healthy meal plan includes a variety of foods, contains fewer calories, and helps you stay healthy  A healthy meal plan includes the following:  · Eat whole-grain foods more often  A healthy meal plan should contain fiber  Fiber is the part of grains, fruits, and vegetables that is not broken down by your body  Whole-grain foods are healthy and provide extra fiber in your diet  Some examples of whole-grain foods are whole-wheat breads and pastas, oatmeal, brown rice, and bulgur  · Eat a variety of vegetables every day  Include dark, leafy greens such as spinach, kale, lidia greens, and mustard greens   Eat yellow and orange vegetables such as carrots, sweet potatoes, and winter squash  · Eat a variety of fruits every day  Choose fresh or canned fruit (canned in its own juice or light syrup) instead of juice  Fruit juice has very little or no fiber  · Eat low-fat dairy foods  Drink fat-free (skim) milk or 1% milk  Eat fat-free yogurt and low-fat cottage cheese  Try low-fat cheeses such as mozzarella and other reduced-fat cheeses  · Choose meat and other protein foods that are low in fat  Choose beans or other legumes such as split peas or lentils  Choose fish, skinless poultry (chicken or turkey), or lean cuts of red meat (beef or pork)  Before you cook meat or poultry, cut off any visible fat  · Use less fat and oil  Try baking foods instead of frying them  Add less fat, such as margarine, sour cream, regular salad dressing and mayonnaise to foods  Eat fewer high-fat foods  Some examples of high-fat foods include french fries, doughnuts, ice cream, and cakes  · Eat fewer sweets  Limit foods and drinks that are high in sugar  This includes candy, cookies, regular soda, and sweetened drinks  Exercise:  Exercise at least 30 minutes per day on most days of the week  Some examples of exercise include walking, biking, dancing, and swimming  You can also fit in more physical activity by taking the stairs instead of the elevator or parking farther away from stores  Ask your healthcare provider about the best exercise plan for you  © Copyright [employer] 2018 Information is for End User's use only and may not be sold, redistributed or otherwise used for commercial purposes   All illustrations and images included in CareNotes® are the copyrighted property of A D A M , [employer]  or [address] [place] [address] [employer]

## 2019-11-14 NOTE — PROGRESS NOTES
FAMILY PRACTICE OFFICE VISIT       NAME: Eugenie Kaur  AGE: 60 y o  SEX: male       : 1955        MRN: [id number]        Assessment and Plan     Problem List Items Addressed This Visit        Cardiovascular and Mediastinum    Hypertension - Primary    Relevant Orders    CBC and differential (Completed)    Lipid Panel with Direct LDL reflex (Completed)    TSH, 3rd generation (Completed)    Comprehensive metabolic panel (Completed)    Aneurysm of ascending aorta (HCC)     Stable appearance of ascending thoracic aortic aneurysm of 41 millimeters on CT chest in 2018  Echocardiogram May 2019, ejection fraction 60%  The aortic root diameter was 40 mm  Recent follow-up with [facility]            Nervous and Auditory    Complex partial seizure evolving to generalized seizure ([address] )     · Continue [facility] follows            Other    Chronic pain disorder     · Patient remains on med regimen of medical marijuana,  gabapentin and meloxicam         Hyperlipidemia     · Zetia 10 mg daily, proceed with blood work including lipid panel         H/O total ankle replacement, right    Relevant Medications    clindamycin (CLEOCIN) 300 MG capsule      Other Visit Diagnoses     Mid back pain on right side        Relevant Orders    Urinalysis with microscopic (Completed)    Urine culture (Completed)    Encounter for Medicare annual wellness exam        Encounter for prostate cancer screening        Relevant Orders    PSA, Total Screen (Completed)    Need for hepatitis C screening test        Relevant Orders    Hepatitis C antibody (Completed)    Screening for colon cancer        Relevant Orders    Cologuard        Patient presents for follow-up of chronic medical conditions  Assessment and plan as outlined above  He will be proceeding with complete blood work including PSA  Intermittent right mid back pain  Clinically appears consistent with spasm    I advised patient to try Christian-Hansen or Aspercreme for the next 7-10 days  I would like to avoid muscle relaxants in his case giving ongoing therapy with gabapentin and Keppra as well as medical marijuana  I strongly advised patient to contact me if his discomfort is not improving, will proceed with further evaluation  Will check UA and urine culture  Patient will continue same daily medications for chronic medical conditions  He remains under ongoing care of [facility] and [facility]  Will proceed with Cologuard  Patient is up-to-date with flu vaccine this fall  Follow up pending labs, updates and in 6 months  Patient Instructions       Medicare Preventive Visit Patient Instructions  Thank you for completing your Welcome to Medicare Visit or Medicare Annual Wellness Visit today  Your next wellness visit will be due in one year (11/14/2020)  The screening/preventive services that you may require over the next 5-10 years are detailed below  Some tests may not apply to you based off risk factors and/or age  Screening tests ordered at today's visit but not completed yet may show as past due  Also, please note that scanned in results may not display below  Preventive Screenings:  Service Recommendations Previous Testing/Comments   Colorectal Cancer Screening  · Colonoscopy    · Fecal Occult Blood Test (FOBT)/Fecal Immunochemical Test (FIT)  · Fecal DNA/Cologuard Test  · Flexible Sigmoidoscopy Age: 54-65 years old   Colonoscopy: every 10 years (May be performed more frequently if at higher risk)  OR  FOBT/FIT: every 1 year  OR  Cologuard: every 3 years  OR  Sigmoidoscopy: every 5 years  Screening may be recommended earlier than age 48 if at higher risk for colorectal cancer  Also, an individualized decision between you and your healthcare provider will decide whether screening between the ages of 74-80 would be appropriate   Colonoscopy: 10/17/2009  FOBT/FIT: 12/17/2018  Cologuard: Not on file  Sigmoidoscopy: Not on file    Screening Current     Prostate Cancer Screening Individualized decision between patient and health care provider in men between ages of 53-78   Medicare will cover every 12 months beginning on the day after your 50th birthday PSA: 1 6 ng/mL     Screening Current     Hepatitis C Screening Once for adults born [facility]  More frequently in patients at high risk for Hepatitis C Hep C Antibody: Not on file       Diabetes Screening 1-2 times per year if you're at risk for diabetes or have pre-diabetes Fasting glucose: 90 mg/dL   A1C: No results in last 5 years    Screening Current   Cholesterol Screening Once every 5 years if you don't have a lipid disorder  May order more often based on risk factors  Lipid panel: 04/22/2019    Screening Not Indicated  History Lipid Disorder      Other Preventive Screenings Covered by Medicare:  1  Abdominal Aortic Aneurysm (AAA) Screening: covered once if your at risk  You're considered to be at risk if you have a family history of AAA or a male between the age of 73-68 who smoking at least 100 cigarettes in your lifetime  2  Lung Cancer Screening: covers low dose CT scan once per year if you meet all of the following conditions: (1) Age 50-69; (2) No signs or symptoms of lung cancer; (3) Current smoker or have quit smoking within the last 15 years; (4) You have a tobacco smoking history of at least 30 pack years (packs per day x number of years you smoked); (5) You get a written order from a healthcare provider  3  Glaucoma Screening: covered annually if you're considered high risk: (1) You have diabetes OR (2) Family history of glaucoma OR (3)  aged 48 and older OR (3)  American aged 72 and older  3   Osteoporosis Screening: covered every 2 years if you meet one of the following conditions: (1) Have a vertebral abnormality; (2) On glucocorticoid therapy for more than 3 months; (3) Have primary hyperparathyroidism; (4) On osteoporosis medications and need to assess response to drug therapy  5  HIV Screening: covered annually if you're between the age of 12-76  Also covered annually if you are younger than 13 and older than 72 with risk factors for HIV infection  For pregnant patients, it is covered up to 3 times per pregnancy  Immunizations:  Immunization Recommendations   Influenza Vaccine Annual influenza vaccination during flu season is recommended for all persons aged >= 6 months who do not have contraindications   Pneumococcal Vaccine (Prevnar and Pneumovax)  * Prevnar = PCV13  * Pneumovax = PPSV23 Adults 25-60 years old: 1-3 doses may be recommended based on certain risk factors  Adults 72 years old: Prevnar (PCV13) vaccine recommended followed by Pneumovax (PPSV23) vaccine  If already received PPSV23 since turning 65, then PCV13 recommended at least one year after PPSV23 dose  Hepatitis B Vaccine 3 dose series if at intermediate or high risk (ex: diabetes, end stage renal disease, liver disease)   Tetanus (Td) Vaccine - COST NOT COVERED BY MEDICARE PART B Following completion of primary series, a booster dose should be given every 10 years to maintain immunity against tetanus  Td may also be given as tetanus wound prophylaxis  Tdap Vaccine - COST NOT COVERED BY MEDICARE PART B Recommended at least once for all adults  For pregnant patients, recommended with each pregnancy  Shingles Vaccine (Shingrix) - COST NOT COVERED BY MEDICARE PART B  2 shot series recommended in those aged 48 and above     Health Maintenance Due:      Topic Date Due    Hepatitis C Screening  1955    CRC Screening: Cologuard  07/22/2007     Immunizations Due:  There are no preventive care reminders to display for this patient  Advance Directives   What are advance directives? Advance directives are legal documents that state your wishes and plans for medical care  These plans are made ahead of time in case you lose your ability to make decisions for yourself   Advance directives can apply to any medical decision, such as the treatments you want, and if you want to donate organs  What are the types of advance directives? There are many types of advance directives, and each state has rules about how to use them  You may choose a combination of any of the following:  · Living will: This is a written record of the treatment you want  You can also choose which treatments you do not want, which to limit, and which to stop at a certain time  This includes surgery, medicine, IV fluid, and tube feedings  · Durable power of  for healthcare [facility]): This is a written record that states who you want to make healthcare choices for you when you are unable to make them for yourself  This person, called a proxy, is usually a family member or a friend  You may choose more than 1 proxy  · Do not resuscitate (DNR) order:  A DNR order is used in case your heart stops beating or you stop breathing  It is a request not to have certain forms of treatment, such as CPR  A DNR order may be included in other types of advance directives  · Medical directive: This covers the care that you want if you are in a coma, near death, or unable to make decisions for yourself  You can list the treatments you want for each condition  Treatment may include pain medicine, surgery, blood transfusions, dialysis, IV or tube feedings, and a ventilator (breathing machine)  · Values history: This document has questions about your views, beliefs, and how you feel and think about life  This information can help others choose the care that you would choose  Why are advance directives important? An advance directive helps you control your care  Although spoken wishes may be used, it is better to have your wishes written down  Spoken wishes can be misunderstood, or not followed  Treatments may be given even if you do not want them   An advance directive may make it easier for your family to make difficult choices about your care  Depression   Depression  is a medical condition that causes feelings of sadness or hopelessness that do not go away  Depression may cause you to lose interest in things you used to enjoy  These feelings may interfere with your daily life  Call your local emergency number (911 in the [address] [place] [address] [address]) if:   · You think about harming yourself or someone else  · You have done something on purpose to hurt yourself  The following resources are available at any time to help you, if needed:   · [facility]: 8-715.977.6588 (3-774-768-QKTI)   · Suicide Hotline: 8-558.342.6752 (8-861-CSVACWZ)   · For a list of international numbers: https://save org/find-help/international-resources/  Treatment for depression may include medicine to relieve depression  Medicine is often used together with therapy  Therapy is a way for you to talk about your feelings and anything that may be causing depression  Therapy can be done alone or in a group  It may also be done with family members or a significant other  · Get regular physical activity  · Create a regular sleep schedule  · Eat a variety of healthy foods  · Do not drink alcohol or use drugs  Weight Management   Why it is important to manage your weight:  Being overweight increases your risk of health conditions such as heart disease, high blood pressure, type 2 diabetes, and certain types of cancer  It can also increase your risk for osteoarthritis, sleep apnea, and other respiratory problems  Aim for a slow, steady weight loss  Even a small amount of weight loss can lower your risk of health problems  How to lose weight safely:  A safe and healthy way to lose weight is to eat fewer calories and get regular exercise  You can lose up about 1 pound a week by decreasing the number of calories you eat by 500 calories each day     Healthy meal plan for weight management:  A healthy meal plan includes a variety of foods, contains fewer calories, and helps you stay healthy  A healthy meal plan includes the following:  · Eat whole-grain foods more often  A healthy meal plan should contain fiber  Fiber is the part of grains, fruits, and vegetables that is not broken down by your body  Whole-grain foods are healthy and provide extra fiber in your diet  Some examples of whole-grain foods are whole-wheat breads and pastas, oatmeal, brown rice, and bulgur  · Eat a variety of vegetables every day  Include dark, leafy greens such as spinach, kale, lidia greens, and mustard greens  Eat yellow and orange vegetables such as carrots, sweet potatoes, and winter squash  · Eat a variety of fruits every day  Choose fresh or canned fruit (canned in its own juice or light syrup) instead of juice  Fruit juice has very little or no fiber  · Eat low-fat dairy foods  Drink fat-free (skim) milk or 1% milk  Eat fat-free yogurt and low-fat cottage cheese  Try low-fat cheeses such as mozzarella and other reduced-fat cheeses  · Choose meat and other protein foods that are low in fat  Choose beans or other legumes such as split peas or lentils  Choose fish, skinless poultry (chicken or turkey), or lean cuts of red meat (beef or pork)  Before you cook meat or poultry, cut off any visible fat  · Use less fat and oil  Try baking foods instead of frying them  Add less fat, such as margarine, sour cream, regular salad dressing and mayonnaise to foods  Eat fewer high-fat foods  Some examples of high-fat foods include french fries, doughnuts, ice cream, and cakes  · Eat fewer sweets  Limit foods and drinks that are high in sugar  This includes candy, cookies, regular soda, and sweetened drinks  Exercise:  Exercise at least 30 minutes per day on most days of the week  Some examples of exercise include walking, biking, dancing, and swimming  You can also fit in more physical activity by taking the stairs instead of the elevator or parking farther away from stores  Ask your healthcare provider about the best exercise plan for you  © Copyright [employer] 2018 Information is for End User's use only and may not be sold, redistributed or otherwise used for commercial purposes  All illustrations and images included in CareNotes® are the copyrighted property of A D Venyo  or Alesia Florencia Rachel Palma      Discussed with the patient and all questioned fully answered  He will call me if any problems arise  M*Modal software was used to dictate this note  It may contain errors with dictating incorrect words/spelling  Please contact provider directly with any questions  Chief Complaint     Chief Complaint   Patient presents with    Medicare Wellness Visit    Follow-up     6 month       History of Present Illness     Patient presents for follow-up of chronic medical conditions  He is feeling stably  S/p right  Ankle revision - Dr Morris, S/p metal plate placement, surgery  went  well     Patient complains of mild right mid back discomfort, on and off for over a months  No trouble urinating, no hematuria, no radiation of discomfort  Symptoms are somewhat positional and are worse after prolonged sitting or standing  No fever, no rash   Patient did not try any remedy-for this discomfort  He denies abdominal pain, nausea, vomiting or dyspepsia  Chronic arthritic pain in lower back, hips and knees  Patient remains on meloxicam and gabapentin  He uses low-dose of lisinopril for hypertension and is on Zetia for hyperlipidemia  Patient remains under care of Cardiology and PCP for surveillance of ascending thoracic aortic aneurysm    Ct chest  4/2018    ECHO 5/2019    Recent  OV visit with  DR Causey   Seizure disorder, patient is on Keppra, [facility] neurology - DR Franklin Bella, annual follow-up        Review of Systems   Review of Systems   Constitutional: Positive for fatigue (Chronic)  HENT: Negative  Eyes: Negative  Respiratory: Negative  Cardiovascular: Negative  Gastrointestinal: Negative  Musculoskeletal: Positive for arthralgias and back pain  Skin: Negative  Neurological: Negative  Hematological: Negative  Psychiatric/Behavioral: Negative          Active Problem List     Patient Active Problem List   Diagnosis    DDD (degenerative disc disease), cervical    Complex partial seizure evolving to generalized seizure ([facility] )    Chronic pain disorder    DDD (degenerative disc disease), lumbosacral    Hyperlipidemia    Hypertension    Aneurysm of ascending aorta (HCC)    Numbness and tingling in both hands    Polyneuropathy    Medical marijuana use    H/O total ankle replacement, right    History of failed arthroplasty of ankle       Past Medical History:  Past Medical History:   Diagnosis Date    Aneurysm, ascending aorta ([facility] )     Anxiety disorder     Arthritis     Last assessed: 11/17/16    Asthma     DDD (degenerative disc disease), cervical     Depression     Hyperlipidemia     Hypertension     Seizures ([facility] )     Stroke syndrome        Past Surgical History:  Past Surgical History:   Procedure Laterality Date    ANKLE SURGERY      COLONOSCOPY  2009    Due 2014    HERNIA REPAIR      ROOT CANAL      TOTAL HIP ARTHROPLASTY Left 02/2014    TOTAL HIP ARTHROPLASTY Right        Family History:  Family History   Problem Relation Age of Onset    Anxiety disorder Mother         Symptom/disorder    Hypertension Mother         Benign Essential    Mitral valve prolapse Mother         Echo/Systolic    PABLO disease Mother     Fibromyalgia Mother     Hyperlipidemia Mother     Diabetes Father         Mellitus    Cancer Brother     Stroke Family         CVA    Cancer Family     Sudden death Family         Instantaneous Cardiac Death    Other Family         Connective Tissue Defect       Social History:  Social History     Socioeconomic History    Marital status:      Spouse name: Not on file    Number of children: Not on file    Years of education: Bachelor's Degree    Highest education level: Not on file   Occupational History    Not on file   Social Needs    Financial resource strain: Not on file    Food insecurity:     Worry: Not on file     Inability: Not on file    Transportation needs:     Medical: Not on file     Non-medical: Not on file   Tobacco Use    Smoking status: Never Smoker    Smokeless tobacco: Never Used   Substance and Sexual Activity    Alcohol use: Yes     Comment: Occasionally    Drug use: Yes     Types: Marijuana     Comment: medical marijuana    Sexual activity: Not on file   Lifestyle    Physical activity:     Days per week: Not on file     Minutes per session: Not on file    Stress: Not on file   Relationships    Social connections:     Talks on phone: Not on file     Gets together: Not on file     Attends Confucianism service: Not on file     Active member of club or organization: Not on file     Attends meetings of clubs or organizations: Not on file     Relationship status: Not on file    Intimate partner violence:     Fear of current or ex partner: Not on file     Emotionally abused: Not on file     Physically abused: Not on file     Forced sexual activity: Not on file   Other Topics Concern    Not on file   Social History Narrative    Daily coffee consumption: 7 cups/day    Per [employer]: Currently      PHQ-9 Depression Screening    PHQ-9:    Frequency of the following problems over the past two weeks:       Little interest or pleasure in doing things:  3 - nearly every day  Feeling down, depressed, or hopeless:  3 - nearly every day  Trouble falling or staying asleep, or sleeping too much:  3 - nearly every day  Feeling tired or having little energy:  2 - more than half the days  Poor appetite or overeatin - several days  Feeling bad about yourself - or that you are a failure or have let yourself or your family down:  2 - more than half the days  Trouble concentrating on things, such as reading the newspaper or watching television:  0 - not at all  Moving or speaking so slowly that other people could have noticed  Or the opposite - being so fidgety or restless that you have been moving around a lot more than usual:  0 - not at all  Thoughts that you would be better off dead, or of hurting yourself in some way:  0 - not at all  PHQ-2 Score:  6  PHQ-9 Score:  14         Objective     Vitals:    11/14/19 0733   BP: 122/76   BP Location: Left arm   Patient Position: Sitting   Cuff Size: Adult   Pulse: 62   Resp: 18   Temp: 98 6 °F (37 °C)   TempSrc: Tympanic   SpO2: 99%   Weight: 94 2 kg (207 lb 9 6 oz)   Height: 6' 2" (1 88 m)     Wt Readings from Last 3 Encounters:   11/14/19 94 2 kg (207 lb 9 6 oz)   10/17/19 91 8 kg (202 lb 4 8 oz)   06/18/19 89 4 kg (197 lb)       Physical Exam   Constitutional: He is oriented to person, place, and time  He appears well-developed and well-nourished  HENT:   Head: Normocephalic and atraumatic  Eyes: Conjunctivae are normal    Neck: Neck supple  Carotid bruit is not present  Cardiovascular: Normal rate, regular rhythm and normal heart sounds  No murmur heard  Pulmonary/Chest: Effort normal and breath sounds normal  No respiratory distress  He has no wheezes  He has no rales  Abdominal: Bowel sounds are normal  He exhibits no distension and no abdominal bruit  There is no CVA tenderness  Musculoskeletal: Normal range of motion  He exhibits no edema  Thoracic back: He exhibits spasm (Right paraspinal, reproducible with flexion and extension)  He exhibits no tenderness, no bony tenderness, no edema and no deformity  Neurological: He is alert and oriented to person, place, and time  No cranial nerve deficit  Psychiatric: He has a normal mood and affect  His behavior is normal    Nursing note and vitals reviewed        Pertinent Laboratory/Diagnostic Studies:  Lab Results   Component Value Date    GLUCOSE 83 12/18/2015    BUN 19 11/14/2019    CREATININE 0 82 11/14/2019    CALCIUM 8 8 11/14/2019     12/18/2015    K 4 2 11/14/2019    CO2 27 11/14/2019     11/14/2019     Lab Results   Component Value Date    ALT 16 11/14/2019    AST 13 11/14/2019    ALKPHOS 77 11/14/2019    BILITOT 0 54 12/18/2015       Lab Results   Component Value Date    WBC 5 35 11/14/2019    HGB 12 6 11/14/2019    HCT 40 4 11/14/2019    MCV 94 11/14/2019     11/14/2019       No results found for: TSH    Lab Results   Component Value Date    CHOL 200 12/18/2015     Lab Results   Component Value Date    TRIG 71 11/14/2019     Lab Results   Component Value Date    HDL 35 (L) 11/14/2019     Lab Results   Component Value Date    LDLCALC 105 (H) 11/14/2019     Lab Results   Component Value Date    HGBA1C 4 9 03/18/2014       Results for orders placed or performed in visit on 11/14/19   Urine culture   Result Value Ref Range    Urine Culture [id number] cfu/ml     Urinalysis with microscopic   Result Value Ref Range    Clarity, UA Clear     Color, UA Yellow     Specific Gravity, UA 1 007 1 003 - 1 030    pH, UA 6 5 4 5, 5 0, 5 5, 6 0, 6 5, 7 0, 7 5, 8 0    Glucose, UA Negative Negative mg/dl    Ketones, UA Negative Negative mg/dl    Blood, UA Negative Negative    Protein, UA Negative Negative mg/dl    Nitrite, UA Negative Negative    Bilirubin, UA Negative Negative    Urobilinogen, UA 0 2 0 2, 1 0 E U /dl E U /dl    Leukocytes, UA Negative Negative    WBC, UA None Seen None Seen, 0-5, 5-55, 5-65 /hpf    RBC, UA None Seen None Seen, 0-5 /hpf    Bacteria, UA None Seen None Seen, Occasional /hpf    Epithelial Cells None Seen None Seen, Occasional /hpf       Orders Placed This Encounter   Procedures    Urine culture    Cologuard    CBC and differential    Lipid Panel with Direct LDL reflex    TSH, 3rd generation    Urinalysis with microscopic    PSA, Total Screen    Hepatitis C antibody    Comprehensive metabolic panel ALLERGIES:  Allergies   Allergen Reactions    Gluten Meal Other (See Comments)     UNKNOWN       Current Medications     Current Outpatient Medications   Medication Sig Dispense Refill    amoxicillin (AMOXIL) 500 MG tablet Take 4 tablets 1 hour prior to dental procedure 20 tablet 1    aspirin 81 MG tablet Take 81 mg by mouth daily   calcium-vitamin D (OSCAL 500 + D) 500 mg-200 units per tablet Take 1 tablet by mouth daily   CANNABIDIOL PO Take by mouth      Cholecalciferol (VITAMIN D3) 2000 units TABS Take 2,000 Units by mouth daily      clindamycin (CLEOCIN) 300 MG capsule Take 2 capsules by mouth 1 hour prior to dental procedure 10 capsule 1    ezetimibe (ZETIA) 10 mg tablet TAKE ONE TABLET BY MOUTH EVERY DAY 30 tablet 5    gabapentin (NEURONTIN) 300 mg capsule TAKE ONE CAPSULE BY MOUTH EVERY MORNING, ONE CAPSULE AT MIDDAY, TAKE TWO CAPSULES BY MOUTH AT BEDTIME 120 capsule 3    ibuprofen (MOTRIN) 800 mg tablet       levETIRAcetam (KEPPRA) 500 mg tablet Take 2 tablets (1,000 mg total) by mouth every 12 (twelve) hours 360 tablet 3    lisinopril (ZESTRIL) 2 5 mg tablet Take 1 tablet (2 5 mg total) by mouth daily 90 tablet 1    meloxicam (MOBIC) 7 5 mg tablet TAKE ONE TABLET BY MOUTH TWICE A DAY AFTER MEALS AS NEEDED FOR JOINT PAIN 60 tablet 3    Omega-3 Fatty Acids (FISH OIL) 1200 MG CAPS Take by mouth daily      oxyCODONE-acetaminophen (PERCOCET) 5-325 mg per tablet        No current facility-administered medications for this visit          Medications Discontinued During This Encounter   Medication Reason    lisinopril (ZESTRIL) 2 5 mg tablet Duplicate order    clindamycin (CLEOCIN) 300 MG capsule Reorder       Health Maintenance     Health Maintenance   Topic Date Due    Medicare Annual Wellness Visit (AWV)  1955    SLP PLAN OF CARE  1955    HIV Screening  07/22/1972    BMI: Followup Plan  07/22/1975    CRC Screening: Karolyn  07/22/2007    DTaP,Tdap,and Td Vaccines (1 - Tdap) 04/18/2020 (Originally 7/22/1968)    Depression Screening PHQ  11/14/2020    BMI: Adult  11/14/2020    Pneumococcal Vaccine: 65+ Years (1 of 2 - PCV13) 07/22/2022    Hepatitis C Screening  Completed    Influenza Vaccine  Completed    Pneumococcal Vaccine: Pediatrics (0 to 5 Years) and At-Risk Patients (6 to 59 Years)  Aged Out    HIB Vaccine  Aged Out    Hepatitis B Vaccine  Aged Out    IPV Vaccine  Aged Out    Hepatitis A Vaccine  Aged Out    Meningococcal ACWY Vaccine  Aged Out    HPV Vaccine  Aged [employer] History   Administered Date(s) Administered    Influenza TIV (IM) 10/09/2014, 09/16/2015    influenza, injectable, quadrivalent 10/29/2019       Christiano Jung MD

## 2019-11-14 NOTE — PROGRESS NOTES
Assessment and Plan:     Problem List Items Addressed This Visit        Cardiovascular and Mediastinum    Hypertension - Primary    Relevant Orders    CBC and differential (Completed)    Lipid Panel with Direct LDL reflex (Completed)    TSH, 3rd generation (Completed)    Comprehensive metabolic panel (Completed)    Aneurysm of ascending aorta (HCC)     Stable appearance of ascending thoracic aortic aneurysm of 41 millimeters on CT chest in April of 2018  Echocardiogram May 2019, ejection fraction 60%  The aortic root diameter was 40 mm  Recent follow-up with [facility]            Nervous and Auditory    Complex partial seizure evolving to generalized seizure ([address] )     · Continue Keppra, [facility] follows            Other    Chronic pain disorder     · Patient remains on med regimen of medical marijuana,  gabapentin and meloxicam         Hyperlipidemia     · Zetia 10 mg daily, proceed with blood work including lipid panel         H/O total ankle replacement, right    Relevant Medications    clindamycin (CLEOCIN) 300 MG capsule      Other Visit Diagnoses     Mid back pain on right side        Relevant Orders    Urinalysis with microscopic (Completed)    Urine culture (Completed)    Encounter for Medicare annual wellness exam        Encounter for prostate cancer screening        Relevant Orders    PSA, Total Screen (Completed)    Need for hepatitis C screening test        Relevant Orders    Hepatitis C antibody (Completed)    Screening for colon cancer        Relevant Orders    Cologuard        BMI Counseling: Body mass index is 26 65 kg/m²  The BMI is above normal  Nutrition recommendations include encouraging healthy choices of fruits and vegetables and reducing intake of cholesterol  Exercise recommendations include exercising 3-5 times per week  Depression Screening and Follow-up Plan: Patient's depression screening was positive with a PHQ-2 score of 6  Their PHQ-9 score was 14  Depression likely due to other medical condition  Will treat underlying condition  Chronic depression symptoms due to chronic pain  Preventive health issues were discussed with patient, and age appropriate screening tests were ordered as noted in patient's After Visit Summary  Personalized health advice and appropriate referrals for health education or preventive services given if needed, as noted in patient's After Visit Summary       History of Present Illness:     Patient presents for Medicare Annual Wellness visit    Patient Care Team:  Samantha Mosqueda MD as PCP - General  SRIRAM Mckee MD Felton Creighton, MD Phil Montoya, DO Daija Cao, MD Dustin Nguyen DO     Problem List:     Patient Active Problem List   Diagnosis    DDD (degenerative disc disease), cervical    Complex partial seizure evolving to generalized seizure ([address] )    Chronic pain disorder    DDD (degenerative disc disease), lumbosacral    Hyperlipidemia    Hypertension    Aneurysm of ascending aorta (HCC)    Numbness and tingling in both hands    Polyneuropathy    Medical marijuana use    H/O total ankle replacement, right    History of failed arthroplasty of ankle      Past Medical and Surgical History:     Past Medical History:   Diagnosis Date    Aneurysm, ascending aorta ([address] )     Anxiety disorder     Arthritis     Last assessed: 11/17/16    Asthma     DDD (degenerative disc disease), cervical     Depression     Hyperlipidemia     Hypertension     Seizures ([address] )     Stroke syndrome      Past Surgical History:   Procedure Laterality Date    ANKLE SURGERY      COLONOSCOPY  2009    Due 2014    HERNIA REPAIR      ROOT CANAL      TOTAL HIP ARTHROPLASTY Left 02/2014    TOTAL HIP ARTHROPLASTY Right       Family History:     Family History   Problem Relation Age of Onset    Anxiety disorder Mother         Symptom/disorder    Hypertension Mother Benign Essential    Mitral valve prolapse Mother         Echo/Systolic    PABLO disease Mother     Fibromyalgia Mother     Hyperlipidemia Mother     Diabetes Father         Mellitus    Cancer Brother     Stroke Family         CVA    Cancer Family     Sudden death Family         Instantaneous Cardiac Death    Other Family         Connective Tissue Defect      Social History:     Social History     Socioeconomic History    Marital status:      Spouse name: None    Number of children: None    Years of education: Bachelor's Degree    Highest education level: None   Occupational History    None   Social Needs    Financial resource strain: None    Food insecurity:     Worry: None     Inability: None    Transportation needs:     Medical: None     Non-medical: None   Tobacco Use    Smoking status: Never Smoker    Smokeless tobacco: Never Used   Substance and Sexual Activity    Alcohol use: Yes     Comment: Occasionally    Drug use: Yes     Types: Marijuana     Comment: medical marijuana    Sexual activity: None   Lifestyle    Physical activity:     Days per week: None     Minutes per session: None    Stress: None   Relationships    Social connections:     Talks on phone: None     Gets together: None     Attends Gnosticism service: None     Active member of club or organization: None     Attends meetings of clubs or organizations: None     Relationship status: None    Intimate partner violence:     Fear of current or ex partner: None     Emotionally abused: None     Physically abused: None     Forced sexual activity: None   Other Topics Concern    None   Social History Narrative    Daily coffee consumption: 7 cups/day    Per [employer]: Currently        Medications and Allergies:     Current Outpatient Medications   Medication Sig Dispense Refill    amoxicillin (AMOXIL) 500 MG tablet Take 4 tablets 1 hour prior to dental procedure 20 tablet 1    aspirin 81 MG tablet Take 81 mg by mouth daily   calcium-vitamin D (OSCAL 500 + D) 500 mg-200 units per tablet Take 1 tablet by mouth daily   CANNABIDIOL PO Take by mouth      Cholecalciferol (VITAMIN D3) 2000 units TABS Take 2,000 Units by mouth daily      clindamycin (CLEOCIN) 300 MG capsule Take 2 capsules by mouth 1 hour prior to dental procedure 10 capsule 1    ezetimibe (ZETIA) 10 mg tablet TAKE ONE TABLET BY MOUTH EVERY DAY 30 tablet 5    gabapentin (NEURONTIN) 300 mg capsule TAKE ONE CAPSULE BY MOUTH EVERY MORNING, ONE CAPSULE AT MIDDAY, TAKE TWO CAPSULES BY MOUTH AT BEDTIME 120 capsule 3    ibuprofen (MOTRIN) 800 mg tablet       levETIRAcetam (KEPPRA) 500 mg tablet Take 2 tablets (1,000 mg total) by mouth every 12 (twelve) hours 360 tablet 3    lisinopril (ZESTRIL) 2 5 mg tablet Take 1 tablet (2 5 mg total) by mouth daily 90 tablet 1    meloxicam (MOBIC) 7 5 mg tablet TAKE ONE TABLET BY MOUTH TWICE A DAY AFTER MEALS AS NEEDED FOR JOINT PAIN 60 tablet 3    Omega-3 Fatty Acids (FISH OIL) 1200 MG CAPS Take by mouth daily      oxyCODONE-acetaminophen (PERCOCET) 5-325 mg per tablet        No current facility-administered medications for this visit  Allergies   Allergen Reactions    Gluten Meal Other (See Comments)     UNKNOWN      Immunizations:     Immunization History   Administered Date(s) Administered    Influenza TIV (IM) 10/09/2014, 09/16/2015    influenza, injectable, quadrivalent 10/29/2019      Health Maintenance:         Topic Date Due    CRC Screening: Karolyn  07/22/2007    Hepatitis C Screening  Completed     There are no preventive care reminders to display for this patient  Medicare Health Risk Assessment:     /76 (BP Location: Left arm, Patient Position: Sitting, Cuff Size: Adult)   Pulse 62   Temp 98 6 °F (37 °C) (Tympanic)   Resp 18   Ht 6' 2" (1 88 m)   Wt 94 2 kg (207 lb 9 6 oz)   SpO2 99%   BMI 26 65 kg/m²          Health Risk Assessment:   Patient rates overall health as fair  Patient feels that their physical health rating is slightly worse  Eyesight was rated as slightly worse  Hearing was rated as same  Patient feels that their emotional and mental health rating is slightly worse  Pain experienced in the last 7 days has been some  Patient's pain rating has been 6/10  Depression Screening:   PHQ-2 Score: 6  PHQ-9 Score: 14      Fall Risk Screening: In the past year, patient has experienced: history of falling in past year    Number of falls: 2 or more  Injured during fall?: Yes    Feels unsteady when standing or walking?: No    Worried about falling?: No      Home Safety:  Patient has trouble with stairs inside or outside of their home  Patient has working smoke alarms and has no working carbon monoxide detector  Home safety hazards include: loose rugs on the floor  Nutrition:   Current diet is Low Cholesterol, Low Saturated Fat and No Added Salt  Medications:   Patient is not currently taking any over-the-counter supplements  Patient is able to manage medications  Activities of Daily Living (ADLs)/Instrumental Activities of Daily Living (IADLs):   Walk and transfer into and out of bed and chair?: Yes  Dress and groom yourself?: Yes    Bathe or shower yourself?: Yes    Feed yourself? Yes  Do your laundry/housekeeping?: Yes  Manage your money, pay your bills and track your expenses?: Yes  Make your own meals?: Yes    Do your own shopping?: Yes    Previous Hospitalizations:   Any hospitalizations or ED visits within the last 12 months?: Yes    How many hospitalizations have you had in the last year?: 1-2    Advance Care Planning:   Living will: Yes    Durable POA for healthcare:  Yes    Advanced directive: Yes    Advanced directive counseling given: Yes    Five wishes given: Yes      Cognitive Screening:   Provider or family/friend/caregiver concerned regarding cognition?: No    PREVENTIVE SCREENINGS      Cardiovascular Screening:    General: Screening Not Indicated and History Lipid Disorder      Diabetes Screening:     General: Screening Current      Colorectal Cancer Screening:     General: Screening Current    Due for: Cologuard      Prostate Cancer Screening:    General: Screening Current      Osteoporosis Screening:    General: Screening Not Indicated      Abdominal Aortic Aneurysm (AAA) Screening:        General: Screening Not Indicated      Lung Cancer Screening:     General: Screening Not Indicated      Hepatitis C Screening:    General: Screening Current      Brice Yao MD

## 2019-11-15 LAB — BACTERIA UR CULT: NORMAL

## 2019-11-17 PROBLEM — R97.20 INCREASED PROSTATE SPECIFIC ANTIGEN (PSA) VELOCITY: Status: RESOLVED | Noted: 2018-10-26 | Resolved: 2019-11-17

## 2019-11-17 NOTE — ASSESSMENT & PLAN NOTE
Stable appearance of ascending thoracic aortic aneurysm of 41 millimeters on CT chest in April of 2018  Echocardiogram May 2019, ejection fraction 60%  The aortic root diameter was 40 mm      Recent follow-up with [facility]

## 2019-11-19 ENCOUNTER — TELEPHONE (OUTPATIENT)
Dept: NEUROLOGY | Facility: CLINIC | Age: 64
End: 2019-11-19

## 2019-11-22 ENCOUNTER — TELEPHONE (OUTPATIENT)
Dept: FAMILY MEDICINE CLINIC | Facility: CLINIC | Age: 64
End: 2019-11-22

## 2019-11-22 NOTE — TELEPHONE ENCOUNTER
----- Message from Tanya May MD sent at 11/17/2019  6:10 PM EST -----  Regarding: Karolyn  Please print and fax orders for Karolyn    Thank you

## 2020-01-24 DIAGNOSIS — M50.30 DDD (DEGENERATIVE DISC DISEASE), CERVICAL: ICD-10-CM

## 2020-01-24 RX ORDER — GABAPENTIN 300 MG/1
CAPSULE ORAL
Qty: 120 CAPSULE | Refills: 0 | Status: SHIPPED | OUTPATIENT
Start: 2020-01-24 | End: 2020-01-30

## 2020-01-29 DIAGNOSIS — E78.5 HYPERLIPIDEMIA, UNSPECIFIED HYPERLIPIDEMIA TYPE: ICD-10-CM

## 2020-01-29 DIAGNOSIS — M50.30 DDD (DEGENERATIVE DISC DISEASE), CERVICAL: ICD-10-CM

## 2020-01-30 RX ORDER — GABAPENTIN 300 MG/1
CAPSULE ORAL
Qty: 120 CAPSULE | Refills: 0 | Status: SHIPPED | OUTPATIENT
Start: 2020-01-30 | End: 2020-03-04 | Stop reason: SDUPTHER

## 2020-01-30 RX ORDER — EZETIMIBE 10 MG/1
TABLET ORAL
Qty: 30 TABLET | Refills: 3 | Status: SHIPPED | OUTPATIENT
Start: 2020-01-30 | End: 2020-11-11 | Stop reason: SDUPTHER

## 2020-03-01 DIAGNOSIS — G40.209 COMPLEX PARTIAL SEIZURE EVOLVING TO GENERALIZED SEIZURE (HCC): ICD-10-CM

## 2020-03-02 RX ORDER — LEVETIRACETAM 500 MG/1
TABLET ORAL
Qty: 360 TABLET | Refills: 3 | Status: SHIPPED | OUTPATIENT
Start: 2020-03-02 | End: 2021-04-13

## 2020-03-03 ENCOUNTER — TELEPHONE (OUTPATIENT)
Dept: FAMILY MEDICINE CLINIC | Facility: CLINIC | Age: 65
End: 2020-03-03

## 2020-03-03 DIAGNOSIS — M50.30 DDD (DEGENERATIVE DISC DISEASE), CERVICAL: ICD-10-CM

## 2020-03-03 RX ORDER — GABAPENTIN 300 MG/1
CAPSULE ORAL
Qty: 120 CAPSULE | Refills: 0 | Status: CANCELLED | OUTPATIENT
Start: 2020-03-03

## 2020-03-03 NOTE — TELEPHONE ENCOUNTER
----- Message from Christiano Jung MD sent at 3/3/2020  8:29 AM EST -----  Please contact patient  His Cologuard testing was negative which is good news! Patient's current follow-up with scheduled for mid May  I would appreciate if he could move his follow-up appointment for earlier date, sometime within next few weeks    Thank you

## 2020-03-04 RX ORDER — GABAPENTIN 300 MG/1
CAPSULE ORAL
Qty: 120 CAPSULE | Refills: 3 | Status: SHIPPED | OUTPATIENT
Start: 2020-03-04 | End: 2020-10-04

## 2020-03-14 DIAGNOSIS — M51.36 DDD (DEGENERATIVE DISC DISEASE), LUMBAR: ICD-10-CM

## 2020-03-14 RX ORDER — MELOXICAM 7.5 MG/1
TABLET ORAL
Qty: 60 TABLET | Refills: 3 | Status: SHIPPED | OUTPATIENT
Start: 2020-03-14 | End: 2020-08-11

## 2020-03-23 ENCOUNTER — TELEPHONE (OUTPATIENT)
Dept: FAMILY MEDICINE CLINIC | Facility: CLINIC | Age: 65
End: 2020-03-23

## 2020-03-23 NOTE — TELEPHONE ENCOUNTER
Juan David Mccall,    You spoke with patient's mom today  He actually was scheduled as per her request a week ago on Tuesday and was no-show  Could you please follow-up  Patient with multiple comorbidities  If needed, I will be more than happy to do the virtual visit    Thank you

## 2020-03-24 ENCOUNTER — PATIENT OUTREACH (OUTPATIENT)
Dept: FAMILY MEDICINE CLINIC | Facility: CLINIC | Age: 65
End: 2020-03-24

## 2020-03-24 NOTE — PROGRESS NOTES
Spoke with Jimenez Tapia at PCP office explained patient needs follow up appointment made she will call him to reschedule

## 2020-03-24 NOTE — PROGRESS NOTES
Spoke with patient at request of PCP to reach out to reschedule appointment that he no showed on 3/17/20  Daria March answered and he apologized for missing the appointment  He said he has a lot going on   His father has , he is not working and his mother is in a nursing home  He said I should have PCP office call him and he will reschedule

## 2020-05-20 DIAGNOSIS — I10 ESSENTIAL HYPERTENSION: ICD-10-CM

## 2020-05-22 RX ORDER — LISINOPRIL 2.5 MG/1
TABLET ORAL
Qty: 90 TABLET | Refills: 1 | Status: SHIPPED | OUTPATIENT
Start: 2020-05-22 | End: 2020-05-26

## 2020-05-26 ENCOUNTER — LAB (OUTPATIENT)
Dept: LAB | Facility: CLINIC | Age: 65
End: 2020-05-26
Payer: COMMERCIAL

## 2020-05-26 ENCOUNTER — OFFICE VISIT (OUTPATIENT)
Dept: FAMILY MEDICINE CLINIC | Facility: CLINIC | Age: 65
End: 2020-05-26
Payer: COMMERCIAL

## 2020-05-26 VITALS
DIASTOLIC BLOOD PRESSURE: 70 MMHG | SYSTOLIC BLOOD PRESSURE: 98 MMHG | BODY MASS INDEX: 22.65 KG/M2 | RESPIRATION RATE: 16 BRPM | HEART RATE: 70 BPM | HEIGHT: 74 IN | WEIGHT: 176.5 LBS | OXYGEN SATURATION: 95 % | TEMPERATURE: 97.1 F

## 2020-05-26 DIAGNOSIS — I71.2 ANEURYSM OF ASCENDING AORTA (HCC): ICD-10-CM

## 2020-05-26 DIAGNOSIS — E78.5 HYPERLIPIDEMIA, UNSPECIFIED HYPERLIPIDEMIA TYPE: ICD-10-CM

## 2020-05-26 DIAGNOSIS — G89.4 CHRONIC PAIN DISORDER: ICD-10-CM

## 2020-05-26 DIAGNOSIS — Z20.828 EXPOSURE TO SARS-ASSOCIATED CORONAVIRUS: ICD-10-CM

## 2020-05-26 DIAGNOSIS — I10 ESSENTIAL HYPERTENSION: Primary | ICD-10-CM

## 2020-05-26 DIAGNOSIS — I10 ESSENTIAL HYPERTENSION: ICD-10-CM

## 2020-05-26 DIAGNOSIS — G40.209 COMPLEX PARTIAL SEIZURE EVOLVING TO GENERALIZED SEIZURE (HCC): ICD-10-CM

## 2020-05-26 LAB
ALBUMIN SERPL BCP-MCNC: 4.2 G/DL (ref 3.5–5)
ALP SERPL-CCNC: 63 U/L (ref 46–116)
ALT SERPL W P-5'-P-CCNC: 16 U/L (ref 12–78)
ANION GAP SERPL CALCULATED.3IONS-SCNC: 7 MMOL/L (ref 4–13)
AST SERPL W P-5'-P-CCNC: 16 U/L (ref 5–45)
BASOPHILS # BLD AUTO: 0.06 THOUSANDS/ΜL (ref 0–0.1)
BASOPHILS NFR BLD AUTO: 1 % (ref 0–1)
BILIRUB SERPL-MCNC: 0.62 MG/DL (ref 0.2–1)
BUN SERPL-MCNC: 22 MG/DL (ref 5–25)
CALCIUM SERPL-MCNC: 9.3 MG/DL (ref 8.3–10.1)
CHLORIDE SERPL-SCNC: 104 MMOL/L (ref 100–108)
CHOLEST SERPL-MCNC: 186 MG/DL (ref 50–200)
CO2 SERPL-SCNC: 26 MMOL/L (ref 21–32)
CREAT SERPL-MCNC: 0.89 MG/DL (ref 0.6–1.3)
EOSINOPHIL # BLD AUTO: 0.06 THOUSAND/ΜL (ref 0–0.61)
EOSINOPHIL NFR BLD AUTO: 1 % (ref 0–6)
ERYTHROCYTE [DISTWIDTH] IN BLOOD BY AUTOMATED COUNT: 12.8 % (ref 11.6–15.1)
GFR SERPL CREATININE-BSD FRML MDRD: 92 ML/MIN/1.73SQ M
GLUCOSE P FAST SERPL-MCNC: 74 MG/DL (ref 65–99)
HCT VFR BLD AUTO: 41.7 % (ref 36.5–49.3)
HDLC SERPL-MCNC: 35 MG/DL
HGB BLD-MCNC: 13.4 G/DL (ref 12–17)
IMM GRANULOCYTES # BLD AUTO: 0.02 THOUSAND/UL (ref 0–0.2)
IMM GRANULOCYTES NFR BLD AUTO: 0 % (ref 0–2)
LDLC SERPL CALC-MCNC: 132 MG/DL (ref 0–100)
LYMPHOCYTES # BLD AUTO: 1.49 THOUSANDS/ΜL (ref 0.6–4.47)
LYMPHOCYTES NFR BLD AUTO: 23 % (ref 14–44)
MCH RBC QN AUTO: 30.3 PG (ref 26.8–34.3)
MCHC RBC AUTO-ENTMCNC: 32.1 G/DL (ref 31.4–37.4)
MCV RBC AUTO: 94 FL (ref 82–98)
MONOCYTES # BLD AUTO: 0.49 THOUSAND/ΜL (ref 0.17–1.22)
MONOCYTES NFR BLD AUTO: 8 % (ref 4–12)
NEUTROPHILS # BLD AUTO: 4.42 THOUSANDS/ΜL (ref 1.85–7.62)
NEUTS SEG NFR BLD AUTO: 67 % (ref 43–75)
NONHDLC SERPL-MCNC: 151 MG/DL
NRBC BLD AUTO-RTO: 0 /100 WBCS
PLATELET # BLD AUTO: 217 THOUSANDS/UL (ref 149–390)
PMV BLD AUTO: 10.9 FL (ref 8.9–12.7)
POTASSIUM SERPL-SCNC: 4 MMOL/L (ref 3.5–5.3)
PROT SERPL-MCNC: 7.5 G/DL (ref 6.4–8.2)
RBC # BLD AUTO: 4.42 MILLION/UL (ref 3.88–5.62)
SODIUM SERPL-SCNC: 137 MMOL/L (ref 136–145)
TRIGL SERPL-MCNC: 97 MG/DL
TSH SERPL DL<=0.05 MIU/L-ACNC: 2.83 UIU/ML (ref 0.36–3.74)
WBC # BLD AUTO: 6.54 THOUSAND/UL (ref 4.31–10.16)

## 2020-05-26 PROCEDURE — 80061 LIPID PANEL: CPT

## 2020-05-26 PROCEDURE — 36415 COLL VENOUS BLD VENIPUNCTURE: CPT

## 2020-05-26 PROCEDURE — 3074F SYST BP LT 130 MM HG: CPT | Performed by: FAMILY MEDICINE

## 2020-05-26 PROCEDURE — 1036F TOBACCO NON-USER: CPT | Performed by: FAMILY MEDICINE

## 2020-05-26 PROCEDURE — 99214 OFFICE O/P EST MOD 30 MIN: CPT | Performed by: FAMILY MEDICINE

## 2020-05-26 PROCEDURE — 84443 ASSAY THYROID STIM HORMONE: CPT

## 2020-05-26 PROCEDURE — 3078F DIAST BP <80 MM HG: CPT | Performed by: FAMILY MEDICINE

## 2020-05-26 PROCEDURE — 3008F BODY MASS INDEX DOCD: CPT | Performed by: FAMILY MEDICINE

## 2020-05-26 PROCEDURE — 85025 COMPLETE CBC W/AUTO DIFF WBC: CPT

## 2020-05-26 PROCEDURE — 80053 COMPREHEN METABOLIC PANEL: CPT

## 2020-05-26 PROCEDURE — 86769 SARS-COV-2 COVID-19 ANTIBODY: CPT

## 2020-05-27 LAB — SARS-COV-2 IGG SERPL QL IA: NEGATIVE

## 2020-08-10 DIAGNOSIS — M51.36 DDD (DEGENERATIVE DISC DISEASE), LUMBAR: ICD-10-CM

## 2020-08-11 RX ORDER — MELOXICAM 7.5 MG/1
TABLET ORAL
Qty: 60 TABLET | Refills: 1 | Status: SHIPPED | OUTPATIENT
Start: 2020-08-11 | End: 2020-11-11 | Stop reason: SDUPTHER

## 2020-10-01 DIAGNOSIS — M50.30 DDD (DEGENERATIVE DISC DISEASE), CERVICAL: ICD-10-CM

## 2020-10-04 RX ORDER — GABAPENTIN 300 MG/1
CAPSULE ORAL
Qty: 120 CAPSULE | Refills: 3 | Status: SHIPPED | OUTPATIENT
Start: 2020-10-04 | End: 2021-03-10

## 2020-11-04 ENCOUNTER — HOSPITAL ENCOUNTER (EMERGENCY)
Facility: HOSPITAL | Age: 65
Discharge: HOME | End: 2020-11-04
Payer: COMMERCIAL

## 2020-11-04 ENCOUNTER — APPOINTMENT (EMERGENCY)
Dept: RADIOLOGY | Facility: HOSPITAL | Age: 65
End: 2020-11-04
Payer: COMMERCIAL

## 2020-11-04 VITALS
WEIGHT: 176 LBS | DIASTOLIC BLOOD PRESSURE: 79 MMHG | HEIGHT: 74 IN | HEART RATE: 72 BPM | OXYGEN SATURATION: 100 % | RESPIRATION RATE: 20 BRPM | SYSTOLIC BLOOD PRESSURE: 118 MMHG | TEMPERATURE: 98.3 F | BODY MASS INDEX: 22.59 KG/M2
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
## 2020-11-04 DIAGNOSIS — M54.50 LEFT LOW BACK PAIN: ICD-10-CM

## 2020-11-04 DIAGNOSIS — M25.552 LEFT HIP PAIN: ICD-10-CM

## 2020-11-04 PROCEDURE — 99284 EMERGENCY DEPT VISIT MOD MDM: CPT | Performed by: PHYSICIAN ASSISTANT

## 2020-11-04 PROCEDURE — 99283 EMERGENCY DEPT VISIT LOW MDM: CPT

## 2020-11-04 PROCEDURE — 73502 X-RAY EXAM HIP UNI 2-3 VIEWS: CPT

## 2020-11-04 PROCEDURE — 72100 X-RAY EXAM L-S SPINE 2/3 VWS: CPT

## 2020-11-11 ENCOUNTER — OFFICE VISIT (OUTPATIENT)
Dept: FAMILY MEDICINE CLINIC | Facility: CLINIC | Age: 65
End: 2020-11-11
Payer: COMMERCIAL

## 2020-11-11 VITALS
HEART RATE: 72 BPM | HEIGHT: 74 IN | SYSTOLIC BLOOD PRESSURE: 110 MMHG | OXYGEN SATURATION: 98 % | TEMPERATURE: 97.5 F | BODY MASS INDEX: 21.15 KG/M2 | WEIGHT: 164.8 LBS | RESPIRATION RATE: 16 BRPM | DIASTOLIC BLOOD PRESSURE: 80 MMHG

## 2020-11-11 DIAGNOSIS — E78.5 HYPERLIPIDEMIA, UNSPECIFIED HYPERLIPIDEMIA TYPE: ICD-10-CM

## 2020-11-11 DIAGNOSIS — I71.2 ANEURYSM OF ASCENDING AORTA (HCC): ICD-10-CM

## 2020-11-11 DIAGNOSIS — G89.4 CHRONIC PAIN DISORDER: ICD-10-CM

## 2020-11-11 DIAGNOSIS — G40.209 COMPLEX PARTIAL SEIZURE EVOLVING TO GENERALIZED SEIZURE (HCC): ICD-10-CM

## 2020-11-11 DIAGNOSIS — Z23 INFLUENZA VACCINE NEEDED: ICD-10-CM

## 2020-11-11 DIAGNOSIS — M51.36 DDD (DEGENERATIVE DISC DISEASE), LUMBAR: ICD-10-CM

## 2020-11-11 DIAGNOSIS — Z12.5 ENCOUNTER FOR PROSTATE CANCER SCREENING: ICD-10-CM

## 2020-11-11 DIAGNOSIS — I10 ESSENTIAL HYPERTENSION: Primary | ICD-10-CM

## 2020-11-11 PROCEDURE — 90682 RIV4 VACC RECOMBINANT DNA IM: CPT | Performed by: FAMILY MEDICINE

## 2020-11-11 PROCEDURE — 1036F TOBACCO NON-USER: CPT | Performed by: FAMILY MEDICINE

## 2020-11-11 PROCEDURE — 99214 OFFICE O/P EST MOD 30 MIN: CPT | Performed by: FAMILY MEDICINE

## 2020-11-11 PROCEDURE — G0008 ADMIN INFLUENZA VIRUS VAC: HCPCS | Performed by: FAMILY MEDICINE

## 2020-11-11 RX ORDER — MELOXICAM 7.5 MG/1
TABLET ORAL
Qty: 60 TABLET | Refills: 5 | Status: SHIPPED | OUTPATIENT
Start: 2020-11-11 | End: 2021-05-11 | Stop reason: SDUPTHER

## 2020-11-11 RX ORDER — EZETIMIBE 10 MG/1
10 TABLET ORAL DAILY
Qty: 30 TABLET | Refills: 5 | Status: SHIPPED | OUTPATIENT
Start: 2020-11-11 | End: 2020-12-04

## 2020-11-14 ENCOUNTER — APPOINTMENT (OUTPATIENT)
Dept: RADIOLOGY | Facility: HOSPITAL | Age: 65
End: 2020-11-14
Payer: COMMERCIAL

## 2020-11-14 ENCOUNTER — HOSPITAL ENCOUNTER (OUTPATIENT)
Facility: HOSPITAL | Age: 65
Setting detail: OBSERVATION
Discharge: HOME | End: 2020-11-15
Attending: EMERGENCY MEDICINE | Admitting: SURGERY
Payer: COMMERCIAL

## 2020-11-14 ENCOUNTER — APPOINTMENT (EMERGENCY)
Dept: RADIOLOGY | Facility: HOSPITAL | Age: 65
End: 2020-11-14
Payer: COMMERCIAL
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
## 2020-11-14 DIAGNOSIS — S22.49XA MULTIPLE RIB FRACTURES: Primary | ICD-10-CM

## 2020-11-14 LAB
ALBUMIN SERPL BCP-MCNC: 3.9 G/DL (ref 3.5–5)
ALP SERPL-CCNC: 78 U/L (ref 46–116)
ALT SERPL W P-5'-P-CCNC: 16 U/L (ref 12–78)
ANION GAP SERPL CALCULATED.3IONS-SCNC: 13 MMOL/L (ref 4–13)
AST SERPL W P-5'-P-CCNC: 14 U/L (ref 5–45)
BASOPHILS # BLD AUTO: 0.06 THOUSANDS/ΜL (ref 0–0.1)
BASOPHILS NFR BLD AUTO: 1 % (ref 0–1)
BILIRUB SERPL-MCNC: 0.38 MG/DL (ref 0.2–1)
BUN SERPL-MCNC: 18 MG/DL (ref 5–25)
CALCIUM SERPL-MCNC: 9.3 MG/DL (ref 8.3–10.1)
CHLORIDE SERPL-SCNC: 106 MMOL/L (ref 100–108)
CO2 SERPL-SCNC: 22 MMOL/L (ref 21–32)
CREAT SERPL-MCNC: 0.99 MG/DL (ref 0.6–1.3)
EOSINOPHIL # BLD AUTO: 0.05 THOUSAND/ΜL (ref 0–0.61)
EOSINOPHIL NFR BLD AUTO: 1 % (ref 0–6)
ERYTHROCYTE [DISTWIDTH] IN BLOOD BY AUTOMATED COUNT: 12.5 % (ref 11.6–15.1)
GFR SERPL CREATININE-BSD FRML MDRD: 81 ML/MIN/1.73SQ M
GLUCOSE SERPL-MCNC: 109 MG/DL (ref 65–140)
HCT VFR BLD AUTO: 39.9 % (ref 36.5–49.3)
HGB BLD-MCNC: 12.8 G/DL (ref 12–17)
IMM GRANULOCYTES # BLD AUTO: 0.03 THOUSAND/UL (ref 0–0.2)
IMM GRANULOCYTES NFR BLD AUTO: 0 % (ref 0–2)
LYMPHOCYTES # BLD AUTO: 1.96 THOUSANDS/ΜL (ref 0.6–4.47)
LYMPHOCYTES NFR BLD AUTO: 24 % (ref 14–44)
MCH RBC QN AUTO: 30.5 PG (ref 26.8–34.3)
MCHC RBC AUTO-ENTMCNC: 32.1 G/DL (ref 31.4–37.4)
MCV RBC AUTO: 95 FL (ref 82–98)
MONOCYTES # BLD AUTO: 0.61 THOUSAND/ΜL (ref 0.17–1.22)
MONOCYTES NFR BLD AUTO: 8 % (ref 4–12)
NEUTROPHILS # BLD AUTO: 5.42 THOUSANDS/ΜL (ref 1.85–7.62)
NEUTS SEG NFR BLD AUTO: 66 % (ref 43–75)
NRBC BLD AUTO-RTO: 0 /100 WBCS
PLATELET # BLD AUTO: 273 THOUSANDS/UL (ref 149–390)
PMV BLD AUTO: 11 FL (ref 8.9–12.7)
POTASSIUM SERPL-SCNC: 3.3 MMOL/L (ref 3.5–5.3)
PROT SERPL-MCNC: 7.4 G/DL (ref 6.4–8.2)
RBC # BLD AUTO: 4.2 MILLION/UL (ref 3.88–5.62)
SODIUM SERPL-SCNC: 141 MMOL/L (ref 136–145)
WBC # BLD AUTO: 8.13 THOUSAND/UL (ref 4.31–10.16)

## 2020-11-14 PROCEDURE — 70450 CT HEAD/BRAIN W/O DYE: CPT

## 2020-11-14 PROCEDURE — 36415 COLL VENOUS BLD VENIPUNCTURE: CPT | Performed by: EMERGENCY MEDICINE

## 2020-11-14 PROCEDURE — 74177 CT ABD & PELVIS W/CONTRAST: CPT

## 2020-11-14 PROCEDURE — 85025 COMPLETE CBC W/AUTO DIFF WBC: CPT | Performed by: EMERGENCY MEDICINE

## 2020-11-14 PROCEDURE — G1004 CDSM NDSC: HCPCS

## 2020-11-14 PROCEDURE — 80053 COMPREHEN METABOLIC PANEL: CPT | Performed by: EMERGENCY MEDICINE

## 2020-11-14 PROCEDURE — 96374 THER/PROPH/DIAG INJ IV PUSH: CPT

## 2020-11-14 PROCEDURE — 71260 CT THORAX DX C+: CPT

## 2020-11-14 PROCEDURE — 90471 IMMUNIZATION ADMIN: CPT

## 2020-11-14 PROCEDURE — 99285 EMERGENCY DEPT VISIT HI MDM: CPT

## 2020-11-14 PROCEDURE — 99219 PR INITIAL OBSERVATION CARE/DAY 50 MINUTES: CPT | Performed by: SURGERY

## 2020-11-14 PROCEDURE — 90715 TDAP VACCINE 7 YRS/> IM: CPT | Performed by: EMERGENCY MEDICINE

## 2020-11-14 PROCEDURE — 71045 X-RAY EXAM CHEST 1 VIEW: CPT

## 2020-11-14 PROCEDURE — 72125 CT NECK SPINE W/O DYE: CPT

## 2020-11-14 PROCEDURE — 99285 EMERGENCY DEPT VISIT HI MDM: CPT | Performed by: EMERGENCY MEDICINE

## 2020-11-14 RX ORDER — LISINOPRIL 2.5 MG/1
2.5 TABLET ORAL DAILY
Status: DISCONTINUED | OUTPATIENT
Start: 2020-11-14 | End: 2020-11-15 | Stop reason: HOSPADM

## 2020-11-14 RX ORDER — KETOROLAC TROMETHAMINE 30 MG/ML
15 INJECTION, SOLUTION INTRAMUSCULAR; INTRAVENOUS ONCE
Status: DISCONTINUED | OUTPATIENT
Start: 2020-11-14 | End: 2020-11-14

## 2020-11-14 RX ORDER — EZETIMIBE 10 MG/1
10 TABLET ORAL DAILY
Status: DISCONTINUED | OUTPATIENT
Start: 2020-11-14 | End: 2020-11-15 | Stop reason: HOSPADM

## 2020-11-14 RX ORDER — LEVETIRACETAM 500 MG/1
1000 TABLET ORAL EVERY 12 HOURS
Status: DISCONTINUED | OUTPATIENT
Start: 2020-11-14 | End: 2020-11-15 | Stop reason: HOSPADM

## 2020-11-14 RX ORDER — HYDROMORPHONE HCL/PF 1 MG/ML
0.5 SYRINGE (ML) INJECTION
Status: DISCONTINUED | OUTPATIENT
Start: 2020-11-14 | End: 2020-11-15 | Stop reason: HOSPADM

## 2020-11-14 RX ORDER — OXYCODONE HYDROCHLORIDE 5 MG/1
5 TABLET ORAL EVERY 4 HOURS PRN
Status: DISCONTINUED | OUTPATIENT
Start: 2020-11-14 | End: 2020-11-15 | Stop reason: HOSPADM

## 2020-11-14 RX ORDER — LIDOCAINE 50 MG/G
1 PATCH TOPICAL ONCE
Status: COMPLETED | OUTPATIENT
Start: 2020-11-14 | End: 2020-11-14

## 2020-11-14 RX ORDER — CYCLOBENZAPRINE HCL 10 MG
10 TABLET ORAL 3 TIMES DAILY
Status: DISCONTINUED | OUTPATIENT
Start: 2020-11-14 | End: 2020-11-15 | Stop reason: HOSPADM

## 2020-11-14 RX ORDER — LIDOCAINE 50 MG/G
2 PATCH TOPICAL DAILY
Status: DISCONTINUED | OUTPATIENT
Start: 2020-11-15 | End: 2020-11-15 | Stop reason: HOSPADM

## 2020-11-14 RX ORDER — LIDOCAINE 50 MG/G
2 PATCH TOPICAL DAILY
Status: DISCONTINUED | OUTPATIENT
Start: 2020-11-14 | End: 2020-11-14

## 2020-11-14 RX ORDER — HEPARIN SODIUM 5000 [USP'U]/ML
5000 INJECTION, SOLUTION INTRAVENOUS; SUBCUTANEOUS EVERY 8 HOURS SCHEDULED
Status: DISCONTINUED | OUTPATIENT
Start: 2020-11-14 | End: 2020-11-15 | Stop reason: HOSPADM

## 2020-11-14 RX ORDER — ACETAMINOPHEN 325 MG/1
975 TABLET ORAL EVERY 8 HOURS SCHEDULED
Status: DISCONTINUED | OUTPATIENT
Start: 2020-11-14 | End: 2020-11-15 | Stop reason: HOSPADM

## 2020-11-14 RX ORDER — GABAPENTIN 300 MG/1
300 CAPSULE ORAL
Status: DISCONTINUED | OUTPATIENT
Start: 2020-11-14 | End: 2020-11-15 | Stop reason: HOSPADM

## 2020-11-14 RX ORDER — KETOROLAC TROMETHAMINE 30 MG/ML
15 INJECTION, SOLUTION INTRAMUSCULAR; INTRAVENOUS ONCE
Status: COMPLETED | OUTPATIENT
Start: 2020-11-14 | End: 2020-11-14

## 2020-11-14 RX ORDER — OXYCODONE HYDROCHLORIDE 10 MG/1
10 TABLET ORAL EVERY 4 HOURS PRN
Status: DISCONTINUED | OUTPATIENT
Start: 2020-11-14 | End: 2020-11-15 | Stop reason: HOSPADM

## 2020-11-14 RX ADMIN — GABAPENTIN 300 MG: 300 CAPSULE ORAL at 22:16

## 2020-11-14 RX ADMIN — KETOROLAC TROMETHAMINE 15 MG: 30 INJECTION, SOLUTION INTRAMUSCULAR at 10:36

## 2020-11-14 RX ADMIN — ACETAMINOPHEN 975 MG: 325 TABLET, FILM COATED ORAL at 22:16

## 2020-11-14 RX ADMIN — LISINOPRIL 2.5 MG: 2.5 TABLET ORAL at 13:32

## 2020-11-14 RX ADMIN — LIDOCAINE 1 PATCH: 50 PATCH TOPICAL at 11:13

## 2020-11-14 RX ADMIN — CYCLOBENZAPRINE HYDROCHLORIDE 10 MG: 10 TABLET, FILM COATED ORAL at 13:37

## 2020-11-14 RX ADMIN — TETANUS TOXOID, REDUCED DIPHTHERIA TOXOID AND ACELLULAR PERTUSSIS VACCINE, ADSORBED 0.5 ML: 5; 2.5; 8; 8; 2.5 SUSPENSION INTRAMUSCULAR at 16:44

## 2020-11-14 RX ADMIN — LEVETIRACETAM 1000 MG: 500 TABLET ORAL at 22:56

## 2020-11-14 RX ADMIN — CYCLOBENZAPRINE HYDROCHLORIDE 10 MG: 10 TABLET, FILM COATED ORAL at 20:40

## 2020-11-14 RX ADMIN — ACETAMINOPHEN 975 MG: 325 TABLET, FILM COATED ORAL at 14:11

## 2020-11-14 RX ADMIN — IOHEXOL 100 ML: 350 INJECTION, SOLUTION INTRAVENOUS at 10:34

## 2020-11-14 RX ADMIN — HEPARIN SODIUM 5000 UNITS: 5000 INJECTION INTRAVENOUS; SUBCUTANEOUS at 22:17

## 2020-11-14 RX ADMIN — HEPARIN SODIUM 5000 UNITS: 5000 INJECTION INTRAVENOUS; SUBCUTANEOUS at 14:10

## 2020-11-15 ENCOUNTER — APPOINTMENT (OUTPATIENT)
Dept: RADIOLOGY | Facility: HOSPITAL | Age: 65
End: 2020-11-15
Payer: COMMERCIAL

## 2020-11-15 VITALS
WEIGHT: 164 LBS | HEIGHT: 74 IN | OXYGEN SATURATION: 95 % | BODY MASS INDEX: 21.05 KG/M2 | HEART RATE: 65 BPM | RESPIRATION RATE: 16 BRPM | DIASTOLIC BLOOD PRESSURE: 67 MMHG | SYSTOLIC BLOOD PRESSURE: 115 MMHG | TEMPERATURE: 97.8 F

## 2020-11-15 PROBLEM — S22.32XA FRACTURE OF ONE RIB OF LEFT SIDE: Status: ACTIVE | Noted: 2020-11-15

## 2020-11-15 PROBLEM — S22.42XA MULTIPLE FRACTURES OF RIBS, LEFT SIDE, INIT FOR CLOS FX: Status: ACTIVE | Noted: 2020-11-15

## 2020-11-15 PROBLEM — S22.32XA FRACTURE OF ONE RIB OF LEFT SIDE: Status: RESOLVED | Noted: 2020-11-15 | Resolved: 2020-11-15

## 2020-11-15 LAB
ANION GAP SERPL CALCULATED.3IONS-SCNC: 7 MMOL/L (ref 4–13)
BUN SERPL-MCNC: 17 MG/DL (ref 5–25)
CALCIUM SERPL-MCNC: 8.7 MG/DL (ref 8.3–10.1)
CHLORIDE SERPL-SCNC: 108 MMOL/L (ref 100–108)
CO2 SERPL-SCNC: 25 MMOL/L (ref 21–32)
CREAT SERPL-MCNC: 0.72 MG/DL (ref 0.6–1.3)
ERYTHROCYTE [DISTWIDTH] IN BLOOD BY AUTOMATED COUNT: 12.4 % (ref 11.6–15.1)
GFR SERPL CREATININE-BSD FRML MDRD: 99 ML/MIN/1.73SQ M
GLUCOSE SERPL-MCNC: 83 MG/DL (ref 65–140)
HCT VFR BLD AUTO: 37.2 % (ref 36.5–49.3)
HGB BLD-MCNC: 11.9 G/DL (ref 12–17)
MCH RBC QN AUTO: 30.2 PG (ref 26.8–34.3)
MCHC RBC AUTO-ENTMCNC: 32 G/DL (ref 31.4–37.4)
MCV RBC AUTO: 94 FL (ref 82–98)
PLATELET # BLD AUTO: 198 THOUSANDS/UL (ref 149–390)
PMV BLD AUTO: 10.8 FL (ref 8.9–12.7)
POTASSIUM SERPL-SCNC: 3.5 MMOL/L (ref 3.5–5.3)
RBC # BLD AUTO: 3.94 MILLION/UL (ref 3.88–5.62)
SODIUM SERPL-SCNC: 140 MMOL/L (ref 136–145)
WBC # BLD AUTO: 5.34 THOUSAND/UL (ref 4.31–10.16)

## 2020-11-15 PROCEDURE — 80048 BASIC METABOLIC PNL TOTAL CA: CPT | Performed by: EMERGENCY MEDICINE

## 2020-11-15 PROCEDURE — 94762 N-INVAS EAR/PLS OXIMTRY CONT: CPT

## 2020-11-15 PROCEDURE — 71046 X-RAY EXAM CHEST 2 VIEWS: CPT

## 2020-11-15 PROCEDURE — 99217 PR OBSERVATION CARE DISCHARGE MANAGEMENT: CPT | Performed by: NURSE PRACTITIONER

## 2020-11-15 PROCEDURE — 85027 COMPLETE CBC AUTOMATED: CPT | Performed by: EMERGENCY MEDICINE

## 2020-11-15 PROCEDURE — NC001 PR NO CHARGE: Performed by: SURGERY

## 2020-11-15 RX ORDER — ACETAMINOPHEN 325 MG/1
975 TABLET ORAL EVERY 8 HOURS SCHEDULED
Qty: 1 BOTTLE | Refills: 0 | Status: SHIPPED | OUTPATIENT
Start: 2020-11-15

## 2020-11-15 RX ADMIN — LIDOCAINE 2 PATCH: 50 PATCH TOPICAL at 08:07

## 2020-11-15 RX ADMIN — ACETAMINOPHEN 975 MG: 325 TABLET, FILM COATED ORAL at 05:42

## 2020-11-15 RX ADMIN — LISINOPRIL 2.5 MG: 2.5 TABLET ORAL at 08:06

## 2020-11-15 RX ADMIN — EZETIMIBE 10 MG: 10 TABLET ORAL at 08:07

## 2020-11-15 RX ADMIN — CYCLOBENZAPRINE HYDROCHLORIDE 10 MG: 10 TABLET, FILM COATED ORAL at 08:07

## 2020-11-15 RX ADMIN — HEPARIN SODIUM 5000 UNITS: 5000 INJECTION INTRAVENOUS; SUBCUTANEOUS at 05:42

## 2020-11-16 ENCOUNTER — TRANSITIONAL CARE MANAGEMENT (OUTPATIENT)
Dept: FAMILY MEDICINE CLINIC | Facility: CLINIC | Age: 65
End: 2020-11-16

## 2020-11-25 ENCOUNTER — OFFICE VISIT (OUTPATIENT)
Dept: FAMILY MEDICINE CLINIC | Facility: CLINIC | Age: 65
End: 2020-11-25
Payer: COMMERCIAL

## 2020-11-25 VITALS
RESPIRATION RATE: 17 BRPM | BODY MASS INDEX: 21.77 KG/M2 | SYSTOLIC BLOOD PRESSURE: 106 MMHG | DIASTOLIC BLOOD PRESSURE: 70 MMHG | OXYGEN SATURATION: 98 % | HEART RATE: 64 BPM | WEIGHT: 169.6 LBS | TEMPERATURE: 98.2 F | HEIGHT: 74 IN

## 2020-11-25 DIAGNOSIS — I71.2 ANEURYSM OF ASCENDING AORTA (HCC): ICD-10-CM

## 2020-11-25 DIAGNOSIS — G89.4 CHRONIC PAIN DISORDER: ICD-10-CM

## 2020-11-25 DIAGNOSIS — S22.42XA MULTIPLE FRACTURES OF RIBS, LEFT SIDE, INIT FOR CLOS FX: Primary | ICD-10-CM

## 2020-11-25 DIAGNOSIS — Z79.899 MEDICAL MARIJUANA USE: ICD-10-CM

## 2020-11-25 PROCEDURE — 3008F BODY MASS INDEX DOCD: CPT | Performed by: FAMILY MEDICINE

## 2020-11-25 PROCEDURE — 99495 TRANSJ CARE MGMT MOD F2F 14D: CPT | Performed by: FAMILY MEDICINE

## 2020-12-04 ENCOUNTER — OFFICE VISIT (OUTPATIENT)
Dept: CARDIOLOGY CLINIC | Facility: CLINIC | Age: 65
End: 2020-12-04
Payer: COMMERCIAL

## 2020-12-04 VITALS
DIASTOLIC BLOOD PRESSURE: 62 MMHG | BODY MASS INDEX: 21.1 KG/M2 | HEART RATE: 70 BPM | HEIGHT: 74 IN | SYSTOLIC BLOOD PRESSURE: 98 MMHG | WEIGHT: 164.4 LBS

## 2020-12-04 DIAGNOSIS — E78.5 HYPERLIPIDEMIA, UNSPECIFIED HYPERLIPIDEMIA TYPE: Primary | ICD-10-CM

## 2020-12-04 DIAGNOSIS — I10 ESSENTIAL HYPERTENSION: ICD-10-CM

## 2020-12-04 DIAGNOSIS — I71.2 ANEURYSM OF ASCENDING AORTA (HCC): ICD-10-CM

## 2020-12-04 PROCEDURE — 99214 OFFICE O/P EST MOD 30 MIN: CPT | Performed by: INTERNAL MEDICINE

## 2020-12-04 PROCEDURE — 1036F TOBACCO NON-USER: CPT | Performed by: INTERNAL MEDICINE

## 2020-12-04 PROCEDURE — 93000 ELECTROCARDIOGRAM COMPLETE: CPT | Performed by: INTERNAL MEDICINE

## 2020-12-04 PROCEDURE — 3008F BODY MASS INDEX DOCD: CPT | Performed by: INTERNAL MEDICINE

## 2020-12-04 PROCEDURE — 3074F SYST BP LT 130 MM HG: CPT | Performed by: INTERNAL MEDICINE

## 2020-12-04 PROCEDURE — 1111F DSCHRG MED/CURRENT MED MERGE: CPT | Performed by: INTERNAL MEDICINE

## 2020-12-04 PROCEDURE — 3078F DIAST BP <80 MM HG: CPT | Performed by: INTERNAL MEDICINE

## 2020-12-04 RX ORDER — ROSUVASTATIN CALCIUM 10 MG/1
10 TABLET, COATED ORAL DAILY
Qty: 90 TABLET | Refills: 3 | Status: SHIPPED | OUTPATIENT
Start: 2020-12-04 | End: 2021-05-11 | Stop reason: SDUPTHER

## 2021-01-28 DIAGNOSIS — I10 ESSENTIAL HYPERTENSION: Primary | ICD-10-CM

## 2021-01-28 RX ORDER — LISINOPRIL 2.5 MG/1
TABLET ORAL
Qty: 90 TABLET | Refills: 1 | Status: SHIPPED | OUTPATIENT
Start: 2021-01-28 | End: 2021-05-11 | Stop reason: SDUPTHER

## 2021-03-10 DIAGNOSIS — M50.30 DDD (DEGENERATIVE DISC DISEASE), CERVICAL: ICD-10-CM

## 2021-03-10 RX ORDER — GABAPENTIN 300 MG/1
CAPSULE ORAL
Qty: 120 CAPSULE | Refills: 0 | Status: SHIPPED | OUTPATIENT
Start: 2021-03-10 | End: 2021-04-26

## 2021-04-13 DIAGNOSIS — G40.209 COMPLEX PARTIAL SEIZURE EVOLVING TO GENERALIZED SEIZURE (HCC): ICD-10-CM

## 2021-04-13 RX ORDER — LEVETIRACETAM 500 MG/1
TABLET ORAL
Qty: 360 TABLET | Refills: 1 | Status: SHIPPED | OUTPATIENT
Start: 2021-04-13 | End: 2021-08-09 | Stop reason: SDUPTHER

## 2021-04-13 NOTE — TELEPHONE ENCOUNTER
Received a request for medication refill  Patient has not been seen in nearly 2 years and does not have a follow up scheduled  Please call patient to schedule a follow up appointment so we can provide refills  If we have not seen him in 2 years, we will not be able to continue to provide refills

## 2021-04-26 DIAGNOSIS — M50.30 DDD (DEGENERATIVE DISC DISEASE), CERVICAL: ICD-10-CM

## 2021-04-26 RX ORDER — GABAPENTIN 300 MG/1
CAPSULE ORAL
Qty: 120 CAPSULE | Refills: 5 | Status: SHIPPED | OUTPATIENT
Start: 2021-04-26 | End: 2021-08-18

## 2021-05-11 ENCOUNTER — OFFICE VISIT (OUTPATIENT)
Dept: FAMILY MEDICINE CLINIC | Facility: CLINIC | Age: 66
End: 2021-05-11
Payer: COMMERCIAL

## 2021-05-11 VITALS
WEIGHT: 182.8 LBS | RESPIRATION RATE: 16 BRPM | DIASTOLIC BLOOD PRESSURE: 78 MMHG | OXYGEN SATURATION: 97 % | TEMPERATURE: 97.8 F | HEIGHT: 74 IN | SYSTOLIC BLOOD PRESSURE: 122 MMHG | HEART RATE: 71 BPM | BODY MASS INDEX: 23.46 KG/M2

## 2021-05-11 DIAGNOSIS — F33.9 DEPRESSION, RECURRENT (HCC): ICD-10-CM

## 2021-05-11 DIAGNOSIS — G40.209 COMPLEX PARTIAL SEIZURE EVOLVING TO GENERALIZED SEIZURE (HCC): ICD-10-CM

## 2021-05-11 DIAGNOSIS — Z00.00 ENCOUNTER FOR MEDICARE ANNUAL WELLNESS EXAM: ICD-10-CM

## 2021-05-11 DIAGNOSIS — G89.29 CHRONIC RIGHT-SIDED LOW BACK PAIN WITHOUT SCIATICA: ICD-10-CM

## 2021-05-11 DIAGNOSIS — M54.50 CHRONIC RIGHT-SIDED LOW BACK PAIN WITHOUT SCIATICA: ICD-10-CM

## 2021-05-11 DIAGNOSIS — I10 ESSENTIAL HYPERTENSION: Primary | ICD-10-CM

## 2021-05-11 DIAGNOSIS — I71.2 ANEURYSM OF ASCENDING AORTA (HCC): ICD-10-CM

## 2021-05-11 DIAGNOSIS — E78.5 HYPERLIPIDEMIA, UNSPECIFIED HYPERLIPIDEMIA TYPE: ICD-10-CM

## 2021-05-11 DIAGNOSIS — M51.36 DDD (DEGENERATIVE DISC DISEASE), LUMBAR: ICD-10-CM

## 2021-05-11 PROCEDURE — 3078F DIAST BP <80 MM HG: CPT | Performed by: FAMILY MEDICINE

## 2021-05-11 PROCEDURE — 99214 OFFICE O/P EST MOD 30 MIN: CPT | Performed by: FAMILY MEDICINE

## 2021-05-11 PROCEDURE — 1036F TOBACCO NON-USER: CPT | Performed by: FAMILY MEDICINE

## 2021-05-11 PROCEDURE — 3008F BODY MASS INDEX DOCD: CPT | Performed by: FAMILY MEDICINE

## 2021-05-11 PROCEDURE — 3725F SCREEN DEPRESSION PERFORMED: CPT | Performed by: FAMILY MEDICINE

## 2021-05-11 PROCEDURE — G0439 PPPS, SUBSEQ VISIT: HCPCS | Performed by: FAMILY MEDICINE

## 2021-05-11 PROCEDURE — 3074F SYST BP LT 130 MM HG: CPT | Performed by: FAMILY MEDICINE

## 2021-05-11 RX ORDER — LISINOPRIL 2.5 MG/1
2.5 TABLET ORAL DAILY
Qty: 90 TABLET | Refills: 2 | Status: SHIPPED | OUTPATIENT
Start: 2021-05-11 | End: 2021-08-18

## 2021-05-11 RX ORDER — MELOXICAM 15 MG/1
TABLET ORAL
Qty: 90 TABLET | Refills: 1 | Status: SHIPPED | OUTPATIENT
Start: 2021-05-11 | End: 2021-08-18 | Stop reason: SDUPTHER

## 2021-05-11 RX ORDER — ROSUVASTATIN CALCIUM 10 MG/1
10 TABLET, COATED ORAL DAILY
Qty: 90 TABLET | Refills: 3 | Status: SHIPPED | OUTPATIENT
Start: 2021-05-11 | End: 2022-05-25

## 2021-05-11 NOTE — PATIENT INSTRUCTIONS
Medicare Preventive Visit Patient Instructions  Thank you for completing your Welcome to Medicare Visit or Medicare Annual Wellness Visit today  Your next wellness visit will be due in one year (5/12/2022)  The screening/preventive services that you may require over the next 5-10 years are detailed below  Some tests may not apply to you based off risk factors and/or age  Screening tests ordered at today's visit but not completed yet may show as past due  Also, please note that scanned in results may not display below  Preventive Screenings:  Service Recommendations Previous Testing/Comments   Colorectal Cancer Screening  · Colonoscopy    · Fecal Occult Blood Test (FOBT)/Fecal Immunochemical Test (FIT)  · Fecal DNA/Cologuard Test  · Flexible Sigmoidoscopy Age: 54-65 years old   Colonoscopy: every 10 years (May be performed more frequently if at higher risk)  OR  FOBT/FIT: every 1 year  OR  Cologuard: every 3 years  OR  Sigmoidoscopy: every 5 years  Screening may be recommended earlier than age 48 if at higher risk for colorectal cancer  Also, an individualized decision between you and your healthcare provider will decide whether screening between the ages of 74-80 would be appropriate   Colonoscopy: 10/17/2009  FOBT/FIT: 12/17/2018  Cologuard: 02/17/2020  Sigmoidoscopy: Not on file    Screening Current     Prostate Cancer Screening Individualized decision between patient and health care provider in men between ages of 53-78   Medicare will cover every 12 months beginning on the day after your 50th birthday PSA: 1 9 ng/mL           Hepatitis C Screening Once for adults born between 1945 and 1965  More frequently in patients at high risk for Hepatitis C Hep C Antibody: 11/14/2019    Screening Current   Diabetes Screening 1-2 times per year if you're at risk for diabetes or have pre-diabetes Fasting glucose: 74 mg/dL   A1C: No results in last 5 years    Screening Current   Cholesterol Screening Once every 5 years if you don't have a lipid disorder  May order more often based on risk factors  Lipid panel: 05/26/2020    Screening Not Indicated  History Lipid Disorder      Other Preventive Screenings Covered by Medicare:  1  Abdominal Aortic Aneurysm (AAA) Screening: covered once if your at risk  You're considered to be at risk if you have a family history of AAA or a male between the age of 73-68 who smoking at least 100 cigarettes in your lifetime  2  Lung Cancer Screening: covers low dose CT scan once per year if you meet all of the following conditions: (1) Age 50-69; (2) No signs or symptoms of lung cancer; (3) Current smoker or have quit smoking within the last 15 years; (4) You have a tobacco smoking history of at least 30 pack years (packs per day x number of years you smoked); (5) You get a written order from a healthcare provider  3  Glaucoma Screening: covered annually if you're considered high risk: (1) You have diabetes OR (2) Family history of glaucoma OR (3)  aged 48 and older OR (3)  American aged 72 and older  3  Osteoporosis Screening: covered every 2 years if you meet one of the following conditions: (1) Have a vertebral abnormality; (2) On glucocorticoid therapy for more than 3 months; (3) Have primary hyperparathyroidism; (4) On osteoporosis medications and need to assess response to drug therapy  5  HIV Screening: covered annually if you're between the age of 12-76  Also covered annually if you are younger than 13 and older than 72 with risk factors for HIV infection  For pregnant patients, it is covered up to 3 times per pregnancy      Immunizations:  Immunization Recommendations   Influenza Vaccine Annual influenza vaccination during flu season is recommended for all persons aged >= 6 months who do not have contraindications   Pneumococcal Vaccine (Prevnar and Pneumovax)  * Prevnar = PCV13  * Pneumovax = PPSV23 Adults 25-60 years old: 1-3 doses may be recommended based on certain risk factors  Adults 72 years old: Prevnar (PCV13) vaccine recommended followed by Pneumovax (PPSV23) vaccine  If already received PPSV23 since turning 65, then PCV13 recommended at least one year after PPSV23 dose  Hepatitis B Vaccine 3 dose series if at intermediate or high risk (ex: diabetes, end stage renal disease, liver disease)   Tetanus (Td) Vaccine - COST NOT COVERED BY MEDICARE PART B Following completion of primary series, a booster dose should be given every 10 years to maintain immunity against tetanus  Td may also be given as tetanus wound prophylaxis  Tdap Vaccine - COST NOT COVERED BY MEDICARE PART B Recommended at least once for all adults  For pregnant patients, recommended with each pregnancy  Shingles Vaccine (Shingrix) - COST NOT COVERED BY MEDICARE PART B  2 shot series recommended in those aged 48 and above     Health Maintenance Due:      Topic Date Due    HIV Screening  Never done    Colorectal Cancer Screening  02/17/2023    Hepatitis C Screening  Completed     Immunizations Due:      Topic Date Due    COVID-19 Vaccine (1) Never done     Advance Directives   What are advance directives? Advance directives are legal documents that state your wishes and plans for medical care  These plans are made ahead of time in case you lose your ability to make decisions for yourself  Advance directives can apply to any medical decision, such as the treatments you want, and if you want to donate organs  What are the types of advance directives? There are many types of advance directives, and each state has rules about how to use them  You may choose a combination of any of the following:  · Living will: This is a written record of the treatment you want  You can also choose which treatments you do not want, which to limit, and which to stop at a certain time  This includes surgery, medicine, IV fluid, and tube feedings  · Durable power of  for healthcare [facility]):   This is a written record that states who you want to make healthcare choices for you when you are unable to make them for yourself  This person, called a proxy, is usually a family member or a friend  You may choose more than 1 proxy  · Do not resuscitate (DNR) order:  A DNR order is used in case your heart stops beating or you stop breathing  It is a request not to have certain forms of treatment, such as CPR  A DNR order may be included in other types of advance directives  · Medical directive: This covers the care that you want if you are in a coma, near death, or unable to make decisions for yourself  You can list the treatments you want for each condition  Treatment may include pain medicine, surgery, blood transfusions, dialysis, IV or tube feedings, and a ventilator (breathing machine)  · Values history: This document has questions about your views, beliefs, and how you feel and think about life  This information can help others choose the care that you would choose  Why are advance directives important? An advance directive helps you control your care  Although spoken wishes may be used, it is better to have your wishes written down  Spoken wishes can be misunderstood, or not followed  Treatments may be given even if you do not want them  An advance directive may make it easier for your family to make difficult choices about your care  Depression   Depression  is a medical condition that causes feelings of sadness or hopelessness that do not go away  Depression may cause you to lose interest in things you used to enjoy  These feelings may interfere with your daily life  Call your local emergency number (911 in the [address] [facility]) if:   · You think about harming yourself or someone else  · You have done something on purpose to hurt yourself    The following resources are available at any time to help you, if needed:   · [employer] [facility]: 4-087-748-960-552-6330 (5-626-904-ZDGQ)   · Suicide Hotline: 7-749-764-677-601-9371 (4-855-INWQWAS)   · For a list of international numbers: https://save org/find-help/international-resources/  Treatment for depression may include medicine to relieve depression  Medicine is often used together with therapy  Therapy is a way for you to talk about your feelings and anything that may be causing depression  Therapy can be done alone or in a group  It may also be done with family members or a significant other  · Get regular physical activity  · Create a regular sleep schedule  · Eat a variety of healthy foods  · Do not drink alcohol or use drugs  © Copyright [employer] 2018 Information is for End User's use only and may not be sold, redistributed or otherwise used for commercial purposes   All illustrations and images included in CareNotes® are the copyrighted property of A D A M , Inc  or [address] [place] [address]

## 2021-05-11 NOTE — PROGRESS NOTES
FAMILY PRACTICE OFFICE VISIT       NAME: Tejas Ortiz  AGE: 63 y o  SEX: male       : 1955        MRN: [id number]        Assessment and Plan     Problem List Items Addressed This Visit        Cardiovascular and Mediastinum    Hypertension - Primary    Relevant Medications    lisinopril (ZESTRIL) 2 5 mg tablet    Aneurysm of ascending aorta (HCC)     ·  [facility] follows            Nervous and Auditory    Complex partial seizure evolving to generalized seizure ([facility] [address] )     ·  [facility] follows, patient is on Keppra, no recurrences of seizures            Musculoskeletal and Integument    DDD (degenerative disc disease), lumbar     ·  Patient reports worsening of low back pain  · He has not been using medical marijuana or anti-inflammatory  · He will restart meloxicam 15 mg daily p r n  and will continue gabapentin  ·          Relevant Medications    meloxicam (MOBIC) 15 mg tablet       Other    Hyperlipidemia     ·  Zetia 10 mg daily, proceed with blood work         Relevant Medications    rosuvastatin (CRESTOR) 10 MG tablet    Depression, recurrent ([facility] [address] )     ·  Ongoing family related stress  · Patient declines prescription for antidepressants as his symptoms I entirely situational     · He is committed to safety and denies any symptoms of SI or HI           Other Visit Diagnoses     Chronic right-sided low back pain without sciatica        Relevant Orders    Urinalysis with microscopic (Completed)    Encounter for Medicare annual wellness exam              Patient Instructions       Medicare Preventive Visit Patient Instructions  Thank you for completing your Welcome to Medicare Visit or Medicare Annual Wellness Visit today  Your next wellness visit will be due in one year (2022)  The screening/preventive services that you may require over the next 5-10 years are detailed below  Some tests may not apply to you based off risk factors and/or age   Screening tests ordered at today's visit but not completed yet may show as past due  Also, please note that scanned in results may not display below  Preventive Screenings:  Service Recommendations Previous Testing/Comments   Colorectal Cancer Screening  · Colonoscopy    · Fecal Occult Blood Test (FOBT)/Fecal Immunochemical Test (FIT)  · Fecal DNA/Cologuard Test  · Flexible Sigmoidoscopy Age: 54-65 years old   Colonoscopy: every 10 years (May be performed more frequently if at higher risk)  OR  FOBT/FIT: every 1 year  OR  Cologuard: every 3 years  OR  Sigmoidoscopy: every 5 years  Screening may be recommended earlier than age 48 if at higher risk for colorectal cancer  Also, an individualized decision between you and your healthcare provider will decide whether screening between the ages of 74-80 would be appropriate  Colonoscopy: 10/17/2009  FOBT/FIT: 12/17/2018  Cologuard: 02/17/2020  Sigmoidoscopy: Not on file    Screening Current     Prostate Cancer Screening Individualized decision between patient and health care provider in men between ages of 53-78   Medicare will cover every 12 months beginning on the day after your 50th birthday PSA: 1 9 ng/mL           Hepatitis C Screening Once for adults born between 1945 and 1965  More frequently in patients at high risk for Hepatitis C Hep C Antibody: 11/14/2019    Screening Current   Diabetes Screening 1-2 times per year if you're at risk for diabetes or have pre-diabetes Fasting glucose: 74 mg/dL   A1C: No results in last 5 years    Screening Current   Cholesterol Screening Once every 5 years if you don't have a lipid disorder  May order more often based on risk factors  Lipid panel: 05/26/2020    Screening Not Indicated  History Lipid Disorder      Other Preventive Screenings Covered by Medicare:  1  Abdominal Aortic Aneurysm (AAA) Screening: covered once if your at risk   You're considered to be at risk if you have a family history of AAA or a male between the age of 73-68 who smoking at least 100 cigarettes in your lifetime  2  Lung Cancer Screening: covers low dose CT scan once per year if you meet all of the following conditions: (1) Age 50-69; (2) No signs or symptoms of lung cancer; (3) Current smoker or have quit smoking within the last 15 years; (4) You have a tobacco smoking history of at least 30 pack years (packs per day x number of years you smoked); (5) You get a written order from a healthcare provider  3  Glaucoma Screening: covered annually if you're considered high risk: (1) You have diabetes OR (2) Family history of glaucoma OR (3)  aged 48 and older OR (3)  American aged 72 and older  3  Osteoporosis Screening: covered every 2 years if you meet one of the following conditions: (1) Have a vertebral abnormality; (2) On glucocorticoid therapy for more than 3 months; (3) Have primary hyperparathyroidism; (4) On osteoporosis medications and need to assess response to drug therapy  5  HIV Screening: covered annually if you're between the age of 12-76  Also covered annually if you are younger than 13 and older than 72 with risk factors for HIV infection  For pregnant patients, it is covered up to 3 times per pregnancy  Immunizations:  Immunization Recommendations   Influenza Vaccine Annual influenza vaccination during flu season is recommended for all persons aged >= 6 months who do not have contraindications   Pneumococcal Vaccine (Prevnar and Pneumovax)  * Prevnar = PCV13  * Pneumovax = PPSV23 Adults 25-60 years old: 1-3 doses may be recommended based on certain risk factors  Adults 72 years old: Prevnar (PCV13) vaccine recommended followed by Pneumovax (PPSV23) vaccine  If already received PPSV23 since turning 65, then PCV13 recommended at least one year after PPSV23 dose     Hepatitis B Vaccine 3 dose series if at intermediate or high risk (ex: diabetes, end stage renal disease, liver disease)   Tetanus (Td) Vaccine - COST NOT COVERED BY MEDICARE PART B Following completion of primary series, a booster dose should be given every 10 years to maintain immunity against tetanus  Td may also be given as tetanus wound prophylaxis  Tdap Vaccine - COST NOT COVERED BY MEDICARE PART B Recommended at least once for all adults  For pregnant patients, recommended with each pregnancy  Shingles Vaccine (Shingrix) - COST NOT COVERED BY MEDICARE PART B  2 shot series recommended in those aged 48 and above     Health Maintenance Due:      Topic Date Due    HIV Screening  Never done    Colorectal Cancer Screening  02/17/2023    Hepatitis C Screening  Completed     Immunizations Due:      Topic Date Due    COVID-19 Vaccine (1) Never done     Advance Directives   What are advance directives? Advance directives are legal documents that state your wishes and plans for medical care  These plans are made ahead of time in case you lose your ability to make decisions for yourself  Advance directives can apply to any medical decision, such as the treatments you want, and if you want to donate organs  What are the types of advance directives? There are many types of advance directives, and each state has rules about how to use them  You may choose a combination of any of the following:  · Living will: This is a written record of the treatment you want  You can also choose which treatments you do not want, which to limit, and which to stop at a certain time  This includes surgery, medicine, IV fluid, and tube feedings  · Durable power of  for healthcare [place] [facility]): This is a written record that states who you want to make healthcare choices for you when you are unable to make them for yourself  This person, called a proxy, is usually a family member or a friend  You may choose more than 1 proxy  · Do not resuscitate (DNR) order:  A DNR order is used in case your heart stops beating or you stop breathing  It is a request not to have certain forms of treatment, such as CPR  A DNR order may be included in other types of advance directives  · Medical directive: This covers the care that you want if you are in a coma, near death, or unable to make decisions for yourself  You can list the treatments you want for each condition  Treatment may include pain medicine, surgery, blood transfusions, dialysis, IV or tube feedings, and a ventilator (breathing machine)  · Values history: This document has questions about your views, beliefs, and how you feel and think about life  This information can help others choose the care that you would choose  Why are advance directives important? An advance directive helps you control your care  Although spoken wishes may be used, it is better to have your wishes written down  Spoken wishes can be misunderstood, or not followed  Treatments may be given even if you do not want them  An advance directive may make it easier for your family to make difficult choices about your care  Depression   Depression  is a medical condition that causes feelings of sadness or hopelessness that do not go away  Depression may cause you to lose interest in things you used to enjoy  These feelings may interfere with your daily life  Call your local emergency number (911 in the [address] [place] [address],[address]) if:   · You think about harming yourself or someone else  · You have done something on purpose to hurt yourself  The following resources are available at any time to help you, if needed:   · [address] [place] [address]: 3-930.518.9738 (9-786-175-[facility])   · Suicide Hotline: 6-112.832.4673 (5-797-PCSTGXU)   · For a list of international numbers: https://save org/find-help/international-resources/  Treatment for depression may include medicine to relieve depression  Medicine is often used together with therapy  Therapy is a way for you to talk about your feelings and anything that may be causing depression  Therapy can be done alone or in a group   It may also be done with family members or a significant other  · Get regular physical activity  · Create a regular sleep schedule  · Eat a variety of healthy foods  · Do not drink alcohol or use drugs  © Copyright [employer] 2018 Information is for End User's use only and may not be sold, redistributed or otherwise used for commercial purposes  All illustrations and images included in CareNotes® are the copyrighted property of [employer]  or Ales[employer] Pal[employer]      Discussed with the patient and all questioned fully answered  He will call me if any problems arise  M*Modal software was used to dictate this note  It may contain errors with dictating incorrect words/spelling  Please contact provider directly with any questions  Chief Complaint     Chief Complaint   Patient presents with    Medicare Wellness Visit     AWV    Positive depression screening     PHQ9: 11       History of Present Illness     Patient presents for follow-up  He remains on daily medications for hypertension hyperlipidemia  Patient is bit confused about medication that he takes and feels that he might have ran out of a lot of refills  Chronic arthritic joint, back and hip pain  He feels depressed due to ongoing family related stress  Patient feels sad med he is not able to see his mother and daughter  Patient denies any suicidal homicidal ideation or thought and firmly declines any need for antidepressants  PHQ-9 performed today is 11, patient firmly refuses medications or counseling since his symptoms are situational and stress induced  Patient is complaining of intermittent right-sided low back pain  No sciatica  No symptoms of dysuria or abdominal pain  Pain comes and goes  He has been off meloxicam for a while  Patient has not been able to restart medical marijuana as his card has not arrived  Patient denies any recurrences of seizures    He reports that he has been compliant with Keppra and follows with [facility]  Patient has completed COVID-19 vaccination  Review of Systems   Review of Systems   Constitutional: Positive for fatigue  HENT: Negative  Eyes: Negative  Respiratory: Negative  Gastrointestinal: Negative  Endocrine: Negative  Genitourinary: Negative  Musculoskeletal: Positive for arthralgias, back pain and gait problem  Allergic/Immunologic: Negative  Neurological: Negative for headaches  Psychiatric/Behavioral: Positive for dysphoric mood         Active Problem List     Patient Active Problem List   Diagnosis    DDD (degenerative disc disease), cervical    Complex partial seizure evolving to generalized seizure ([address] )    Chronic pain disorder    DDD (degenerative disc disease), lumbar    Hyperlipidemia    Hypertension    Aneurysm of ascending aorta ([address] )    Polyneuropathy    Medical marijuana use    H/O total ankle replacement, right    History of failed arthroplasty of ankle    Depression, recurrent ([address] )       Past Medical History:  Past Medical History:   Diagnosis Date    Aneurysm, ascending aorta ([address] )     Anxiety disorder     Arthritis     Last assessed: 11/17/16    Asthma     DDD (degenerative disc disease), cervical     Depression     Hyperlipidemia     Hypertension     Multiple fractures of ribs, left side, init for clos fx 11/15/2020    Seizures ([address] )     Stroke syndrome        Past Surgical History:  Past Surgical History:   Procedure Laterality Date    ANKLE SURGERY      COLONOSCOPY  2009    Due 2014    HERNIA REPAIR      ROOT CANAL      TOTAL HIP ARTHROPLASTY Left 02/2014    TOTAL HIP ARTHROPLASTY Right        Family History:  Family History   Problem Relation Age of Onset    Anxiety disorder Mother         Symptom/disorder    Hypertension Mother         Benign Essential    Mitral valve prolapse Mother         Echo/Systolic    PABLO disease Mother     Fibromyalgia Mother     Hyperlipidemia Mother     Diabetes Father Mellitus    Cancer Brother     Stroke Family         CVA    Cancer Family     Sudden death Family         Instantaneous Cardiac Death    Other Family         Connective Tissue Defect       Social History:  Social History     Socioeconomic History    Marital status:      Spouse name: Not on file    Number of children: Not on file    Years of education: Bachelor's Degree    Highest education level: Not on file   Occupational History    Not on file   Social Needs    Financial resource strain: Not on file    Food insecurity     Worry: Not on file     Inability: Not on file   [employer] needs     Medical: Not on file     Non-medical: Not on file   Tobacco Use    Smoking status: Former Smoker    Smokeless tobacco: Never Used   Substance and Sexual Activity    Alcohol use: Not Currently     Frequency: Never     Comment: Occasionally    Drug use: Yes     Types: Marijuana     Comment: medical marijuana    Sexual activity: Not on file   Lifestyle    Physical activity     Days per week: Not on file     Minutes per session: Not on file    Stress: Not on file   Relationships    Social connections     Talks on phone: Not on file     Gets together: Not on file     Attends Congregation service: Not on file     Active member of club or organization: Not on file     Attends meetings of clubs or organizations: Not on file     Relationship status: Not on file    Intimate partner violence     Fear of current or ex partner: Not on file     Emotionally abused: Not on file     Physically abused: Not on file     Forced sexual activity: Not on file   Other Topics Concern    Not on file   Social History Narrative    Daily coffee consumption: 7 cups/day    Per [employer]: Currently            Objective     Vitals:    05/11/21 1026   BP: 122/78   BP Location: Left arm   Patient Position: Sitting   Cuff Size: Large   Pulse: 71   Resp: 16   Temp: 97 8 °F (36 6 °C)   TempSrc: Temporal   SpO2: 97%   Weight: 82 9 kg (182 lb 12 8 oz)   Height: 6' 2" (1 88 m)     Wt Readings from Last 3 Encounters:   05/11/21 82 9 kg (182 lb 12 8 oz)   12/04/20 74 6 kg (164 lb 6 4 oz)   11/25/20 76 9 kg (169 lb 9 6 oz)       Physical Exam  Vitals signs and nursing note reviewed  Constitutional:       General: He is not in acute distress  Appearance: Normal appearance  He is well-developed  He is not ill-appearing  HENT:      Head: Normocephalic and atraumatic  Eyes:      Conjunctiva/sclera: Conjunctivae normal    Neck:      Musculoskeletal: Neck supple  Vascular: No carotid bruit  Cardiovascular:      Rate and Rhythm: Normal rate and regular rhythm  Heart sounds: Normal heart sounds  No murmur  Pulmonary:      Effort: Pulmonary effort is normal  No respiratory distress  Breath sounds: Normal breath sounds  No wheezing or rales  Abdominal:      General: Bowel sounds are normal  There is no distension or abdominal bruit  Palpations: Abdomen is soft  Tenderness: There is no abdominal tenderness  There is no right CVA tenderness or left CVA tenderness  Musculoskeletal:      Right lower leg: No edema  Left lower leg: No edema  Comments: Antalgic gait  Patient ambulates slowly with a limp and assistance of cane  Paraspinal spasm L4-L5-L5-S1  Neurological:      General: No focal deficit present  Mental Status: He is alert and oriented to person, place, and time  Cranial Nerves: No cranial nerve deficit  Psychiatric:         Attention and Perception: Attention normal          Mood and Affect: Mood normal          Behavior: Behavior normal          Thought Content:  Thought content normal          Pertinent Laboratory/Diagnostic Studies:  Lab Results   Component Value Date    GLUCOSE 83 12/18/2015    BUN 21 05/13/2021    CREATININE 0 72 05/13/2021    CALCIUM 9 1 05/13/2021     12/18/2015    K 3 8 05/13/2021    CO2 32 05/13/2021     05/13/2021     Lab Results   Component Value Date    ALT 55 05/13/2021    AST 34 05/13/2021    ALKPHOS 67 05/13/2021    BILITOT 0 54 12/18/2015       Lab Results   Component Value Date    WBC 6 74 05/13/2021    HGB 14 3 05/13/2021    HCT 45 0 05/13/2021    MCV 96 05/13/2021     05/13/2021       No results found for: TSH    Lab Results   Component Value Date    CHOL 200 12/18/2015     Lab Results   Component Value Date    TRIG 109 05/13/2021     Lab Results   Component Value Date    HDL 49 05/13/2021     Lab Results   Component Value Date    LDLCALC 128 (H) 05/13/2021     Lab Results   Component Value Date    HGBA1C 4 9 03/18/2014       Results for orders placed or performed in visit on 05/11/21   Urinalysis with microscopic   Result Value Ref Range    Clarity, UA Cloudy     Color, UA Yellow     Specific [place], UA 1 015 1 003 - 1 030    pH, UA 6 5 4 5, 5 0, 5 5, 6 0, 6 5, 7 0, 7 5, 8 0    Glucose, UA Negative Negative mg/dl    Ketones, UA Negative Negative mg/dl    Blood, UA Small (A) Negative    Protein, UA Negative Negative mg/dl    Nitrite, UA Negative Negative    Bilirubin, UA Negative Negative    Urobilinogen, UA 0 2 0 2, 1 0 E U /dl E U /dl    Leukocytes, UA Negative Negative    WBC, UA 2-4 None Seen, 2-4 /hpf    RBC, UA None Seen None Seen, 2-4 /hpf    Bacteria, UA None Seen None Seen, Occasional /hpf    Epithelial Cells None Seen None Seen, Occasional /hpf       Orders Placed This Encounter   Procedures    Urinalysis with microscopic       ALLERGIES:  Allergies   Allergen Reactions    Gluten Meal - Food Allergy Other (See Comments)     UNKNOWN       Current Medications     Current Outpatient Medications   Medication Sig Dispense Refill    acetaminophen (TYLENOL) 325 mg tablet Take 3 tablets (975 mg total) by mouth every 8 (eight) hours 1 Bottle 0    aspirin 81 MG tablet Take 81 mg by mouth daily        CANNABIDIOL PO Take by mouth      gabapentin (NEURONTIN) 300 mg capsule TAKE ONE CAPSULE BY MOUTH EVERY MORNING, 1 CAPSULE MID DAY AND TAKE TWO CAPSULES BY MOUTH IN THE EVENING 120 capsule 5    levETIRAcetam (KEPPRA) 500 mg tablet TAKE TWO TABLETS BY MOUTH EVERY 12 HOURS 360 tablet 1    lisinopril (ZESTRIL) 2 5 mg tablet Take 1 tablet (2 5 mg total) by mouth daily 90 tablet 2    meloxicam (MOBIC) 15 mg tablet Take 1 tablet once a day after food as needed for low back pain 90 tablet 1    rosuvastatin (CRESTOR) 10 MG tablet Take 1 tablet (10 mg total) by mouth daily 90 tablet 3    Cholecalciferol (VITAMIN D3) 2000 units TABS Take 2,000 Units by mouth daily      Omega-3 Fatty Acids (FISH OIL) 1200 MG CAPS Take by mouth daily       No current facility-administered medications for this visit          Medications Discontinued During This Encounter   Medication Reason    meloxicam (MOBIC) 7 5 mg tablet Reorder    rosuvastatin (CRESTOR) 10 MG tablet Reorder    lisinopril (ZESTRIL) 2 5 mg tablet Reorder       Health Maintenance     Health Maintenance   Topic Date Due    SLP PLAN OF CARE  Never done    HIV Screening  Never done   [employer] Annual Wellness Visit (AWV)  11/14/2020    BMI: Adult  05/11/2022    Depression Remission PHQ  05/11/2022    Colorectal Cancer Screening  02/17/2023    DTaP,Tdap,and Td Vaccines (2 - Td) 11/14/2030    Hepatitis C Screening  Completed    Influenza Vaccine  Completed    COVID-19 Vaccine  Completed    Pneumococcal Vaccine: Pediatrics (0 to 5 Years) and At-Risk Patients (6 to 59 Years)  Aged Out    HIB Vaccine  Aged Out    Hepatitis B Vaccine  Aged Out    IPV Vaccine  Aged Out    Hepatitis A Vaccine  Aged Out    Meningococcal ACWY Vaccine  Aged Out    HPV Vaccine  Aged [employer] History   Administered Date(s) Administered    Influenza, recombinant, quadrivalent,injectable, preservative free 11/11/2020    Influenza, seasonal, injectable 10/09/2014, 09/16/2015    SARS-CoV-2 / COVID-19 mRNA IM ([place]) 03/25/2021, 04/22/2021    Tdap 11/14/2020    influenza, injectable, quadrivalent 10/29/2019       Lizbeth Grimes MD

## 2021-05-11 NOTE — PROGRESS NOTES
Assessment and Plan:     Problem List Items Addressed This Visit        Cardiovascular and Mediastinum    Hypertension - Primary    Relevant Medications    lisinopril (ZESTRIL) 2 5 mg tablet    Aneurysm of ascending aorta (HCC)     ·  [facility] follows            Nervous and Auditory    Complex partial seizure evolving to generalized seizure ([facility] [address] )     ·  [facility] follows, patient is on Keppra, no recurrences of seizures            Musculoskeletal and Integument    DDD (degenerative disc disease), lumbar     ·  Patient reports worsening of low back pain  · He has not been using medical marijuana or anti-inflammatory  · He will restart meloxicam 15 mg daily p r n  and will continue gabapentin  Relevant Medications    meloxicam (MOBIC) 15 mg tablet       Other    Hyperlipidemia     ·  Zetia 10 mg daily, proceed with blood work         Relevant Medications    rosuvastatin (CRESTOR) 10 MG tablet    Depression, recurrent ([facility] [address] )     ·  Ongoing family related stress  · Patient declines prescription for antidepressants as his symptoms I entirely situational     · He is committed to safety and denies any symptoms of SI or HI           Other Visit Diagnoses     Chronic right-sided low back pain without sciatica        Relevant Orders    Urinalysis with microscopic (Completed)    Encounter for Medicare annual wellness exam               Preventive health issues were discussed with patient, and age appropriate screening tests were ordered as noted in patient's After Visit Summary  Personalized health advice and appropriate referrals for health education or preventive services given if needed, as noted in patient's After Visit Summary       History of Present Illness:     Patient presents for Medicare Annual Wellness visit    Patient Care Team:  Mango Gan MD as PCP - General  Mango Gan MD as PCP - [address] [place], [place] [address]Th [facility] ([facility])  SRIRAM Camarillo MD Wendy Lance, MD Jenny Alves, DO Landry Gracia, MD Darrell Orosco,      Problem List:     Patient Active Problem List   Diagnosis    DDD (degenerative disc disease), cervical    Complex partial seizure evolving to generalized seizure ([facility] [address] )    Chronic pain disorder    DDD (degenerative disc disease), lumbar    Hyperlipidemia    Hypertension    Aneurysm of ascending aorta ([facility] [address] )    Polyneuropathy    Medical marijuana use    H/O total ankle replacement, right    History of failed arthroplasty of ankle    Depression, recurrent ([facility] [address] )      Past Medical and Surgical History:     Past Medical History:   Diagnosis Date    Aneurysm, ascending aorta ([facility] [address] )     Anxiety disorder     Arthritis     Last assessed: 11/17/16    Asthma     DDD (degenerative disc disease), cervical     Depression     Hyperlipidemia     Hypertension     Multiple fractures of ribs, left side, init for clos fx 11/15/2020    Seizures ([facility] [address] )     Stroke syndrome      Past Surgical History:   Procedure Laterality Date    ANKLE SURGERY      COLONOSCOPY  2009 Due 2014    HERNIA REPAIR      ROOT CANAL      TOTAL HIP ARTHROPLASTY Left 02/2014    TOTAL HIP ARTHROPLASTY Right       Family History:     Family History   Problem Relation Age of Onset    Anxiety disorder Mother         Symptom/disorder    Hypertension Mother         Benign Essential    Mitral valve prolapse Mother         Echo/Systolic    PABLO disease Mother     Fibromyalgia Mother     Hyperlipidemia Mother     Diabetes Father         Mellitus    Cancer Brother     Stroke Family         CVA    Cancer Family     Sudden death Family         Instantaneous Cardiac Death    Other Family         Connective Tissue Defect      Social History:     E-Cigarette/Vaping    E-Cigarette Use Former User     Quit Date 1/1/11     Comments Quit  in 2011      E-Cigarette/Vaping Substances    Nicotine No     THC No     CBD No     Flavoring No     Other No     Unknown No      Social History     Socioeconomic History    Marital status:      Spouse name: None    Number of children: None    Years of education: Bachelor's Degree    Highest education level: None   Occupational History    None   Social Needs    Financial resource strain: None    Food insecurity     Worry: None     Inability: None    Transportation needs     Medical: None     Non-medical: None   Tobacco Use    Smoking status: Former Smoker    Smokeless tobacco: Never Used   Substance and Sexual Activity    Alcohol use: Not Currently     Frequency: Never     Comment: Occasionally    Drug use: Yes     Types: Marijuana     Comment: medical marijuana    Sexual activity: None   Lifestyle    Physical activity     Days per week: None     Minutes per session: None    Stress: None   Relationships    Social connections     Talks on phone: None     Gets together: None     Attends Yarsani service: None     Active member of club or organization: None     Attends meetings of clubs or organizations: None     Relationship status: None    Intimate partner violence     Fear of current or ex partner: None     Emotionally abused: None     Physically abused: None     Forced sexual activity: None   Other Topics Concern    None   Social History Narrative    Daily coffee consumption: 7 cups/day    Per [employer]: Currently       Medications and Allergies:     Current Outpatient Medications   Medication Sig Dispense Refill    acetaminophen (TYLENOL) 325 mg tablet Take 3 tablets (975 mg total) by mouth every 8 (eight) hours 1 Bottle 0    aspirin 81 MG tablet Take 81 mg by mouth daily        CANNABIDIOL PO Take by mouth      gabapentin (NEURONTIN) 300 mg capsule TAKE ONE CAPSULE BY MOUTH EVERY MORNING, 1 CAPSULE MID DAY AND TAKE TWO CAPSULES BY MOUTH IN THE EVENING 120 capsule 5    levETIRAcetam (KEPPRA) 500 mg tablet TAKE TWO TABLETS BY MOUTH EVERY 12 HOURS 360 tablet 1    lisinopril (ZESTRIL) 2 5 mg tablet Take 1 tablet (2 5 mg total) by mouth daily 90 tablet 2    meloxicam (MOBIC) 15 mg tablet Take 1 tablet once a day after food as needed for low back pain 90 tablet 1    rosuvastatin (CRESTOR) 10 MG tablet Take 1 tablet (10 mg total) by mouth daily 90 tablet 3    Cholecalciferol (VITAMIN D3) 2000 units TABS Take 2,000 Units by mouth daily      Omega-3 Fatty Acids (FISH OIL) 1200 MG CAPS Take by mouth daily       No current facility-administered medications for this visit  Allergies   Allergen Reactions    Gluten Meal - Food Allergy Other (See Comments)     UNKNOWN      Immunizations:     Immunization History   Administered Date(s) Administered    Influenza, recombinant, quadrivalent,injectable, preservative free 11/11/2020    Influenza, seasonal, injectable 10/09/2014, 09/16/2015    SARS-CoV-2 / COVID-19 mRNA IM ([facility]ard) 03/25/2021, 04/22/2021    Tdap 11/14/2020    influenza, injectable, quadrivalent 10/29/2019      Health Maintenance:         Topic Date Due    HIV Screening  Never done    Colorectal Cancer Screening  02/17/2023    Hepatitis C Screening  Completed     There are no preventive care reminders to display for this patient  Medicare Health Risk Assessment:     /78 (BP Location: Left arm, Patient Position: Sitting, Cuff Size: Large)   Pulse 71   Temp 97 8 °F (36 6 °C) (Temporal)   Resp 16   Ht 6' 2" (1 88 m)   Wt 82 9 kg (182 lb 12 8 oz)   SpO2 97%   BMI 23 47 kg/m²      Zeb Leiva is here for his Subsequent Wellness visit  Last Medicare Wellness visit information reviewed, patient interviewed and updates made to the record today  Health Risk Assessment:   Patient rates overall health as good  Patient feels that their physical health rating is slightly better  Patient is very dissatisfied with their life  Eyesight was rated as slightly worse  Hearing was rated as same   Patient feels that their emotional and mental health rating is same  Patients states they are sometimes angry  Patient states they are often unusually tired/fatigued  Pain experienced in the last 7 days has been none  Patient states that he has experienced no weight loss or gain in last 6 months  Depression Screening:   PHQ-2 Score: 4  PHQ-9 Score: 11      Fall Risk Screening: In the past year, patient has experienced: history of falling in past year    Number of falls: 2 or more  Injured during fall?: Yes    Feels unsteady when standing or walking?: Yes    Worried about falling?: Yes      Home Safety:  Patient has trouble with stairs inside or outside of their home  Patient has working smoke alarms and has no working carbon monoxide detector  Home safety hazards include: poor household lighting  Nutrition:   Current diet is Regular  Medications:   Patient is currently taking over-the-counter supplements  OTC medications include: see medication list  Patient is able to manage medications  Activities of Daily Living (ADLs)/Instrumental Activities of Daily Living (IADLs):   Walk and transfer into and out of bed and chair?: Yes  Dress and groom yourself?: Yes    Bathe or shower yourself?: Yes    Feed yourself?  Yes  Do your laundry/housekeeping?: Yes  Manage your money, pay your bills and track your expenses?: Yes  Make your own meals?: Yes    Do your own shopping?: Yes    Previous Hospitalizations:   Any hospitalizations or ED visits within the last 12 months?: Yes      Advance Care Planning:   Living will: No    Durable POA for healthcare: No    Advanced directive: No    Advanced directive counseling given: Yes    Five wishes given: Yes      Cognitive Screening:   Provider or family/friend/caregiver concerned regarding cognition?: No    PREVENTIVE SCREENINGS      Cardiovascular Screening:    General: Screening Not Indicated and History Lipid Disorder      Diabetes Screening:     General: Screening Current      Colorectal Cancer Screening:     General: Screening Current      Prostate Cancer Screening:    General: Screening Current      Osteoporosis Screening:    General: Screening Not Indicated      Abdominal Aortic Aneurysm (AAA) Screening:    Risk factors include: tobacco use        Lung Cancer Screening:     General: Screening Not Indicated      Hepatitis C Screening:    General: Screening Current    Screening, Brief Intervention, and Referral to Treatment (SBIRT)    Screening  Typical number of drinks in a day: 0  Typical number of drinks in a week: 0  Interpretation: Low risk drinking behavior  AUDIT-C Screenin) How often did you have a drink containing alcohol in the past year? never  2) How many drinks did you have on a typical day when you were drinking in the past year? never  3) How often did you have 6 or more drinks on one occasion in the past year? never    AUDIT-C Score: 0  Interpretation: Score 0-3 (male): Negative screen for alcohol misuse    Single Item Drug Screening:  How often have you used an illegal drug (including marijuana) or a prescription medication for non-medical reasons in the past year? never    Single Item Drug Screen Score: 0  Interpretation: Negative screen for possible drug use disorder    Brief Intervention  Alcohol & drug use screenings were reviewed  No concerns regarding substance use disorder identified  Other Counseling Topics:   Regular weightbearing exercise         Hayden Salas MD

## 2021-05-13 ENCOUNTER — APPOINTMENT (OUTPATIENT)
Dept: LAB | Facility: CLINIC | Age: 66
End: 2021-05-13
Payer: COMMERCIAL

## 2021-05-13 DIAGNOSIS — Z12.5 ENCOUNTER FOR PROSTATE CANCER SCREENING: ICD-10-CM

## 2021-05-13 DIAGNOSIS — I10 ESSENTIAL HYPERTENSION: ICD-10-CM

## 2021-05-13 DIAGNOSIS — E78.5 HYPERLIPIDEMIA, UNSPECIFIED HYPERLIPIDEMIA TYPE: ICD-10-CM

## 2021-05-13 DIAGNOSIS — G89.4 CHRONIC PAIN DISORDER: ICD-10-CM

## 2021-05-13 LAB
ALBUMIN SERPL BCP-MCNC: 4 G/DL (ref 3.5–5)
ALP SERPL-CCNC: 67 U/L (ref 46–116)
ALT SERPL W P-5'-P-CCNC: 55 U/L (ref 12–78)
ANION GAP SERPL CALCULATED.3IONS-SCNC: 4 MMOL/L (ref 4–13)
AST SERPL W P-5'-P-CCNC: 34 U/L (ref 5–45)
BACTERIA UR QL AUTO: ABNORMAL /HPF
BILIRUB SERPL-MCNC: 0.4 MG/DL (ref 0.2–1)
BILIRUB UR QL STRIP: NEGATIVE
BUN SERPL-MCNC: 21 MG/DL (ref 5–25)
CALCIUM SERPL-MCNC: 9.1 MG/DL (ref 8.3–10.1)
CHLORIDE SERPL-SCNC: 102 MMOL/L (ref 100–108)
CHOLEST SERPL-MCNC: 199 MG/DL (ref 50–200)
CLARITY UR: ABNORMAL
CO2 SERPL-SCNC: 32 MMOL/L (ref 21–32)
COLOR UR: YELLOW
CREAT SERPL-MCNC: 0.72 MG/DL (ref 0.6–1.3)
ERYTHROCYTE [DISTWIDTH] IN BLOOD BY AUTOMATED COUNT: 12.5 % (ref 11.6–15.1)
GFR SERPL CREATININE-BSD FRML MDRD: 99 ML/MIN/1.73SQ M
GLUCOSE SERPL-MCNC: 88 MG/DL (ref 65–140)
GLUCOSE UR STRIP-MCNC: NEGATIVE MG/DL
HCT VFR BLD AUTO: 45 % (ref 36.5–49.3)
HDLC SERPL-MCNC: 49 MG/DL
HGB BLD-MCNC: 14.3 G/DL (ref 12–17)
HGB UR QL STRIP.AUTO: ABNORMAL
KETONES UR STRIP-MCNC: NEGATIVE MG/DL
LDLC SERPL CALC-MCNC: 128 MG/DL (ref 0–100)
LEUKOCYTE ESTERASE UR QL STRIP: NEGATIVE
MCH RBC QN AUTO: 30.4 PG (ref 26.8–34.3)
MCHC RBC AUTO-ENTMCNC: 31.8 G/DL (ref 31.4–37.4)
MCV RBC AUTO: 96 FL (ref 82–98)
NITRITE UR QL STRIP: NEGATIVE
NON-SQ EPI CELLS URNS QL MICRO: ABNORMAL /HPF
PH UR STRIP.AUTO: 6.5 [PH]
PLATELET # BLD AUTO: 263 THOUSANDS/UL (ref 149–390)
PMV BLD AUTO: 10.5 FL (ref 8.9–12.7)
POTASSIUM SERPL-SCNC: 3.8 MMOL/L (ref 3.5–5.3)
PROT SERPL-MCNC: 7.6 G/DL (ref 6.4–8.2)
PROT UR STRIP-MCNC: NEGATIVE MG/DL
PSA SERPL-MCNC: 2 NG/ML (ref 0–4)
RBC # BLD AUTO: 4.7 MILLION/UL (ref 3.88–5.62)
RBC #/AREA URNS AUTO: ABNORMAL /HPF
SODIUM SERPL-SCNC: 138 MMOL/L (ref 136–145)
SP GR UR STRIP.AUTO: 1.01 (ref 1–1.03)
TRIGL SERPL-MCNC: 109 MG/DL
TSH SERPL DL<=0.05 MIU/L-ACNC: 3.05 UIU/ML (ref 0.36–3.74)
UROBILINOGEN UR QL STRIP.AUTO: 0.2 E.U./DL
WBC # BLD AUTO: 6.74 THOUSAND/UL (ref 4.31–10.16)
WBC #/AREA URNS AUTO: ABNORMAL /HPF

## 2021-05-13 PROCEDURE — 36415 COLL VENOUS BLD VENIPUNCTURE: CPT

## 2021-05-13 PROCEDURE — 80061 LIPID PANEL: CPT

## 2021-05-13 PROCEDURE — 85027 COMPLETE CBC AUTOMATED: CPT

## 2021-05-13 PROCEDURE — 80053 COMPREHEN METABOLIC PANEL: CPT

## 2021-05-13 PROCEDURE — 84443 ASSAY THYROID STIM HORMONE: CPT

## 2021-05-13 PROCEDURE — G0103 PSA SCREENING: HCPCS

## 2021-05-13 PROCEDURE — 81001 URINALYSIS AUTO W/SCOPE: CPT | Performed by: FAMILY MEDICINE

## 2021-05-16 PROBLEM — S22.42XA MULTIPLE FRACTURES OF RIBS, LEFT SIDE, INIT FOR CLOS FX: Status: RESOLVED | Noted: 2020-11-15 | Resolved: 2021-05-16

## 2021-05-16 NOTE — ASSESSMENT & PLAN NOTE
Hospital Medicine Daily Progress Note    Date of Service  4/11/2019    Chief Complaint  58 y.o. male admitted 4/9/2019 with shortness of breath.    Hospital Course   Mr. Davies has a history of dyslipidemia, DM, and hypertension that underwent a right knee arthroplasty on 4/8 by Dr. Martin in [place].  Preoperatively, he developed pulmonary edema and shortness of breath therefore an echocardiogram was done which revealed severe mitral regurgitation which was new compared to previous echocardiogram 2017.  He was transferred here for higher level of care.  As his respiratory status has decompensated, decision was made to transfer him to the intensive care unit.  Is been seen by cardiothoracic surgery and likely will go to the operating room after a heart catheterization is done to decide whether he also needs a bypass graft.      Interval Problem Update  4/10: patient seen in the ICU.  Patient notes that he is not able to lay down flat without becoming quite short of breath.  He understands that he will need to go to the cardiac Cath Lab prior to valve surgery.   4/11: Patient seen and evaluated in the intensive care unit.  He had a cardiac catheterization yesterday which did not show evidence of coronary disease. He is on an amiodarone drip and heparin drip due to atrial fibrillation.  I discussed with Dr. Dye about probable surgery tomorrow. I called Dr. Martin and discussed ROM with him today.  Consultants/Specialty  Cardiology. I discussed with Dr. Gurwinder Dye, Cardiothoracic surgery  Critical Care. I discussed with Dr. Gonda on ICU Hot Rounds  Code Status  full    Disposition  CIC    Review of Systems  Review of Systems   Constitutional: Negative for chills and fever.   Respiratory: Positive for cough and shortness of breath.    Cardiovascular: Positive for leg swelling. Negative for chest pain.   Musculoskeletal:        Right knee pain with movement  Right knee is stiff    All other systems  ·  [facility]'s Neurology follows, patient is on Keppra, no recurrences of seizures reviewed and are negative.       Physical Exam  Temp:  [36.1 °C (96.9 °F)-38 °C (100.4 °F)] 36.2 °C (97.1 °F)  Pulse:  [] 79  Resp:  [15-26] 24  BP: (143-151)/(92-97) 151/97  SpO2:  [91 %-97 %] 93 %    Physical Exam   Constitutional: He is oriented to person, place, and time. He appears well-developed and well-nourished. No distress.   HENT:   Head: Atraumatic.   Neck: Normal range of motion. Neck supple.   Cardiovascular: Normal rate and regular rhythm.    V/VI DIPTI left sternal border.    Pulmonary/Chest: Effort normal. No respiratory distress. He has no wheezes.   Abdominal: Soft. He exhibits no distension. There is no tenderness.   Musculoskeletal: He exhibits no edema.   Left leg is wrapped.   Neurological: He is alert and oriented to person, place, and time.   Skin: Skin is warm. He is not diaphoretic.   Psychiatric: He has a normal mood and affect. His behavior is normal.   Nursing note and vitals reviewed.      Fluids    Intake/Output Summary (Last 24 hours) at 04/11/19 0657  Last data filed at 04/11/19 0600   Gross per 24 hour   Intake          2009.47 ml   Output             4245 ml   Net         -2235.53 ml       Laboratory  Recent Labs      04/10/19   0224   WBC  14.8*   RBC  4.75   HEMOGLOBIN  12.5*   HEMATOCRIT  40.3*   MCV  84.8   MCH  26.3*   MCHC  31.0*   RDW  43.8   PLATELETCT  222   MPV  10.2     Recent Labs      04/10/19   0224  04/10/19   1329  04/10/19   1921  04/11/19   0116   SODIUM  138   --    --   139   POTASSIUM  3.5*  3.0*  3.0*  3.1*   CHLORIDE  105   --    --   99   CO2  22   --    --   29   GLUCOSE  183*   --    --   189*   BUN  24*   --    --   23*   CREATININE  1.18   --    --   1.09   CALCIUM  7.9*   --    --   7.4*     Recent Labs      04/10/19   2116  04/11/19   0350   APTT  35.8  45.6*     Recent Labs      04/10/19   0224   BNPBTYPENAT  240*           Imaging  EC-ECHOCARDIOGRAM COMPLETE W/O CONT   Final Result      CT-CTA CHEST PULMONARY ARTERY W/ RECONS   Final Result       1.  Patchy groundglass opacification with septal thickening, perihilar in distribution. Findings are nonspecific and may represent pulmonary edema or an infectious/inflammatory process.      2.  Small left pleural effusion.      3.  Cardiomegaly.      4.  Mild nonspecific mediastinal adenopathy            DX-CHEST-LIMITED (1 VIEW)   Final Result      1.  Cardiomegaly.      2.  Increased perihilar interstitial markings with left basilar and right upper lobe hazy opacification. Findings may be related to pulmonary edema. Typical infection could have a similar appearance.      EC-MICAH W/O CONT    (Results Pending)   CL-LEFT HEART CATHETERIZATION WITH POSSIBLE INTERVENTION    (Results Pending)        Assessment/Plan  * Acute respiratory failure with hypoxia with pulmonary edema (HCC)   Assessment & Plan    Secondary to severe mitral regurgitation  He is requiring supplemental oxygen and critical care is consulted  IV diuresis as tolerated and follow his basic metabolic panel with electrolytes in the morning as well as renal function and urine output  Supplemental oxygen as indicated     Mitral valve disease   Assessment & Plan    Severe mitral regurgitation noted on echocardiogram  Clear coronaries on cardiac cath.  Mitral valve surgery planned on 4/12 by Dr. Dye      Obstructive sleep apnea   Assessment & Plan    Resume home CPAP     S/P total knee arthroplasty, right   Assessment & Plan    Status post right knee arthroplasty by Dr. Andino, orthopedics in [facility]  Oral pain medications on is n.p.o. IV morphine for breakthrough pain  I called Dr. Andino on 4/11 and he recommends PT for 15 minutes of ROM as tolerated at least BID; CPM is not indicated.      Pulmonary edema   Assessment & Plan    Postoperative pulmonary edema  Ordered Lasix  Cardiology consulted     Hypothyroid- (present on admission)   Assessment & Plan    High dose Synthroid 300 mcg outpatient  TSH is suppressed at <0.005  Decrease dose  to 175 mcg daily.        Atrial fibrillation (HCC)   Assessment & Plan    Paroxysmal atrial fibrillation.  IV heparin drip for anticoagulation and titrate PTTs  IV amiodarone drip for rate/rhythm control.      Volume overload   Assessment & Plan    IV Lasix  Potassium scale ordered for replacement          VTE prophylaxis:

## 2021-05-16 NOTE — ASSESSMENT & PLAN NOTE
·  Ongoing family related stress      · Patient declines prescription for antidepressants as his symptoms I entirely situational     · He is committed to safety and denies any symptoms of SI or HI

## 2021-05-16 NOTE — ASSESSMENT & PLAN NOTE
·  Patient reports worsening of low back pain  · He has not been using medical marijuana or anti-inflammatory  · He will restart meloxicam 15 mg daily p r n  and will continue gabapentin

## 2021-05-18 ENCOUNTER — TELEPHONE (OUTPATIENT)
Dept: FAMILY MEDICINE CLINIC | Facility: CLINIC | Age: 66
End: 2021-05-18

## 2021-05-18 DIAGNOSIS — G89.4 CHRONIC PAIN DISORDER: Primary | ICD-10-CM

## 2021-05-18 NOTE — TELEPHONE ENCOUNTER
Patient made aware of result note and providers instructions  Patient verbalized understanding       Patient states he will go tomorrow

## 2021-05-18 NOTE — TELEPHONE ENCOUNTER
Please contact patient  Blood work is normal/ stable  Urinalysis reveals minimal/trace of blood  Please ask him to stop by at the LAB  for recheck urinalysis  I will place orders    Thanks

## 2021-05-24 ENCOUNTER — APPOINTMENT (OUTPATIENT)
Dept: LAB | Facility: CLINIC | Age: 66
End: 2021-05-24
Payer: COMMERCIAL

## 2021-05-24 LAB
BACTERIA UR QL AUTO: NORMAL /HPF
BILIRUB UR QL STRIP: NEGATIVE
CLARITY UR: CLEAR
COLOR UR: ABNORMAL
GLUCOSE UR STRIP-MCNC: NEGATIVE MG/DL
HGB UR QL STRIP.AUTO: ABNORMAL
KETONES UR STRIP-MCNC: NEGATIVE MG/DL
LEUKOCYTE ESTERASE UR QL STRIP: NEGATIVE
NITRITE UR QL STRIP: NEGATIVE
NON-SQ EPI CELLS URNS QL MICRO: NORMAL /HPF
PH UR STRIP.AUTO: 6 [PH]
PROT UR STRIP-MCNC: NEGATIVE MG/DL
RBC #/AREA URNS AUTO: NORMAL /HPF
SP GR UR STRIP.AUTO: 1.03 (ref 1–1.03)
UROBILINOGEN UR QL STRIP.AUTO: 1 E.U./DL
WBC #/AREA URNS AUTO: NORMAL /HPF

## 2021-05-24 PROCEDURE — 81001 URINALYSIS AUTO W/SCOPE: CPT | Performed by: FAMILY MEDICINE

## 2021-08-04 ENCOUNTER — TELEPHONE (OUTPATIENT)
Dept: NEUROLOGY | Facility: CLINIC | Age: 66
End: 2021-08-04

## 2021-08-05 NOTE — PROGRESS NOTES
Patient ID: Laron Lind is a 61 y o  male with prior stroke and epilepsy, who is returning to Neurology office for follow up of his seizures  Assessment/Plan:    Complex partial seizure evolving to generalized seizure ([address] )   He has not had any additional seizures since his last appointment has been tolerating levetiracetam well without any significant side effects  Will be important for him to continue this unchanged given his prior history of stroke  -- he will continue levetiracetam 1000 mg twice a day  If he would have any additional seizures, his dose could be increased    Tremor   He has been noticing a slight intermittent tremor in his bilateral hands  This is most prominent when he is doing something, but has not yet been problematic  He does not have any other associated symptoms and does not have any significant tremor in the office today  I discussed this certainly could represent early type of tremor such as essential tremor but may also represent enhance physiologic tremor  We discussed triggers like caffeine use or anxiety  We will continue to follow the tremor over time and if would become more problematic, we could consider symptomatic treatment  He will Return in about 1 year (around 8/9/2022)  Subjective:    HPI  Current seizure medications:  1  Levetiracetam 1000 mg twice a day  Other medications as per Epic  Since his last visit, he denies any additional seizures  He has been tolerating levetiracetam well without any side effects  He does notice over the last few weeks, he has had some slight intermittent left hand tremor  This is very mild and does not interfere with his ability to function  He overall feels this is mild and not bothersome  Prior Seizure Medications: Phenytoin (stopped in the late 80s due to prolonged period of seizure freedom)       Objective:    Blood pressure 106/65, pulse 74, weight 81 8 kg (180 lb 6 4 oz)      Physical Exam    Neurological Exam      ROS:    Review of Systems  Review of Systems   Constitutional: Negative  Negative for appetite change and fever  HENT: Negative  Negative for hearing loss, tinnitus, trouble swallowing and voice change  Eyes: Negative  Negative for photophobia and pain  Respiratory: Negative  Negative for shortness of breath  Cardiovascular: Negative  Negative for palpitations  Gastrointestinal: Negative  Negative for nausea and vomiting  Endocrine: Negative  Negative for cold intolerance  Genitourinary: Negative  Negative for dysuria, frequency and urgency  Musculoskeletal: Positive for gait problem  Negative for myalgias and neck pain  Skin: Negative  Negative for rash  Neurological: Positive for tremors (left side)  Negative for dizziness, seizures, syncope, facial asymmetry, speech difficulty, weakness, light-headedness, numbness and headaches  Hematological: Negative  Does not bruise/bleed easily  Psychiatric/Behavioral: Negative  Negative for confusion, hallucinations and sleep disturbance  All other systems reviewed and are negative      I personally reviewed the ROS that was entered by the medical assistant

## 2021-08-09 ENCOUNTER — OFFICE VISIT (OUTPATIENT)
Dept: NEUROLOGY | Facility: CLINIC | Age: 66
End: 2021-08-09
Payer: COMMERCIAL

## 2021-08-09 VITALS
HEART RATE: 74 BPM | BODY MASS INDEX: 23.16 KG/M2 | DIASTOLIC BLOOD PRESSURE: 65 MMHG | WEIGHT: 180.4 LBS | SYSTOLIC BLOOD PRESSURE: 106 MMHG

## 2021-08-09 DIAGNOSIS — R25.1 TREMOR: Primary | ICD-10-CM

## 2021-08-09 DIAGNOSIS — G40.209 COMPLEX PARTIAL SEIZURE EVOLVING TO GENERALIZED SEIZURE (HCC): ICD-10-CM

## 2021-08-09 PROCEDURE — 99214 OFFICE O/P EST MOD 30 MIN: CPT | Performed by: PSYCHIATRY & NEUROLOGY

## 2021-08-09 RX ORDER — LEVETIRACETAM 500 MG/1
1000 TABLET ORAL EVERY 12 HOURS
Qty: 360 TABLET | Refills: 3 | Status: SHIPPED | OUTPATIENT
Start: 2021-08-09 | End: 2022-08-10

## 2021-08-09 NOTE — PROGRESS NOTES
Review of Systems   Constitutional: Negative  Negative for appetite change and fever  HENT: Negative  Negative for hearing loss, tinnitus, trouble swallowing and voice change  Eyes: Negative  Negative for photophobia and pain  Respiratory: Negative  Negative for shortness of breath  Cardiovascular: Negative  Negative for palpitations  Gastrointestinal: Negative  Negative for nausea and vomiting  Endocrine: Negative  Negative for cold intolerance  Genitourinary: Negative  Negative for dysuria, frequency and urgency  Musculoskeletal: Positive for gait problem  Negative for myalgias and neck pain  Skin: Negative  Negative for rash  Neurological: Positive for tremors (left side)  Negative for dizziness, seizures, syncope, facial asymmetry, speech difficulty, weakness, light-headedness, numbness and headaches  Hematological: Negative  Does not bruise/bleed easily  Psychiatric/Behavioral: Negative  Negative for confusion, hallucinations and sleep disturbance  All other systems reviewed and are negative

## 2021-08-09 NOTE — PATIENT INSTRUCTIONS
-- Continue to take Levetiracetam 1000 mg twice a day  -- if you have any problems or if your tremor would worsen, please give our office a call

## 2021-08-10 PROBLEM — R25.1 TREMOR: Status: ACTIVE | Noted: 2021-08-10

## 2021-08-11 NOTE — ASSESSMENT & PLAN NOTE
He has not had any additional seizures since his last appointment has been tolerating levetiracetam well without any significant side effects  Will be important for him to continue this unchanged given his prior history of stroke  -- he will continue levetiracetam 1000 mg twice a day    If he would have any additional seizures, his dose could be increased

## 2021-08-11 NOTE — ASSESSMENT & PLAN NOTE
He has been noticing a slight intermittent tremor in his bilateral hands  This is most prominent when he is doing something, but has not yet been problematic  He does not have any other associated symptoms and does not have any significant tremor in the office today  I discussed this certainly could represent early type of tremor such as essential tremor but may also represent enhance physiologic tremor  We discussed triggers like caffeine use or anxiety  We will continue to follow the tremor over time and if would become more problematic, we could consider symptomatic treatment

## 2021-08-18 ENCOUNTER — OFFICE VISIT (OUTPATIENT)
Dept: FAMILY MEDICINE CLINIC | Facility: CLINIC | Age: 66
End: 2021-08-18
Payer: COMMERCIAL

## 2021-08-18 VITALS
OXYGEN SATURATION: 98 % | SYSTOLIC BLOOD PRESSURE: 112 MMHG | HEIGHT: 74 IN | DIASTOLIC BLOOD PRESSURE: 70 MMHG | WEIGHT: 183.2 LBS | RESPIRATION RATE: 17 BRPM | BODY MASS INDEX: 23.51 KG/M2 | TEMPERATURE: 97.6 F | HEART RATE: 78 BPM

## 2021-08-18 DIAGNOSIS — G89.4 CHRONIC PAIN DISORDER: ICD-10-CM

## 2021-08-18 DIAGNOSIS — G40.209 COMPLEX PARTIAL SEIZURE EVOLVING TO GENERALIZED SEIZURE (HCC): Chronic | ICD-10-CM

## 2021-08-18 DIAGNOSIS — Z96.661 H/O TOTAL ANKLE REPLACEMENT, RIGHT: ICD-10-CM

## 2021-08-18 DIAGNOSIS — I10 ESSENTIAL HYPERTENSION: ICD-10-CM

## 2021-08-18 DIAGNOSIS — M51.36 DDD (DEGENERATIVE DISC DISEASE), LUMBAR: Primary | ICD-10-CM

## 2021-08-18 DIAGNOSIS — F33.9 DEPRESSION, RECURRENT (HCC): ICD-10-CM

## 2021-08-18 PROCEDURE — 99214 OFFICE O/P EST MOD 30 MIN: CPT | Performed by: FAMILY MEDICINE

## 2021-08-18 RX ORDER — MELOXICAM 15 MG/1
TABLET ORAL
Qty: 30 TABLET | Refills: 5 | Status: SHIPPED | OUTPATIENT
Start: 2021-08-18 | End: 2022-07-12

## 2021-08-18 RX ORDER — GABAPENTIN 600 MG/1
600 TABLET ORAL 2 TIMES DAILY
Qty: 60 TABLET | Refills: 5 | Status: SHIPPED | OUTPATIENT
Start: 2021-08-18 | End: 2022-04-14

## 2021-08-18 RX ORDER — B-COMPLEX WITH VITAMIN C
TABLET ORAL
COMMUNITY

## 2021-08-18 NOTE — ASSESSMENT & PLAN NOTE
Marino Nolan was approved  For MM card but has never received it   Continue gabapentin 600 mg BID  Restart PRN meloxicam  Patient may be a good candidate for Cymbalta given chronic symptoms of depression and pain    He is reluctant to consider this medication at present but will contact me if he reconsiders

## 2021-08-18 NOTE — PROGRESS NOTES
FAMILY PRACTICE OFFICE VISIT       NAME: Gabriela Kent  AGE: 61 y o  SEX: male       : 1955        MRN: [id number]        Assessment and Plan     1  DDD (degenerative disc disease), lumbar  Assessment & Plan:   Chronic low back pain  Patient declines further evaluation/ imaging  He will restart meloxicam 15 mg once a day after food on an as-needed basis  Since patient has been skipping mid day dose of gabapentin -will simplify regimen by using 600 mg twice a day  Patient will contact me if low back pain is persisting /not improving  Orders:  -     gabapentin (NEURONTIN) 600 MG tablet; Take 1 tablet (600 mg total) by mouth 2 (two) times a day  -     meloxicam (MOBIC) 15 mg tablet; Take 1 tablet once a day after food as needed for low back pain    2  Chronic pain disorder  Assessment & Plan:  Deidra Peabody was approved  For MM card but has never received it   Continue gabapentin 600 mg BID  Restart PRN meloxicam  Patient may be a good candidate for Cymbalta given chronic symptoms of depression and pain  He is reluctant to consider this medication at present but will contact me if he reconsiders      3  Essential hypertension  Assessment & Plan:  Blood pressure is well controlled  Patient has been off lisinopril for quite some time  Continue monitoring      4  H/O total ankle replacement, right  Assessment & Plan:  Reported failed ankle arthroplasty  Patient reports chronic ankle pain      5  Depression, recurrent ([address] )  Assessment & Plan:   Ongoing financial and family related stressors  Patient declines any medications for depression/ anxiety            6  Complex partial seizure evolving to generalized seizure [facility])  Assessment & Plan:   Patient remains seizure-free, [facility]'s Neurology follows, patient remains on Keppra      I have spent 25 minutes with Patient  today in which greater than 50% of this time was spent in counseling/coordination of care regarding Diagnostic results, Risks and benefits of tx options, Intructions for management, Patient and family education, Importance of tx compliance and Risk factor reductions  There are no Patient Instructions on file for this visit  No follow-ups on file  Discussed with the patient and all questioned fully answered  He will call me if any problems arise  M*Modal software was used to dictate this note  It may contain errors with dictating incorrect words/spelling  Please contact provider directly with any questions  Chief Complaint     Chief Complaint   Patient presents with    Back Pain       History of Present Illness     Patient presents for evaluation of worsening chronic low back pain  Patient with known multilevel lumbar disc disease / spondylosis  And chronic arthritic hip pain  Patient does not own a car and has been walking for extended periods of time  He walks to the grocery store and recently had to walk to see his neurologist for routine follow-up  Patient believes that he might be missing one of medications for chronic low back pain that he used in the past   He brought all his medication bottles for med reconciliation today  Reportedly, patient has not been using lisinopril for quite a while  He is also lacking meloxicam     Pain from the lower back occasionally radiates to the right leg  Patient has been experiencing chronic ankle pain  He reports fall due to clumsiness while walking 2 to 3 weeks ago, no head injury  Current dose of gabapentin is 300 mg in the morning and 600 mg at night  Patient frequently forgets to take mid day dose of 300 mg at lunch time  Patient denies symptoms of bowel or bladder dysfunction  No saddle paresthesias  Most recent lumbar sacral x-ray was performed in November of 2020  Patient currently does not have medical marijuana card on hand  Patient states that he paid anual fee for re-certification but has never received his cart            Back Pain  Pertinent negatives include no headaches  Review of Systems   Review of Systems   Constitutional: Negative  HENT: Negative  Eyes: Negative  Respiratory: Negative  Cardiovascular: Negative  Endocrine: Negative  Genitourinary: Negative  Musculoskeletal: Positive for arthralgias, back pain and gait problem  Allergic/Immunologic: Negative  Neurological: Negative for dizziness and headaches         Active Problem List     Patient Active Problem List   Diagnosis    DDD (degenerative disc disease), cervical    Complex partial seizure evolving to generalized seizure ([address] )    Chronic pain disorder    DDD (degenerative disc disease), lumbar    Hyperlipidemia    Hypertension    Aneurysm of ascending aorta ([address] )    Polyneuropathy    Medical marijuana use    H/O total ankle replacement, right    History of failed arthroplasty of ankle    Depression, recurrent ([address] )    Tremor       Past Medical History:  Past Medical History:   Diagnosis Date    Aneurysm, ascending aorta ([address] )     Anxiety disorder     Arthritis     Last assessed: 11/17/16    Asthma     DDD (degenerative disc disease), cervical     Depression     Hyperlipidemia     Hypertension     Multiple fractures of ribs, left side, init for clos fx 11/15/2020    Seizures ([address] )     Stroke syndrome        Past Surgical History:  Past Surgical History:   Procedure Laterality Date    ANKLE SURGERY      COLONOSCOPY  2009    Due 2014    HERNIA REPAIR      ROOT CANAL      TOTAL HIP ARTHROPLASTY Left 02/2014    TOTAL HIP ARTHROPLASTY Right        Family History:  Family History   Problem Relation Age of Onset    Anxiety disorder Mother         Symptom/disorder    Hypertension Mother         Benign Essential    Mitral valve prolapse Mother         Echo/Systolic    PABLO disease Mother     Fibromyalgia Mother     Hyperlipidemia Mother     Diabetes Father         Mellitus    Cancer Brother     Stroke Family CVA    Cancer Family     Sudden death Family         Instantaneous Cardiac Death    Other Family         Connective Tissue Defect       Social History:  Social History     Socioeconomic History    Marital status:      Spouse name: Not on file    Number of children: Not on file    Years of education: Bachelor's Degree    Highest education level: Not on file   Occupational History    Not on file   Tobacco Use    Smoking status: Former Smoker    Smokeless tobacco: Never Used   Vaping Use    Vaping Use: Former    Quit date: 1/1/2011   Substance and Sexual Activity    Alcohol use: Not Currently     Comment: Occasionally    Drug use: Yes     Types: Marijuana     Comment: medical marijuana    Sexual activity: Not on file   Other Topics Concern    Not on file   Social History Narrative    Daily coffee consumption: 7 cups/day    Per [employer]: Currently      Social Determinants of Health     Financial Resource Strain:     Difficulty of Paying Living Expenses:    Food Insecurity:     Worried About Running Out of Food in the Last Year:     Ran Out of Food in the Last Year:    Transportation Needs:     Lack of Transportation (Medical):      Lack of Transportation (Non-Medical):    Physical Activity:     Days of Exercise per Week:     Minutes of Exercise per Session:    Stress:     Feeling of Stress :    Social Connections:     Frequency of Communication with Friends and Family:     Frequency of Social Gatherings with Friends and Family:     Attends [employer] Services:     Active Member of Clubs or Organizations:     Attends Club or Organization Meetings:     Marital Status:    Intimate Partner Violence:     Fear of Current or Ex-Partner:     Emotionally Abused:     Physically Abused:     Sexually Abused:          Objective     Vitals:    08/18/21 0840 08/18/21 0927   BP: 118/84 112/70   BP Location: Left arm    Patient Position: Sitting    Cuff Size: Adult    Pulse: 78    Resp: 17    Temp: 97 6 °F (36 4 °C)    TempSrc: Tympanic    SpO2: 98%    Weight: 83 1 kg (183 lb 3 2 oz)    Height: 6' 2" (1 88 m)        Wt Readings from Last 3 Encounters:   08/18/21 83 1 kg (183 lb 3 2 oz)   08/09/21 81 8 kg (180 lb 6 4 oz)   05/11/21 82 9 kg (182 lb 12 8 oz)       Physical Exam  Vitals and nursing note reviewed  Constitutional:       General: He is not in acute distress  Appearance: Normal appearance  Cardiovascular:      Rate and Rhythm: Normal rate and regular rhythm  Heart sounds: Normal heart sounds  No murmur heard  Pulmonary:      Effort: Pulmonary effort is normal  No respiratory distress  Breath sounds: Normal breath sounds  Musculoskeletal:      Right lower leg: No edema  Left lower leg: No edema  Comments: Antalgic limping gait  No reproducible tenderness with palpation of lumbar spine  Chronic paraspinal lumbar spasm  Neurological:      General: No focal deficit present  Mental Status: He is alert and oriented to person, place, and time  Psychiatric:         Mood and Affect: Mood is anxious and depressed  Behavior: Behavior normal          Thought Content:  Thought content normal           Pertinent Laboratory/Diagnostic Studies:    Lab Results   Component Value Date    WBC 6 74 05/13/2021    HGB 14 3 05/13/2021    HCT 45 0 05/13/2021    MCV 96 05/13/2021     05/13/2021       No results found for: TSH    Lab Results   Component Value Date    CHOL 200 12/18/2015     Lab Results   Component Value Date    TRIG 109 05/13/2021     Lab Results   Component Value Date    HDL 49 05/13/2021     Lab Results   Component Value Date    LDLCALC 128 (H) 05/13/2021     Lab Results   Component Value Date    HGBA1C 4 9 03/18/2014     Lab Results   Component Value Date    SODIUM 138 05/13/2021    K 3 8 05/13/2021     05/13/2021    CO2 32 05/13/2021    ANIONGAP 8 12/18/2015    AGAP 4 05/13/2021    BUN 21 05/13/2021    CREATININE 0 72 05/13/2021 GLUC 88 05/13/2021    GLUF 74 05/26/2020    CALCIUM 9 1 05/13/2021    AST 34 05/13/2021    ALT 55 05/13/2021    ALKPHOS 67 05/13/2021    PROT 7 1 12/18/2015    TP 7 6 05/13/2021    BILITOT 0 54 12/18/2015    TBILI 0 40 05/13/2021    EGFR 99 05/13/2021       No orders of the defined types were placed in this encounter  ALLERGIES:  Allergies   Allergen Reactions    Gluten Meal - Food Allergy Other (See Comments)     UNKNOWN       Current Medications     Current Outpatient Medications   Medication Sig Dispense Refill    acetaminophen (TYLENOL) 325 mg tablet Take 3 tablets (975 mg total) by mouth every 8 (eight) hours 1 Bottle 0    aspirin 81 MG tablet Take 81 mg by mouth daily   Cholecalciferol (VITAMIN D3) 2000 units TABS Take 2,000 Units by mouth daily      levETIRAcetam (KEPPRA) 500 mg tablet Take 2 tablets (1,000 mg total) by mouth every 12 (twelve) hours 360 tablet 3    meloxicam (MOBIC) 15 mg tablet Take 1 tablet once a day after food as needed for low back pain 30 tablet 5    Omega-3 Fatty Acids (FISH OIL) 1200 MG CAPS Take by mouth daily      rosuvastatin (CRESTOR) 10 MG tablet Take 1 tablet (10 mg total) by mouth daily 90 tablet 3    Calcium Carbonate-Vitamin D 600-200 MG-UNIT TABS Calcium + D3 600-200 MG-UNIT Oral Tablet    Refills: 0       Active (Patient not taking: Reported on 8/18/2021)      CANNABIDIOL PO Take by mouth (Patient not taking: Reported on 8/18/2021)      gabapentin (NEURONTIN) 600 MG tablet Take 1 tablet (600 mg total) by mouth 2 (two) times a day 60 tablet 5     No current facility-administered medications for this visit         Medications Discontinued During This Encounter   Medication Reason    gabapentin (NEURONTIN) 300 mg capsule     meloxicam (MOBIC) 15 mg tablet Reorder    lisinopril (ZESTRIL) 2 5 mg tablet        Health Maintenance     Health Maintenance   Topic Date Due    SLP PLAN OF CARE  Never done    HIV Screening  Never done    Influenza Vaccine (1) 09/01/2021    Medicare Annual Wellness Visit (AWV)  05/11/2022    Depression Remission PHQ  05/11/2022    BMI: Adult  08/18/2022    Colorectal Cancer Screening  02/17/2023    DTaP,Tdap,and Td Vaccines (2 - Td or Tdap) 11/14/2030    Hepatitis C Screening  Completed    COVID-19 Vaccine  Completed    Pneumococcal Vaccine: Pediatrics (0 to 5 Years) and At-Risk Patients (6 to 59 Years)  Aged Out    HIB Vaccine  Aged Out    Hepatitis B Vaccine  Aged Out    IPV Vaccine  Aged Out    Hepatitis A Vaccine  Aged Out    Meningococcal ACWY Vaccine  Aged Out    HPV Vaccine  Aged [employer] History   Administered Date(s) Administered    Influenza, recombinant, quadrivalent,injectable, preservative free 11/11/2020    Influenza, seasonal, injectable 10/09/2014, 09/16/2015    SARS-CoV-2 / COVID-19 mRNA IM (Rocio Oscar) 03/25/2021, 04/22/2021    Tdap 11/14/2020    influenza, injectable, quadrivalent 10/29/2019       Cherylene Quarry, MD

## 2021-08-20 NOTE — ASSESSMENT & PLAN NOTE
Ongoing financial and family related stressors  Patient declines any medications for depression/ anxiety

## 2021-08-20 NOTE — ASSESSMENT & PLAN NOTE
Blood pressure is well controlled  Patient has been off lisinopril for quite some time      Continue monitoring

## 2021-08-20 NOTE — ASSESSMENT & PLAN NOTE
Patient remains seizure-free, [facility]'s Neurology follows, patient remains on [address]

## 2021-08-20 NOTE — ASSESSMENT & PLAN NOTE
Chronic low back pain  Patient declines further evaluation/ imaging  He will restart meloxicam 15 mg once a day after food on an as-needed basis  Since patient has been skipping mid day dose of gabapentin -will simplify regimen by using 600 mg twice a day  Patient will contact me if low back pain is persisting /not improving

## 2021-11-30 ENCOUNTER — OFFICE VISIT (OUTPATIENT)
Dept: DENTISTRY | Facility: CLINIC | Age: 66
End: 2021-11-30

## 2021-11-30 VITALS — DIASTOLIC BLOOD PRESSURE: 78 MMHG | TEMPERATURE: 97.5 F | HEART RATE: 67 BPM | SYSTOLIC BLOOD PRESSURE: 125 MMHG

## 2021-11-30 DIAGNOSIS — K05.6 PERIODONTAL DISEASE: Primary | ICD-10-CM

## 2021-11-30 PROCEDURE — D0274 BITEWINGS - 4 RADIOGRAPHIC IMAGES: HCPCS

## 2021-11-30 PROCEDURE — D0120 PERIODIC ORAL EVALUATION - ESTABLISHED PATIENT: HCPCS

## 2021-12-02 ENCOUNTER — HOSPITAL ENCOUNTER (OUTPATIENT)
Dept: RADIOLOGY | Facility: HOSPITAL | Age: 66
Discharge: HOME | End: 2021-12-02
Attending: INTERNAL MEDICINE
Payer: COMMERCIAL

## 2021-12-02 DIAGNOSIS — I71.2 ANEURYSM OF ASCENDING AORTA (HCC): ICD-10-CM

## 2021-12-02 PROCEDURE — 71250 CT THORAX DX C-: CPT

## 2021-12-02 PROCEDURE — G1004 CDSM NDSC: HCPCS

## 2022-01-07 ENCOUNTER — TELEPHONE (OUTPATIENT)
Dept: OTHER | Facility: OTHER | Age: 67
End: 2022-01-07

## 2022-01-07 NOTE — TELEPHONE ENCOUNTER
Patient called this morning asking for his appointment to be rescheduled   Appointment for today cancelled, please reach out to reschedule

## 2022-01-27 ENCOUNTER — RA CDI HCC (OUTPATIENT)
Dept: OTHER | Facility: HOSPITAL | Age: 67
End: 2022-01-27

## 2022-01-27 NOTE — PROGRESS NOTES
[facility]  coding opportunities       Chart reviewed, no opportunity found: CHART REVIEWED, NO OPPORTUNITY FOUND                        Patients insurance company: [facility] [address]  (Medicare Advantage and Commercial)

## 2022-01-28 ENCOUNTER — OFFICE VISIT (OUTPATIENT)
Dept: CARDIOLOGY CLINIC | Facility: CLINIC | Age: 67
End: 2022-01-28
Payer: COMMERCIAL

## 2022-01-28 VITALS
DIASTOLIC BLOOD PRESSURE: 72 MMHG | HEIGHT: 74 IN | WEIGHT: 190 LBS | OXYGEN SATURATION: 98 % | BODY MASS INDEX: 24.38 KG/M2 | HEART RATE: 61 BPM | SYSTOLIC BLOOD PRESSURE: 106 MMHG

## 2022-01-28 DIAGNOSIS — E78.5 HYPERLIPIDEMIA, UNSPECIFIED HYPERLIPIDEMIA TYPE: Primary | ICD-10-CM

## 2022-01-28 DIAGNOSIS — R07.2 CHEST PAIN, PRECORDIAL: ICD-10-CM

## 2022-01-28 DIAGNOSIS — I71.2 ANEURYSM OF ASCENDING AORTA (HCC): ICD-10-CM

## 2022-01-28 PROCEDURE — 3074F SYST BP LT 130 MM HG: CPT | Performed by: INTERNAL MEDICINE

## 2022-01-28 PROCEDURE — 3008F BODY MASS INDEX DOCD: CPT | Performed by: INTERNAL MEDICINE

## 2022-01-28 PROCEDURE — 3078F DIAST BP <80 MM HG: CPT | Performed by: INTERNAL MEDICINE

## 2022-01-28 PROCEDURE — 1036F TOBACCO NON-USER: CPT | Performed by: INTERNAL MEDICINE

## 2022-01-28 PROCEDURE — 99214 OFFICE O/P EST MOD 30 MIN: CPT | Performed by: INTERNAL MEDICINE

## 2022-01-28 NOTE — PROGRESS NOTES
[facility]  Follow up note  Wilfrido Zarate 61 y o  male MRN: [id number]        Problems    1  Hyperlipidemia, unspecified hyperlipidemia type  NM myocardial perfusion spect (rx stress and/or rest)   2  Aneurysm of ascending aorta (HCC)  NM myocardial perfusion spect (rx stress and/or rest)    CT chest wo contrast   3  Chest pain, precordial  NM myocardial perfusion spect (rx stress and/or rest)       Impression:        TIA  o  small PFO identified remotely, aspirin prophylaxis ever since   o  no recurrent symptoms   Hypertension  o   Blood pressure is not   Hyperlipidemia  o  not compliant with rosuvastatin, LDL uncontrolled 128 at intermediate risk   o He does not have coronary calcification    ascending aortic aneurysm   o   Unchanged at 44 mm in the last 2 years   o 2 year follow-up imaging recommended    Plan:       2 year follow-up    Noncontrast CT in 2 years for continued follow-up of current stable 44 mm ascending aortic aneurysm    Lexiscan Myoview stress test on account of recent concerning chest pain symptoms in a gentleman with intermediate cardiovascular risk and gait dysfunction using a cane      HPI:   Wilfrido Zarate is a 61y o  year old male with TIA, small PFO, hypertension, hyperlipidemia, mild ascending aortic aneurysm,  Ambulatory dysfunction using a cane, comes to see me for a follow-up visit     His blood pressure remains excellent, he is not on any antihypertensive medical therapy   His lipids are not great, , he is not taking rosuvastatin, does not seem interested in resuming it, and tells me he will discuss further with his PCP next week who had prescribed back in May of 2021  His ASCVD risk is intermediate  I discussed the implication of this with him today  That being said, his recent noncontrast chest CT for aneurysm follow-up revealed no evidence of any coronary calcium        Her he is complaining of chest discomfort, bilateral upper chest, intermittent, sometimes with exertion, not debilitating, no associated shortness of breath, palpitations, lightheadedness, orthopnea, lower extremity edema  He had an abnormal stress test in 2019 that was felt to be more likely artifactual, but inferior defect that wraps around to the apex was fairly extensive, although fixed and quite possibly artifactual       Review of Systems   Constitutional: Negative for appetite change, diaphoresis, fatigue and fever  Respiratory: Negative for chest tightness, shortness of breath and wheezing  Cardiovascular: Positive for chest pain  Negative for palpitations and leg swelling  Gastrointestinal: Negative for abdominal pain and blood in stool  Musculoskeletal: Positive for arthralgias and gait problem  Negative for joint swelling  Skin: Negative for rash  Neurological: Negative for dizziness, syncope and light-headedness  Past Medical History:   Diagnosis Date    Aneurysm, ascending aorta (HCC)     Anxiety disorder     Arthritis     Last assessed: 11/17/16    Asthma     DDD (degenerative disc disease), cervical     Depression     Hyperlipidemia     Hypertension     Multiple fractures of ribs, left side, init for clos fx 11/15/2020    Seizures ([facility] )     Stroke syndrome      Social History     Substance and Sexual Activity   Alcohol Use Not Currently    Comment: Occasionally     Social History     Substance and Sexual Activity   Drug Use Yes    Types: Marijuana    Comment: medical marijuana     Social History     Tobacco Use   Smoking Status Former Smoker   Smokeless Tobacco Never Used       Allergies: Allergies   Allergen Reactions    Gluten Meal - Food Allergy Other (See Comments)     UNKNOWN       Medications:     Current Outpatient Medications:     aspirin 81 MG tablet, Take 81 mg by mouth daily  , Disp: , Rfl:     CANNABIDIOL PO, Take by mouth  , Disp: , Rfl:     gabapentin (NEURONTIN) 600 MG tablet, Take 1 tablet (600 mg total) by mouth 2 (two) times a day, Disp: 60 tablet, Rfl: 5    levETIRAcetam (KEPPRA) 500 mg tablet, Take 2 tablets (1,000 mg total) by mouth every 12 (twelve) hours, Disp: 360 tablet, Rfl: 3    meloxicam (MOBIC) 15 mg tablet, Take 1 tablet once a day after food as needed for low back pain, Disp: 30 tablet, Rfl: 5    acetaminophen (TYLENOL) 325 mg tablet, Take 3 tablets (975 mg total) by mouth every 8 (eight) hours (Patient not taking: Reported on 1/28/2022 ), Disp: 1 Bottle, Rfl: 0    Calcium Carbonate-Vitamin D 600-200 MG-UNIT TABS, Calcium + D3 600-200 MG-UNIT Oral Tablet   Refills: 0     Active (Patient not taking: Reported on 8/18/2021), Disp: , Rfl:     Cholecalciferol (VITAMIN D3) 2000 units TABS, Take 2,000 Units by mouth daily (Patient not taking: Reported on 1/28/2022 ), Disp: , Rfl:     Omega-3 Fatty Acids (FISH OIL) 1200 MG CAPS, Take by mouth daily (Patient not taking: Reported on 1/28/2022 ), Disp: , Rfl:     rosuvastatin (CRESTOR) 10 MG tablet, Take 1 tablet (10 mg total) by mouth daily (Patient not taking: Reported on 1/28/2022 ), Disp: 90 tablet, Rfl: 3      Vitals:    01/28/22 0837   BP: 106/72   Pulse: 61   SpO2: 98%     Weight (last 2 days)     Date/Time Weight    01/28/22 0837 86 2 (190)        Physical Exam  Constitutional:       General: He is not in acute distress  Appearance: He is not diaphoretic  HENT:      Head: Normocephalic and atraumatic  Eyes:      General: No scleral icterus  Conjunctiva/sclera: Conjunctivae normal    Neck:      Vascular: No JVD  Cardiovascular:      Rate and Rhythm: Normal rate and regular rhythm  Heart sounds: Normal heart sounds  No murmur heard  Pulmonary:      Effort: Pulmonary effort is normal  No respiratory distress  Breath sounds: Normal breath sounds  No decreased breath sounds, wheezing, rhonchi or rales  Musculoskeletal:      Cervical back: Normal range of motion  Right lower leg: Normal  No edema  Left lower leg: Normal  No edema     Skin: General: Skin is warm and dry  Neurological:      Mental Status: He is alert and oriented to person, place, and time  Laboratory Studies: All laboratory studies personally reviewed    Cardiac testing:     EKG reviewed personally:    2019-Normal sinus rhythm, normal EKG  12/20-normal sinus rhythm, cannot rule out inferior infarct     Stress Myoview   7/19-EF normal, inferior artifact    Echocardiogram   5/19-EF normal      Isatu Moore MD    Portions of the record may have been created with voice recognition software  Occasional wrong word or "sound a like" substitutions may have occurred due to the inherent limitations of voice recognition software  Read the chart carefully and recognize, using context, where substitutions have occurred

## 2022-02-02 ENCOUNTER — APPOINTMENT (OUTPATIENT)
Dept: LAB | Facility: CLINIC | Age: 67
End: 2022-02-02
Payer: COMMERCIAL

## 2022-02-02 ENCOUNTER — OFFICE VISIT (OUTPATIENT)
Dept: FAMILY MEDICINE CLINIC | Facility: CLINIC | Age: 67
End: 2022-02-02
Payer: COMMERCIAL

## 2022-02-02 VITALS
DIASTOLIC BLOOD PRESSURE: 60 MMHG | HEIGHT: 74 IN | TEMPERATURE: 97.6 F | RESPIRATION RATE: 16 BRPM | BODY MASS INDEX: 24.64 KG/M2 | WEIGHT: 192 LBS | HEART RATE: 72 BPM | SYSTOLIC BLOOD PRESSURE: 110 MMHG | OXYGEN SATURATION: 98 %

## 2022-02-02 DIAGNOSIS — I71.2 ANEURYSM OF ASCENDING AORTA (HCC): ICD-10-CM

## 2022-02-02 DIAGNOSIS — Z13.1 SCREENING FOR DIABETES MELLITUS (DM): ICD-10-CM

## 2022-02-02 DIAGNOSIS — F33.9 DEPRESSION, RECURRENT (HCC): ICD-10-CM

## 2022-02-02 DIAGNOSIS — I10 PRIMARY HYPERTENSION: ICD-10-CM

## 2022-02-02 DIAGNOSIS — R07.2 CHEST PAIN, PRECORDIAL: ICD-10-CM

## 2022-02-02 DIAGNOSIS — G40.209 COMPLEX PARTIAL SEIZURE EVOLVING TO GENERALIZED SEIZURE (HCC): Chronic | ICD-10-CM

## 2022-02-02 DIAGNOSIS — E78.5 HYPERLIPIDEMIA, UNSPECIFIED HYPERLIPIDEMIA TYPE: ICD-10-CM

## 2022-02-02 DIAGNOSIS — I10 PRIMARY HYPERTENSION: Primary | ICD-10-CM

## 2022-02-02 LAB
ALBUMIN SERPL BCP-MCNC: 4.1 G/DL (ref 3.5–5)
ALP SERPL-CCNC: 65 U/L (ref 46–116)
ALT SERPL W P-5'-P-CCNC: 18 U/L (ref 12–78)
ANION GAP SERPL CALCULATED.3IONS-SCNC: 2 MMOL/L (ref 4–13)
AST SERPL W P-5'-P-CCNC: 19 U/L (ref 5–45)
BASOPHILS # BLD AUTO: 0.05 THOUSANDS/ΜL (ref 0–0.1)
BASOPHILS NFR BLD AUTO: 0 % (ref 0–1)
BILIRUB SERPL-MCNC: 0.54 MG/DL (ref 0.2–1)
BUN SERPL-MCNC: 21 MG/DL (ref 5–25)
CALCIUM SERPL-MCNC: 9 MG/DL (ref 8.3–10.1)
CHLORIDE SERPL-SCNC: 104 MMOL/L (ref 100–108)
CHOLEST SERPL-MCNC: 154 MG/DL
CO2 SERPL-SCNC: 30 MMOL/L (ref 21–32)
CREAT SERPL-MCNC: 0.78 MG/DL (ref 0.6–1.3)
EOSINOPHIL # BLD AUTO: 0.1 THOUSAND/ΜL (ref 0–0.61)
EOSINOPHIL NFR BLD AUTO: 1 % (ref 0–6)
ERYTHROCYTE [DISTWIDTH] IN BLOOD BY AUTOMATED COUNT: 12.7 % (ref 11.6–15.1)
EST. AVERAGE GLUCOSE BLD GHB EST-MCNC: 103 MG/DL
GFR SERPL CREATININE-BSD FRML MDRD: 95 ML/MIN/1.73SQ M
GLUCOSE P FAST SERPL-MCNC: 76 MG/DL (ref 65–99)
HBA1C MFR BLD: 5.2 %
HCT VFR BLD AUTO: 44.5 % (ref 36.5–49.3)
HDLC SERPL-MCNC: 37 MG/DL
HGB BLD-MCNC: 14.4 G/DL (ref 12–17)
IMM GRANULOCYTES # BLD AUTO: 0.06 THOUSAND/UL (ref 0–0.2)
IMM GRANULOCYTES NFR BLD AUTO: 0 % (ref 0–2)
LDLC SERPL CALC-MCNC: 102 MG/DL (ref 0–100)
LYMPHOCYTES # BLD AUTO: 1.21 THOUSANDS/ΜL (ref 0.6–4.47)
LYMPHOCYTES NFR BLD AUTO: 9 % (ref 14–44)
MCH RBC QN AUTO: 31.2 PG (ref 26.8–34.3)
MCHC RBC AUTO-ENTMCNC: 32.4 G/DL (ref 31.4–37.4)
MCV RBC AUTO: 97 FL (ref 82–98)
MONOCYTES # BLD AUTO: 1.15 THOUSAND/ΜL (ref 0.17–1.22)
MONOCYTES NFR BLD AUTO: 8 % (ref 4–12)
NEUTROPHILS # BLD AUTO: 11.55 THOUSANDS/ΜL (ref 1.85–7.62)
NEUTS SEG NFR BLD AUTO: 82 % (ref 43–75)
NRBC BLD AUTO-RTO: 0 /100 WBCS
PLATELET # BLD AUTO: 218 THOUSANDS/UL (ref 149–390)
PMV BLD AUTO: 10.5 FL (ref 8.9–12.7)
POTASSIUM SERPL-SCNC: 5 MMOL/L (ref 3.5–5.3)
PROT SERPL-MCNC: 7.9 G/DL (ref 6.4–8.2)
RBC # BLD AUTO: 4.61 MILLION/UL (ref 3.88–5.62)
SODIUM SERPL-SCNC: 136 MMOL/L (ref 136–145)
TRIGL SERPL-MCNC: 74 MG/DL
TSH SERPL DL<=0.05 MIU/L-ACNC: 3.47 UIU/ML (ref 0.36–3.74)
WBC # BLD AUTO: 14.12 THOUSAND/UL (ref 4.31–10.16)

## 2022-02-02 PROCEDURE — 1036F TOBACCO NON-USER: CPT | Performed by: FAMILY MEDICINE

## 2022-02-02 PROCEDURE — 99214 OFFICE O/P EST MOD 30 MIN: CPT | Performed by: FAMILY MEDICINE

## 2022-02-02 PROCEDURE — 36415 COLL VENOUS BLD VENIPUNCTURE: CPT

## 2022-02-02 PROCEDURE — 83036 HEMOGLOBIN GLYCOSYLATED A1C: CPT

## 2022-02-02 PROCEDURE — 80053 COMPREHEN METABOLIC PANEL: CPT

## 2022-02-02 PROCEDURE — 84443 ASSAY THYROID STIM HORMONE: CPT

## 2022-02-02 PROCEDURE — 80061 LIPID PANEL: CPT

## 2022-02-02 PROCEDURE — 3078F DIAST BP <80 MM HG: CPT | Performed by: FAMILY MEDICINE

## 2022-02-02 PROCEDURE — 3074F SYST BP LT 130 MM HG: CPT | Performed by: FAMILY MEDICINE

## 2022-02-02 PROCEDURE — 3008F BODY MASS INDEX DOCD: CPT | Performed by: FAMILY MEDICINE

## 2022-02-02 PROCEDURE — 85025 COMPLETE CBC W/AUTO DIFF WBC: CPT

## 2022-02-02 NOTE — ASSESSMENT & PLAN NOTE
CT chest 12/20/2021:  4 3 cm ascending aortic aneurysm, essentially unchanged since a CT from 11/14/2020     Follow-up CT chest ordered by Cardiology for December of 2023

## 2022-02-02 NOTE — PROGRESS NOTES
FAMILY PRACTICE OFFICE VISIT       NAME: Cydney Amaro  AGE: 61 y o  SEX: male       : 1955        MRN: [id number]        Assessment and Plan     1  Primary hypertension  Assessment & Plan:  No B/P Rx for now    Orders:  -     CBC and differential; Future    2  Hyperlipidemia, unspecified hyperlipidemia type  Assessment & Plan:  Continue Rosuvastatin 10 mg daily    Orders:  -     CBC and differential; Future  -     Comprehensive metabolic panel; Future  -     Lipid Panel with Direct LDL reflex; Future  -     TSH, 3rd generation; Future    3  Complex partial seizure evolving to generalized seizure [facility])  Assessment & Plan:  [facility] neurology  follows  Continue Keppra      4  Aneurysm of ascending aorta [facility])  Assessment & Plan:  CT chest 2021:  4 3 cm ascending aortic aneurysm, essentially unchanged since a CT from 2020  Follow-up CT chest ordered by Cardiology for 2023       5  Screening for diabetes mellitus (DM)  -     Hemoglobin A1C; Future    6  Depression, recurrent ([facility] [address] )  Assessment & Plan:  Coping OK  Declines antidepressant Rx      7  Chest pain, precordial  Assessment & Plan:  · Intermittent symptoms of anterior chest wall pain, radiating to upper extremities, nonexertional, no associated dyspnea, diaphoresis, dizziness or palpitations  · Patient had recent follow-up with [facility] and was advised to proceed with nuclear stress test   I reprinted orders and strongly advised him to proceed  · Blood pressure is well controlled, he remains on statin and aspirin             There are no Patient Instructions on file for this visit  Return in about 7 months (around 2022) for Medicare wellness, follow up  Discussed with the patient and all questioned fully answered  He will call me if any problems arise  M*Modal software was used to dictate this note  It may contain errors with dictating incorrect words/spelling   Please contact provider directly with any questions  Chief Complaint     Chief Complaint   Patient presents with    Follow-up     6 month    COVID-19     Feels he had virus over iday- did isolate       History of Present Illness     Flu like symptoms over the Xmas- suspects he might have had Omicron  Patient has recovered  No Sob, no cough, no residual symptoms  Symptoms lasted  4 days- resolved  UTD with all 3 COVID vaccines      Occ  B/L chest wall pain- non exertional - radiating to UE  Patient discussed those symptoms during recent follow-up with Dr Causey  Cardiology ordered NST  Previous nuclear stress test was performed back in 2019; as per Cardiology there was artifact with no ischemia, nevertheless given patient's symptoms and risk factors including hyperlipidemia, age and history of hypertension-cardiology recommends to proceed with nuclear stress test   Patient's suspect that his symptoms are likely triggered by chronic shoulder pain/arthritis  He denies symptoms of dyspnea, palpitations or dizziness  CT chest   :4 3 cm ascending aortic aneurysm, essentially unchanged since a CT from 2020  Cardiology recommends to repeat CT chest in 2 years, 2023    Chronic arthritic neck and back pain  Patient remains on regimen of p r n  meloxicam and gabapentin 600 mg twice a day  His marijuana medical card has  and he is not able to for to renew it  Patient reports chronic symptoms of depression and anxiety  Symptoms are essentially unchanged  Significantly triggered by family related stress  Patient declines any medication treatment  He states he is coping well  Review of Systems   Review of Systems   Constitutional: Negative  HENT: Negative  Eyes: Negative  Respiratory: Negative  Cardiovascular: Positive for chest pain (as per HPI)  Gastrointestinal: Negative  Endocrine: Negative  Genitourinary: Negative      Musculoskeletal: Positive for arthralgias, back pain and neck pain  Skin: Negative  Allergic/Immunologic: Negative  Neurological: Negative  Hematological: Negative  Psychiatric/Behavioral: Positive for dysphoric mood  Negative for self-injury and suicidal ideas  The patient is nervous/anxious           As per HPI       Active Problem List     Patient Active Problem List   Diagnosis    DDD (degenerative disc disease), cervical    Complex partial seizure evolving to generalized seizure ([address] )    Chronic pain disorder    DDD (degenerative disc disease), lumbar    Hyperlipidemia    Hypertension    Aneurysm of ascending aorta ([address] )    Polyneuropathy    Medical marijuana use    H/O total ankle replacement, right    History of failed arthroplasty of ankle    Depression, recurrent ([address] )    Tremor    Chest pain, precordial       Past Medical History:  Past Medical History:   Diagnosis Date    Aneurysm, ascending aorta ([address] )     Anxiety disorder     Arthritis     Last assessed: 11/17/16    Asthma     DDD (degenerative disc disease), cervical     Depression     Hyperlipidemia     Hypertension     Multiple fractures of ribs, left side, init for clos fx 11/15/2020    Seizures ([address] )     Stroke syndrome        Past Surgical History:  Past Surgical History:   Procedure Laterality Date    ANKLE SURGERY      COLONOSCOPY  2009    Due 2014    HERNIA REPAIR      ROOT CANAL      TOTAL HIP ARTHROPLASTY Left 02/2014    TOTAL HIP ARTHROPLASTY Right        Family History:  Family History   Problem Relation Age of Onset    Anxiety disorder Mother         Symptom/disorder    Hypertension Mother         Benign Essential    Mitral valve prolapse Mother         Echo/Systolic    PABLO disease Mother     Fibromyalgia Mother     Hyperlipidemia Mother     Diabetes Father         Mellitus    Cancer Brother     Stroke Family         CVA    Cancer Family     Sudden death Family         Instantaneous Cardiac Death    Other Family         Connective Tissue Defect       Social History:  Social History     Socioeconomic History    Marital status:      Spouse name: Not on file    Number of children: Not on file    Years of education: Bachelor's Degree    Highest education level: Not on file   Occupational History    Not on file   Tobacco Use    Smoking status: Former Smoker    Smokeless tobacco: Never Used   Vaping Use    Vaping Use: Former    Quit date: 1/1/2011   Substance and Sexual Activity    Alcohol use: Not Currently     Comment: Occasionally    Drug use: Yes     Types: Marijuana     Comment: medical marijuana    Sexual activity: Not on file   Other Topics Concern    Not on file   Social History Narrative    Daily coffee consumption: 7 cups/day    Per [employer]: Currently      Social Determinants of Health     Financial Resource Strain: Not on file   Food Insecurity: Not on file   Transportation Needs: Not on file   Physical Activity: Not on file   Stress: Not on file   Social Connections: Not on file   Intimate Partner Violence: Not on file   Housing Stability: Not on file         Objective     Vitals:    02/02/22 0753   BP: 110/60   BP Location: Left arm   Patient Position: Sitting   Cuff Size: Standard   Pulse: 72   Resp: 16   Temp: 97 6 °F (36 4 °C)   TempSrc: Temporal   SpO2: 98%   Weight: 87 1 kg (192 lb)   Height: 6' 2" (1 88 m)       Wt Readings from Last 3 Encounters:   02/02/22 87 1 kg (192 lb)   01/28/22 86 2 kg (190 lb)   08/18/21 83 1 kg (183 lb 3 2 oz)       Physical Exam  Vitals and nursing note reviewed  Constitutional:       General: He is not in acute distress  Appearance: Normal appearance  He is well-developed  He is not ill-appearing  HENT:      Head: Normocephalic and atraumatic  Eyes:      Conjunctiva/sclera: Conjunctivae normal    Neck:      Vascular: No carotid bruit  Cardiovascular:      Rate and Rhythm: Normal rate and regular rhythm  Heart sounds: Normal heart sounds   No murmur heard       Pulmonary:      Effort: Pulmonary effort is normal  No respiratory distress  Breath sounds: Normal breath sounds  No wheezing or rales  Abdominal:      General: Bowel sounds are normal  There is no distension or abdominal bruit  Musculoskeletal:         General: Normal range of motion  Cervical back: Neck supple  Comments: Antalgic gait  Patient ambulates slowly with cane  Neurological:      General: No focal deficit present  Mental Status: He is alert and oriented to person, place, and time  Cranial Nerves: No cranial nerve deficit     Psychiatric:         Behavior: Behavior normal           Pertinent Laboratory/Diagnostic Studies:    Lab Results   Component Value Date    WBC 14 12 (H) 02/02/2022    HGB 14 4 02/02/2022    HCT 44 5 02/02/2022    MCV 97 02/02/2022     02/02/2022       No results found for: TSH    Lab Results   Component Value Date    CHOL 200 12/18/2015     Lab Results   Component Value Date    TRIG 74 02/02/2022     Lab Results   Component Value Date    HDL 37 (L) 02/02/2022     Lab Results   Component Value Date    LDLCALC 102 (H) 02/02/2022     Lab Results   Component Value Date    HGBA1C 5 2 02/02/2022     Lab Results   Component Value Date    SODIUM 136 02/02/2022    K 5 0 02/02/2022     02/02/2022    CO2 30 02/02/2022    ANIONGAP 8 12/18/2015    AGAP 2 (L) 02/02/2022    BUN 21 02/02/2022    CREATININE 0 78 02/02/2022    GLUC 88 05/13/2021    GLUF 76 02/02/2022    CALCIUM 9 0 02/02/2022    AST 19 02/02/2022    ALT 18 02/02/2022    ALKPHOS 65 02/02/2022    PROT 7 1 12/18/2015    TP 7 9 02/02/2022    BILITOT 0 54 12/18/2015    TBILI 0 54 02/02/2022    EGFR 95 02/02/2022       Orders Placed This Encounter   Procedures    CBC and differential    Comprehensive metabolic panel    Lipid Panel with Direct LDL reflex    TSH, 3rd generation    Hemoglobin A1C       ALLERGIES:  Allergies   Allergen Reactions    Gluten Meal - Food Allergy Other (See Comments)     UNKNOWN       Current Medications     Current Outpatient Medications   Medication Sig Dispense Refill    acetaminophen (TYLENOL) 325 mg tablet Take 3 tablets (975 mg total) by mouth every 8 (eight) hours 1 Bottle 0    aspirin 81 MG tablet Take 81 mg by mouth daily   CANNABIDIOL PO Take by mouth        gabapentin (NEURONTIN) 600 MG tablet Take 1 tablet (600 mg total) by mouth 2 (two) times a day 60 tablet 5    levETIRAcetam (KEPPRA) 500 mg tablet Take 2 tablets (1,000 mg total) by mouth every 12 (twelve) hours 360 tablet 3    meloxicam (MOBIC) 15 mg tablet Take 1 tablet once a day after food as needed for low back pain 30 tablet 5    rosuvastatin (CRESTOR) 10 MG tablet Take 1 tablet (10 mg total) by mouth daily 90 tablet 3    Calcium Carbonate-Vitamin D 600-200 MG-UNIT TABS Calcium + D3 600-200 MG-UNIT Oral Tablet    Refills: 0       Active (Patient not taking: Reported on 8/18/2021)      Cholecalciferol (VITAMIN D3) 2000 units TABS Take 2,000 Units by mouth daily (Patient not taking: Reported on 1/28/2022 )      Omega-3 Fatty Acids (FISH OIL) 1200 MG CAPS Take by mouth daily (Patient not taking: Reported on 1/28/2022 )       No current facility-administered medications for this visit  There are no discontinued medications      Health Maintenance     Health Maintenance   Topic Date Due    SLP PLAN OF CARE  Never done    HIV Screening  Never done    Influenza Vaccine (1) 09/01/2021    Medicare Annual Wellness Visit (AWV)  05/11/2022    Depression Remission PHQ  05/11/2022    BMI: Adult  02/02/2023    Colorectal Cancer Screening  02/17/2023    DTaP,Tdap,and Td Vaccines (2 - Td or Tdap) 11/14/2030    Hepatitis C Screening  Completed    COVID-19 Vaccine  Completed    Pneumococcal Vaccine: Pediatrics (0 to 5 Years) and At-Risk Patients (6 to 59 Years)  Aged Out    HIB Vaccine  Aged Out    Hepatitis B Vaccine  Aged Out    IPV Vaccine  Aged Out    Hepatitis A Vaccine Aged Out    Meningococcal ACWY Vaccine  Aged Out    HPV Vaccine  Aged Out       Immunization History   Administered Date(s) Administered    COVID-19 MODERNA VACC 0 5 ML IM 03/25/2021, 04/22/2021, 11/04/2021    Influenza, recombinant, quadrivalent,injectable, preservative free 11/11/2020    Influenza, seasonal, injectable 10/09/2014, 09/16/2015    Tdap 11/14/2020    influenza, injectable, quadrivalent 10/29/2019       Dina Aguero MD

## 2022-02-03 ENCOUNTER — TELEPHONE (OUTPATIENT)
Dept: FAMILY MEDICINE CLINIC | Facility: CLINIC | Age: 67
End: 2022-02-03

## 2022-02-03 DIAGNOSIS — D72.829 LEUKOCYTOSIS, UNSPECIFIED TYPE: Primary | ICD-10-CM

## 2022-02-03 NOTE — TELEPHONE ENCOUNTER
Please contact patient  All his blood work was normal aside from elevated white blood cell count  I saw him in the office yesterday and he offered no complaints of cold, cough or fever  Please make sure he is up-to-date with his dentist   Elevated white blood cell count could signal an underlying infection or just be a blood disorder  and should be followed closely  Patient should repeat blood work, CBC with diff next week  I will place orders      Thank you

## 2022-02-06 NOTE — ASSESSMENT & PLAN NOTE
· Intermittent symptoms of anterior chest wall pain, radiating to upper extremities, nonexertional, no associated dyspnea, diaphoresis, dizziness or palpitations  · Patient had recent follow-up with [facility]  Dr Causey recommends nuclear stress test   I reprinted orders for patient  and strongly advised him to proceed      · Blood pressure is well controlled, he remains on statin and aspirin

## 2022-02-09 ENCOUNTER — APPOINTMENT (OUTPATIENT)
Dept: LAB | Facility: CLINIC | Age: 67
End: 2022-02-09
Payer: COMMERCIAL

## 2022-02-09 ENCOUNTER — TELEPHONE (OUTPATIENT)
Dept: FAMILY MEDICINE CLINIC | Facility: CLINIC | Age: 67
End: 2022-02-09

## 2022-02-09 DIAGNOSIS — D72.829 LEUKOCYTOSIS, UNSPECIFIED TYPE: ICD-10-CM

## 2022-02-09 LAB
BASOPHILS # BLD AUTO: 0.07 THOUSANDS/ΜL (ref 0–0.1)
BASOPHILS NFR BLD AUTO: 1 % (ref 0–1)
EOSINOPHIL # BLD AUTO: 0.11 THOUSAND/ΜL (ref 0–0.61)
EOSINOPHIL NFR BLD AUTO: 2 % (ref 0–6)
ERYTHROCYTE [DISTWIDTH] IN BLOOD BY AUTOMATED COUNT: 12.3 % (ref 11.6–15.1)
HCT VFR BLD AUTO: 41.9 % (ref 36.5–49.3)
HGB BLD-MCNC: 13.3 G/DL (ref 12–17)
IMM GRANULOCYTES # BLD AUTO: 0.02 THOUSAND/UL (ref 0–0.2)
IMM GRANULOCYTES NFR BLD AUTO: 0 % (ref 0–2)
LYMPHOCYTES # BLD AUTO: 1.69 THOUSANDS/ΜL (ref 0.6–4.47)
LYMPHOCYTES NFR BLD AUTO: 25 % (ref 14–44)
MCH RBC QN AUTO: 30.5 PG (ref 26.8–34.3)
MCHC RBC AUTO-ENTMCNC: 31.7 G/DL (ref 31.4–37.4)
MCV RBC AUTO: 96 FL (ref 82–98)
MONOCYTES # BLD AUTO: 0.59 THOUSAND/ΜL (ref 0.17–1.22)
MONOCYTES NFR BLD AUTO: 9 % (ref 4–12)
NEUTROPHILS # BLD AUTO: 4.3 THOUSANDS/ΜL (ref 1.85–7.62)
NEUTS SEG NFR BLD AUTO: 63 % (ref 43–75)
NRBC BLD AUTO-RTO: 0 /100 WBCS
PLATELET # BLD AUTO: 219 THOUSANDS/UL (ref 149–390)
PMV BLD AUTO: 11.3 FL (ref 8.9–12.7)
RBC # BLD AUTO: 4.36 MILLION/UL (ref 3.88–5.62)
WBC # BLD AUTO: 6.78 THOUSAND/UL (ref 4.31–10.16)

## 2022-02-09 PROCEDURE — 36415 COLL VENOUS BLD VENIPUNCTURE: CPT

## 2022-02-09 PROCEDURE — 85025 COMPLETE CBC W/AUTO DIFF WBC: CPT

## 2022-02-09 NOTE — TELEPHONE ENCOUNTER
----- Message from Guillermina Capps MD sent at 2/9/2022  2:58 PM EST -----  Please contact patient  Blood work is normal on recheck    Thank you

## 2022-04-14 DIAGNOSIS — M51.36 DDD (DEGENERATIVE DISC DISEASE), LUMBAR: ICD-10-CM

## 2022-04-14 RX ORDER — GABAPENTIN 600 MG/1
TABLET ORAL
Qty: 60 TABLET | Refills: 5 | Status: SHIPPED | OUTPATIENT
Start: 2022-04-14

## 2022-05-25 DIAGNOSIS — E78.5 HYPERLIPIDEMIA, UNSPECIFIED HYPERLIPIDEMIA TYPE: ICD-10-CM

## 2022-05-25 RX ORDER — ROSUVASTATIN CALCIUM 10 MG/1
TABLET, COATED ORAL
Qty: 90 TABLET | Refills: 3 | Status: SHIPPED | OUTPATIENT
Start: 2022-05-25

## 2022-07-12 DIAGNOSIS — M51.36 DDD (DEGENERATIVE DISC DISEASE), LUMBAR: ICD-10-CM

## 2022-07-12 RX ORDER — MELOXICAM 15 MG/1
TABLET ORAL
Qty: 90 TABLET | Refills: 1 | Status: SHIPPED | OUTPATIENT
Start: 2022-07-12 | End: 2022-09-21 | Stop reason: SDUPTHER

## 2022-09-02 ENCOUNTER — OFFICE VISIT (OUTPATIENT)
Dept: FAMILY MEDICINE CLINIC | Facility: CLINIC | Age: 67
End: 2022-09-02
Payer: COMMERCIAL

## 2022-09-02 VITALS
BODY MASS INDEX: 24.74 KG/M2 | HEIGHT: 74 IN | DIASTOLIC BLOOD PRESSURE: 82 MMHG | HEART RATE: 74 BPM | RESPIRATION RATE: 16 BRPM | OXYGEN SATURATION: 99 % | WEIGHT: 192.8 LBS | SYSTOLIC BLOOD PRESSURE: 120 MMHG | TEMPERATURE: 97.6 F

## 2022-09-02 DIAGNOSIS — M51.36 DDD (DEGENERATIVE DISC DISEASE), LUMBAR: ICD-10-CM

## 2022-09-02 DIAGNOSIS — F33.9 DEPRESSION, RECURRENT (HCC): Chronic | ICD-10-CM

## 2022-09-02 DIAGNOSIS — Z00.00 MEDICARE ANNUAL WELLNESS VISIT, SUBSEQUENT: Primary | ICD-10-CM

## 2022-09-02 DIAGNOSIS — I71.21 ANEURYSM OF ASCENDING AORTA: ICD-10-CM

## 2022-09-02 DIAGNOSIS — G40.209 COMPLEX PARTIAL SEIZURE EVOLVING TO GENERALIZED SEIZURE (HCC): Chronic | ICD-10-CM

## 2022-09-02 DIAGNOSIS — R07.2 CHEST PAIN, PRECORDIAL: ICD-10-CM

## 2022-09-02 DIAGNOSIS — E78.5 HYPERLIPIDEMIA, UNSPECIFIED HYPERLIPIDEMIA TYPE: Chronic | ICD-10-CM

## 2022-09-02 PROCEDURE — 3288F FALL RISK ASSESSMENT DOCD: CPT | Performed by: FAMILY MEDICINE

## 2022-09-02 PROCEDURE — 3725F SCREEN DEPRESSION PERFORMED: CPT | Performed by: FAMILY MEDICINE

## 2022-09-02 PROCEDURE — G0439 PPPS, SUBSEQ VISIT: HCPCS | Performed by: FAMILY MEDICINE

## 2022-09-02 PROCEDURE — 99214 OFFICE O/P EST MOD 30 MIN: CPT | Performed by: FAMILY MEDICINE

## 2022-09-02 PROCEDURE — 1003F LEVEL OF ACTIVITY ASSESS: CPT | Performed by: FAMILY MEDICINE

## 2022-09-02 NOTE — PROGRESS NOTES
Assessment and Plan:     Problem List Items Addressed This Visit        Cardiovascular and Mediastinum    Aneurysm of ascending aorta ([facility] )      cardiology follows  CT chest 12/2021: 4 3 cm ascending aortic aneurysm, essentially unchanged since a CT from 11/14/2020  Nervous and Auditory    Complex partial seizure evolving to generalized seizure ([facility] ) (Chronic)     Patient is under care of [facility] neurology  He is due for annual follow-up  Continue Keppra  Musculoskeletal and Integument    DDD (degenerative disc disease), lumbar (Chronic)     Patient has restarted gabapentin 600 mg twice a day            Other    Hyperlipidemia (Chronic)     Continue Crestor 10 mg daily, most recent lipid panel in February revealed good control         Depression, recurrent (HCC) (Chronic)     Patient has declined medications on numerous occasions including today  Ongoing family  related stress  Patient believes that he is able to cope well  Chest pain, precordial     Patient reports recurrence of precordial chest pain back in June  He attributed those symptoms to dehydration and heat  Reportedly patient experienced symptoms of near-syncope after bike riding at that time, he did not seek medical care  Patient reports no recurrences of chest pain since  He continues with low-impact aerobic activity including exercises at home and walking  No exertional symptoms or lightheadedness since  I strongly advised patient to proceed with nuclear stress test that was ordered by his cardiologist back in January of 2022  I reprinted orders and provided him with Central scheduling number  I emphasized importance of this cardiac testing on both cardiology and my behalf           Other Visit Diagnoses     Medicare annual wellness visit, subsequent    -  Primary           Preventive health issues were discussed with patient, and age appropriate screening tests were ordered as noted in patient's After Visit Summary  Personalized health advice and appropriate referrals for health education or preventive services given if needed, as noted in patient's After Visit Summary  History of Present Illness:     Patient presents for a Medicare Wellness Visit    Follow up chronic medical conditions  Episode of chest tightness and fainting sensation back in late June  Patient suspects that his symptoms were related to "heat stroke" as he was dehydrated during bike ride  Patient also realize that he has been off gabapentin 3 to 4 weeks preceding that episode  Reportedly he has been dealing with some legal trouble due to threats that he made of other phone call  Patient is scheduled for hearing in November  No recurrences of chest tightness or near  fainting sensation since  Patient has not been riding the bike due to heat  Exercises at home daily, walks  Hoping to start biking soon with fall weather  Just realized that he ran out of gabapentin  3-4 weeks prior to this episode  Last OV with neurology  8/2021   3 vaccines for COVID, patient would like to proceed with booster  His suspects having COVID in late December but never tested himself  Patient had unremarkable blood work back in February 2022  Medications updated  He remains on Crestor, gabapentin, aspirin and Keppra  He admits struggling with chronic depression, ongoing family related stress  He repetitively declines antidepressants  Patient Care Team:  Roro Dawson MD as PCP - General  Roro Dawson MD as PCP - [address] [place], [place] [address]Th [facility]  SRIRAM Lindsey MD Guerry Showers, DO  MD Jazmine Morataya, DO  MD Melany Razo, DO     Review of Systems:     Review of Systems   Constitutional: Negative  HENT: Negative  Eyes: Negative  Respiratory: Negative  Cardiovascular: Negative  Gastrointestinal: Negative  Endocrine: Negative  Genitourinary: Negative  Musculoskeletal: Negative  Skin: Negative  Allergic/Immunologic: Negative  Neurological: Negative  Hematological: Negative  Psychiatric/Behavioral: Negative           Problem List:     Patient Active Problem List   Diagnosis    DDD (degenerative disc disease), cervical    Complex partial seizure evolving to generalized seizure ([facility] )    Chronic pain disorder    DDD (degenerative disc disease), lumbar    Hyperlipidemia    Hypertension    Aneurysm of ascending aorta ([facility] [facility] )    Polyneuropathy    Medical marijuana use    H/O total ankle replacement, right    History of failed arthroplasty of ankle    Depression, recurrent ([facility] [facility] )    Tremor    Chest pain, precordial      Past Medical and Surgical History:     Past Medical History:   Diagnosis Date    Aneurysm, ascending aorta ([facility] [facility] )     Anxiety disorder     Arthritis     Last assessed: 11/17/16    Asthma     DDD (degenerative disc disease), cervical     Depression     Hyperlipidemia     Hypertension     Multiple fractures of ribs, left side, init for clos fx 11/15/2020    Seizures ([facility] )     Stroke syndrome      Past Surgical History:   Procedure Laterality Date    ANKLE SURGERY      COLONOSCOPY  2009    Due 2014    HERNIA REPAIR      ROOT CANAL      TOTAL HIP ARTHROPLASTY Left 02/2014    TOTAL HIP ARTHROPLASTY Right       Family History:     Family History   Problem Relation Age of Onset    Anxiety disorder Mother         Symptom/disorder    Hypertension Mother         Benign Essential    Mitral valve prolapse Mother         Echo/Systolic    PABLO disease Mother     Fibromyalgia Mother     Hyperlipidemia Mother     Diabetes Father         Mellitus    Cancer Brother     Stroke Family         CVA    Cancer Family     Sudden death Family         Instantaneous Cardiac Death    Other Family         Connective Tissue Defect      Social History:     Social History     Socioeconomic History    Marital status:      Spouse name: None    Number of children: None    Years of education: Bachelor's Degree    Highest education level: None   Occupational History    None   Tobacco Use    Smoking status: Former Smoker    Smokeless tobacco: Never Used   Vaping Use    Vaping Use: Former    Quit date: 1/1/2011   Substance and Sexual Activity    Alcohol use: Not Currently     Comment: Occasionally    Drug use: Yes     Types: Marijuana     Comment: medical marijuana    Sexual activity: None   Other Topics Concern    None   Social History Narrative    Daily coffee consumption: 7 cups/day    Per [employer]: Currently      Social Determinants of Health     Financial Resource Strain: Low Risk     Difficulty of Paying Living Expenses: Not hard at all   Food Insecurity: Not on file   Transportation Needs: No Transportation Needs    Lack of Transportation (Medical): No    Lack of Transportation (Non-Medical): No   Physical Activity: Not on file   Stress: Not on file   Social Connections: Not on file   Intimate Partner Violence: Not on file   Housing Stability: Not on file      Medications and Allergies:     Current Outpatient Medications   Medication Sig Dispense Refill    acetaminophen (TYLENOL) 325 mg tablet Take 3 tablets (975 mg total) by mouth every 8 (eight) hours 1 Bottle 0    aspirin 81 MG tablet Take 81 mg by mouth daily        CANNABIDIOL PO Take by mouth        gabapentin (NEURONTIN) 600 MG tablet TAKE ONE TABLET BY MOUTH TWICE A DAY 60 tablet 5    levETIRAcetam (KEPPRA) 500 mg tablet TAKE TWO TABLETS BY MOUTH EVERY 12 HOURS 360 tablet 0    meloxicam (MOBIC) 15 mg tablet TAKE ONE TABLET BY MOUTH EVERY DAY AFTER FOOD AS NEEDED FOR LOW BACK PAIN 90 tablet 1    Omega-3 Fatty Acids (FISH OIL) 1200 MG CAPS Take by mouth daily      rosuvastatin (CRESTOR) 10 MG tablet TAKE ONE TABLET BY MOUTH EVERY DAY 90 tablet 3    Calcium Carbonate-Vitamin D 600-200 MG-UNIT TABS Calcium + D3 600-200 MG-UNIT Oral Tablet    Refills: 0       Active (Patient not taking: No sig reported)      Cholecalciferol (VITAMIN D3) 2000 units TABS Take 2,000 Units by mouth daily (Patient not taking: No sig reported)       No current facility-administered medications for this visit  Allergies   Allergen Reactions    Gluten Meal - Food Allergy Other (See Comments)     UNKNOWN      Immunizations:     Immunization History   Administered Date(s) Administered    COVID-19 MODERNA VACC 0 5 ML IM 03/25/2021, 04/22/2021, 11/04/2021    Influenza, recombinant, quadrivalent,injectable, preservative free 11/11/2020    Influenza, seasonal, injectable 10/09/2014, 09/16/2015    Tdap 11/14/2020    influenza, injectable, quadrivalent 10/29/2019      Health Maintenance:         Topic Date Due    HIV Screening  Never done    Colorectal Cancer Screening  02/17/2023    Hepatitis C Screening  Completed         Topic Date Due    COVID-19 Vaccine (4 - Booster for Moderna series) 03/04/2022    Pneumococcal Vaccine: 65+ Years (1 - PCV) Never done    Influenza Vaccine (1) 09/01/2022      Medicare Screening Tests and Risk Assessments:     Onofre Cantu is here for his Subsequent Wellness visit  Health Risk Assessment:   Patient rates overall health as good  Patient feels that their physical health rating is same  Patient is satisfied with their life  Eyesight was rated as same  Hearing was rated as same  Patient feels that their emotional and mental health rating is same  Patients states they are sometimes angry  Patient states they are sometimes unusually tired/fatigued  Pain experienced in the last 7 days has been some  Patient's pain rating has been 3/10  Patient states that he has experienced no weight loss or gain in last 6 months  Depression Screening:   PHQ-9 Score: 0      Fall Risk Screening:    In the past year, patient has experienced: history of falling in past year    Number of falls: 2 or more  Injured during fall?: No Feels unsteady when standing or walking?: No    Worried about falling?: No      Home Safety:  Patient has trouble with stairs inside or outside of their home  Patient has working smoke alarms and has no working carbon monoxide detector  Home safety hazards include: none  Nutrition:   Current diet is Regular  Medications:   Patient is currently taking over-the-counter supplements  OTC medications include: see medication list  Patient is able to manage medications  Activities of Daily Living (ADLs)/Instrumental Activities of Daily Living (IADLs):   Walk and transfer into and out of bed and chair?: Yes  Dress and groom yourself?: Yes    Bathe or shower yourself?: Yes    Feed yourself? Yes  Manage your money, pay your bills and track your expenses?: Yes  Make your own meals?: Yes    Do your own shopping?: Yes    Previous Hospitalizations:   Any hospitalizations or ED visits within the last 12 months?: No      Advance Care Planning:   Living will: Yes    Durable POA for healthcare: No    Advanced directive: Yes      Cognitive Screening:   Provider or family/friend/caregiver concerned regarding cognition?: No    PREVENTIVE SCREENINGS      Cardiovascular Screening:    General: Screening Not Indicated and History Lipid Disorder      Diabetes Screening:     General: Screening Current      Colorectal Cancer Screening:     General: Screening Current      Prostate Cancer Screening:    General: Screening Current      Abdominal Aortic Aneurysm (AAA) Screening:    Risk factors include: age between 73-67 yo and tobacco use        Lung Cancer Screening:     General: Screening Not Indicated      Hepatitis C Screening:    General: Screening Current    Screening, Brief Intervention, and Referral to Treatment (SBIRT)    Screening  Typical number of drinks in a day: 0  Typical number of drinks in a week: 0  Interpretation: Low risk drinking behavior      Single Item Drug Screening:  How often have you used an illegal drug (including marijuana) or a prescription medication for non-medical reasons in the past year? never    Single Item Drug Screen Score: 0  Interpretation: Negative screen for possible drug use disorder    Brief Intervention  Alcohol & drug use screenings were reviewed  No concerns regarding substance use disorder identified  Other Counseling Topics:   Regular weightbearing exercise       No exam data present     Physical Exam:     /82 (BP Location: Left arm, Patient Position: Sitting, Cuff Size: Standard)   Pulse 74   Temp 97 6 °F (36 4 °C) (Temporal)   Resp 16   Ht 6' 2" (1 88 m)   Wt 87 5 kg (192 lb 12 8 oz)   SpO2 99%   BMI 24 75 kg/m²     Physical Exam     Suzette Arndt MD

## 2022-09-02 NOTE — ASSESSMENT & PLAN NOTE
[facility] cardiology follows  CT chest 12/2021: 4 3 cm ascending aortic aneurysm, essentially unchanged since a CT from 11/14/2020

## 2022-09-02 NOTE — ASSESSMENT & PLAN NOTE
Patient has declined medications on numerous occasions including today  Ongoing family  related stress  Patient believes that he is able to cope well

## 2022-09-02 NOTE — ASSESSMENT & PLAN NOTE
Patient reports recurrence of precordial chest pain back in June  He attributed those symptoms to dehydration and heat  Reportedly patient experienced symptoms of near-syncope after bike riding at that time, he did not seek medical care  Patient reports no recurrences of chest pain since  He continues with low-impact aerobic activity including exercises at home and walking  No exertional symptoms or lightheadedness since  I strongly advised patient to proceed with nuclear stress test that was ordered by his cardiologist back in January of 2022  I reprinted orders and provided him with Central scheduling number  I emphasized importance of this cardiac testing on both cardiology and my behalf

## 2022-09-02 NOTE — PATIENT INSTRUCTIONS
Medicare Preventive Visit Patient Instructions  Thank you for completing your Welcome to Medicare Visit or Medicare Annual Wellness Visit today  Your next wellness visit will be due in one year (9/3/2023)  The screening/preventive services that you may require over the next 5-10 years are detailed below  Some tests may not apply to you based off risk factors and/or age  Screening tests ordered at today's visit but not completed yet may show as past due  Also, please note that scanned in results may not display below  Preventive Screenings:  Service Recommendations Previous Testing/Comments   Colorectal Cancer Screening  · Colonoscopy    · Fecal Occult Blood Test (FOBT)/Fecal Immunochemical Test (FIT)  · Fecal DNA/Cologuard Test  · Flexible Sigmoidoscopy Age: 39-70 years old   Colonoscopy: every 10 years (May be performed more frequently if at higher risk)  OR  FOBT/FIT: every 1 year  OR  Cologuard: every 3 years  OR  Sigmoidoscopy: every 5 years  Screening may be recommended earlier than age 39 if at higher risk for colorectal cancer  Also, an individualized decision between you and your healthcare provider will decide whether screening between the ages of 74-80 would be appropriate   Colonoscopy: 02/17/2020  FOBT/FIT: 12/18/2021  Cologuard: 02/17/2020  Sigmoidoscopy: Not on file    Screening Current     Prostate Cancer Screening Individualized decision between patient and health care provider in men between ages of 53-78   Medicare will cover every 12 months beginning on the day after your 50th birthday PSA: 2 0 ng/mL           Hepatitis C Screening Once for adults born between 1945 and 1965  More frequently in patients at high risk for Hepatitis C Hep C Antibody: 11/14/2019    Screening Current   Diabetes Screening 1-2 times per year if you're at risk for diabetes or have pre-diabetes Fasting glucose: 76 mg/dL (2/2/2022)  A1C: 5 2 % (2/2/2022)  Screening Current   Cholesterol Screening Once every 5 years if you don't have a lipid disorder  May order more often based on risk factors  Lipid panel: 02/02/2022  Screening Not Indicated  History Lipid Disorder      Other Preventive Screenings Covered by Medicare:  1  Abdominal Aortic Aneurysm (AAA) Screening: covered once if your at risk  You're considered to be at risk if you have a family history of AAA or a male between the age of 73-68 who smoking at least 100 cigarettes in your lifetime  2  Lung Cancer Screening: covers low dose CT scan once per year if you meet all of the following conditions: (1) Age 50-69; (2) No signs or symptoms of lung cancer; (3) Current smoker or have quit smoking within the last 15 years; (4) You have a tobacco smoking history of at least 20 pack years (packs per day x number of years you smoked); (5) You get a written order from a healthcare provider  3  Glaucoma Screening: covered annually if you're considered high risk: (1) You have diabetes OR (2) Family history of glaucoma OR (3)  aged 48 and older OR (3)  American aged 72 and older  3  Osteoporosis Screening: covered every 2 years if you meet one of the following conditions: (1) Have a vertebral abnormality; (2) On glucocorticoid therapy for more than 3 months; (3) Have primary hyperparathyroidism; (4) On osteoporosis medications and need to assess response to drug therapy  5  HIV Screening: covered annually if you're between the age of 12-76  Also covered annually if you are younger than 13 and older than 72 with risk factors for HIV infection  For pregnant patients, it is covered up to 3 times per pregnancy      Immunizations:  Immunization Recommendations   Influenza Vaccine Annual influenza vaccination during flu season is recommended for all persons aged >= 6 months who do not have contraindications   Pneumococcal Vaccine   * Pneumococcal conjugate vaccine = PCV13 (Prevnar 13), PCV15 (Vaxneuvance), PCV20 (Prevnar 20)  * Pneumococcal polysaccharide vaccine = PPSV23 (Pneumovax) Adults 2364 years old: 1-3 doses may be recommended based on certain risk factors  Adults 72 years old: 1-2 doses may be recommended based off what pneumonia vaccine you previously received   Hepatitis B Vaccine 3 dose series if at intermediate or high risk (ex: diabetes, end stage renal disease, liver disease)   Tetanus (Td) Vaccine - COST NOT COVERED BY MEDICARE PART B Following completion of primary series, a booster dose should be given every 10 years to maintain immunity against tetanus  Td may also be given as tetanus wound prophylaxis  Tdap Vaccine - COST NOT COVERED BY MEDICARE PART B Recommended at least once for all adults  For pregnant patients, recommended with each pregnancy  Shingles Vaccine (Shingrix) - COST NOT COVERED BY MEDICARE PART B  2 shot series recommended in those aged 48 and above     Health Maintenance Due:      Topic Date Due    HIV Screening  Never done    Colorectal Cancer Screening  02/17/2023    Hepatitis C Screening  Completed     Immunizations Due:      Topic Date Due    COVID-19 Vaccine (4 - Booster for Moderna series) 03/04/2022    Pneumococcal Vaccine: 65+ Years (1 - PCV) Never done    Influenza Vaccine (1) 09/01/2022     Advance Directives   What are advance directives? Advance directives are legal documents that state your wishes and plans for medical care  These plans are made ahead of time in case you lose your ability to make decisions for yourself  Advance directives can apply to any medical decision, such as the treatments you want, and if you want to donate organs  What are the types of advance directives? There are many types of advance directives, and each state has rules about how to use them  You may choose a combination of any of the following:  · Living will: This is a written record of the treatment you want  You can also choose which treatments you do not want, which to limit, and which to stop at a certain time   This includes surgery, medicine, IV fluid, and tube feedings  · Durable power of  for healthcare [facility]): This is a written record that states who you want to make healthcare choices for you when you are unable to make them for yourself  This person, called a proxy, is usually a family member or a friend  You may choose more than 1 proxy  · Do not resuscitate (DNR) order:  A DNR order is used in case your heart stops beating or you stop breathing  It is a request not to have certain forms of treatment, such as CPR  A DNR order may be included in other types of advance directives  · Medical directive: This covers the care that you want if you are in a coma, near death, or unable to make decisions for yourself  You can list the treatments you want for each condition  Treatment may include pain medicine, surgery, blood transfusions, dialysis, IV or tube feedings, and a ventilator (breathing machine)  · Values history: This document has questions about your views, beliefs, and how you feel and think about life  This information can help others choose the care that you would choose  Why are advance directives important? An advance directive helps you control your care  Although spoken wishes may be used, it is better to have your wishes written down  Spoken wishes can be misunderstood, or not followed  Treatments may be given even if you do not want them  An advance directive may make it easier for your family to make difficult choices about your care  Fall Prevention    Fall prevention  includes ways to make your home and other areas safer  It also includes ways you can move more carefully to prevent a fall  Health conditions that cause changes in your blood pressure, vision, or muscle strength and coordination may increase your risk for falls  Medicines may also increase your risk for falls if they make you dizzy, weak, or sleepy  Fall prevention tips:   · Stand or sit up slowly      · Use assistive devices as directed  · Wear shoes that fit well and have soles that   · Wear a personal alarm  · Stay active  · Manage your medical conditions  Home Safety Tips:  · Add items to prevent falls in the bathroom  · Keep paths clear  · Install bright lights in your home  · Keep items you use often on shelves within reach  · Paint or place reflective tape on the edges of your stairs  © Copyright [employer] 2018 Information is for End User's use only and may not be sold, redistributed or otherwise used for commercial purposes   All illustrations and images included in CareNotes® are the copyrighted property of [employer]  or [address] [place], [place] [address]

## 2022-09-09 ENCOUNTER — HOSPITAL ENCOUNTER (OUTPATIENT)
Dept: NON INVASIVE DIAGNOSTICS | Facility: CLINIC | Age: 67
Discharge: HOME | End: 2022-09-09
Payer: COMMERCIAL

## 2022-09-09 VITALS
OXYGEN SATURATION: 99 % | HEART RATE: 53 BPM | DIASTOLIC BLOOD PRESSURE: 80 MMHG | BODY MASS INDEX: 24.64 KG/M2 | HEIGHT: 74 IN | SYSTOLIC BLOOD PRESSURE: 114 MMHG | WEIGHT: 192 LBS

## 2022-09-09 DIAGNOSIS — E78.5 HYPERLIPIDEMIA, UNSPECIFIED HYPERLIPIDEMIA TYPE: ICD-10-CM

## 2022-09-09 DIAGNOSIS — I71.21 ANEURYSM OF ASCENDING AORTA: ICD-10-CM

## 2022-09-09 DIAGNOSIS — R07.2 CHEST PAIN, PRECORDIAL: ICD-10-CM

## 2022-09-09 LAB
BASELINE ST DEPRESSION: 0 MM
MAX DIASTOLIC BP: 78 MMHG
MAX HEART RATE: 98 BPM
MAX HR PERCENT: 63 %
MAX HR: 98 BPM
MAX PREDICTED HEART RATE: 155 BPM
MAX. SYSTOLIC BP: 132 MMHG
NUC STRESS EJECTION FRACTION: 55 %
PROTOCOL NAME: NORMAL
RATE PRESSURE PRODUCT: NORMAL
REASON FOR TERMINATION: NORMAL
SL CV REST NUCLEAR ISOTOPE DOSE: 10.46 MCI
SL CV STRESS NUCLEAR ISOTOPE DOSE: 31.9 MCI
SL CV STRESS RECOVERY BP: NORMAL MMHG
SL CV STRESS RECOVERY HR: 67 BPM
SL CV STRESS RECOVERY O2 SAT: 99 %
STRESS ANGINA INDEX: 0
STRESS BASELINE BP: NORMAL MMHG
STRESS BASELINE HR: 53 BPM
STRESS DUKE TREADMILL SCORE: 3
STRESS O2 SAT REST: 99 %
STRESS PEAK HR: 98 BPM
STRESS POST EXERCISE DUR MIN: 3 MIN
STRESS POST EXERCISE DUR SEC: 0 SEC
STRESS POST O2 SAT PEAK: 96 %
STRESS POST PEAK BP: 132 MMHG
STRESS ST DEPRESSION: 0 MM
STRESS/REST PERFUSION RATIO: 1
TARGET HR FORMULA: NORMAL
TEST INDICATION: NORMAL
TIME IN EXERCISE PHASE: NORMAL

## 2022-09-09 PROCEDURE — 93016 CV STRESS TEST SUPVJ ONLY: CPT | Performed by: INTERNAL MEDICINE

## 2022-09-09 PROCEDURE — 93017 CV STRESS TEST TRACING ONLY: CPT

## 2022-09-09 PROCEDURE — A9502 TC99M TETROFOSMIN: HCPCS

## 2022-09-09 PROCEDURE — 78452 HT MUSCLE IMAGE SPECT MULT: CPT

## 2022-09-09 PROCEDURE — 78452 HT MUSCLE IMAGE SPECT MULT: CPT | Performed by: INTERNAL MEDICINE

## 2022-09-09 PROCEDURE — 93018 CV STRESS TEST I&R ONLY: CPT | Performed by: INTERNAL MEDICINE

## 2022-09-09 PROCEDURE — G1004 CDSM NDSC: HCPCS

## 2022-09-09 RX ADMIN — REGADENOSON 0.4 MG: 0.08 INJECTION, SOLUTION INTRAVENOUS at 09:25

## 2022-09-12 ENCOUNTER — TELEPHONE (OUTPATIENT)
Dept: CARDIOLOGY CLINIC | Facility: CLINIC | Age: 67
End: 2022-09-12

## 2022-09-12 NOTE — TELEPHONE ENCOUNTER
----- Message from Carri Reddy MD sent at 9/12/2022  9:25 AM EDT -----  Could you please inform this patient that the test is normal

## 2022-09-21 ENCOUNTER — TELEPHONE (OUTPATIENT)
Dept: OTHER | Facility: OTHER | Age: 67
End: 2022-09-21

## 2022-09-21 DIAGNOSIS — M51.36 DDD (DEGENERATIVE DISC DISEASE), LUMBAR: ICD-10-CM

## 2022-09-21 RX ORDER — MELOXICAM 15 MG/1
TABLET ORAL
Qty: 90 TABLET | Refills: 1 | Status: SHIPPED | OUTPATIENT
Start: 2022-09-21

## 2022-09-21 NOTE — TELEPHONE ENCOUNTER
Attempted to return phone call to discuss questions regarding medication  Patient did not answer and cannot leave a message

## 2022-09-21 NOTE — TELEPHONE ENCOUNTER
PALLIATIVE CARE GENERALIST CONSULT NOTE      Patient: Kathy Lebron Date: 3/5/2019   : 1974 Attending: Lawson Aragon MD   45year old male PCP: [facility]   MRN: [id number]  Date of admission: 3/2/2019     Consult requested by MARCO Lombardi NP to assist with evaluation in context of multiple co-morbidities and debility, recurrent hospitalizations. HPI  45year old White male with significant history of CKD III, chronic pain, paraplegia with neurogenic bladder and debility. Schizoaffective disorder. Urosepsis, recurrent UTI, depression, toxic encephalopathy, respiratory failure admitted 3/2/2019 due to delirium, hyperkalemia, undiagnosed sleep apnea. Current Consults:   IP Consult Orders (From admission, onward)    Start     Ordered    19 0806  Inpatient consult to Palliative Care  ONE TIME     Provider:  HAVEN Montoya    19 0805    19 0324  Inpatient consult to Infectious Diseases  ONE TIME     Provider:  Yordy Aguayo MD    19 0324        Previous Hospitalizations: 2019 ARF, 2019 sepsis/recurrent UTI, 2018 sepsis, 2018 pressure injury, 2018 sepsis/UTI. Previous Palliative Care Consults: 2016  LACE Score:  9  Baseline PS/Trajectory: Total lift for transfers, w/c bound. chronic unstageable ulcers, ostomy. Alert though deemed incapacitated. Lethargic, refusing bipap at times. Needs sleep study for CPAP. ADVANCED CARE PLANNING  Decision-Making Capacity not decisional, certification of incapacity signed on date 2018. Advanced Care Planning Document Â· available on EPIC, completed 2015. Â· POAHC: friend Gualberto Mcdonald, mother Jose Calle Document does not  provide additional guidance as to wishes (ie nutrition, hydration, when to consider comfort)   Code Status Preference Â· DNR/DNI    Â· Does current code status align with advance directive? NA     Activation Status Â· [facility] is activated at this time.      ASSESSMENT    At the time of my assessment: Patient is requesting a call back to discuss questions about his medication  Phone consult with activated [facility], meeting with pt. Assessment completed, addressing goals of care. Symptoms Pain: Patient has no indicators of pain  Dyspnea: absent    Anxiety: absent     Current Functional Status Baseline  Palliative Performance Scale:  50 (mainly sits or lies down, unable to do most activity, extensive disease. Needs considerable assistance. Normal or reduced intake. Fully conscious or confused.) and Current  Palliative Performance Scale:  40 (mainly in bed, unable to do most activity, extensive disease. Needs assistance with most care. Normal or reduced intake. Fully conscious, drowsy, or confused.)        Psychosocial, Spiritual, Cultural                              Current living situation: SNF for LTC         Family & support system: Single, 5 children and one brother, mother. Mother and POA are main support. Facility staff. Relevant family medical history:      family history includes Diabetes in his mother; Hypertension in his mother. Habits (tobacco, alcohol, drugs):    reports that he has quit smoking. His smoking use included cigarettes. He has a 5.75 pack-year smoking history. he has never used smokeless tobacco. He reports that he does not drink alcohol or use drugs. Occupation history, interests: Disabled. Spiritual history: No affiliation or Islam. Cultural factors: n/a      Behavioral health: n/a     Insight to Condition and Plan of Care Unable to assess pt due to incapacity and reveals no insight to condition or POC on my visit. Carolyn Whelan is well informed and involved, expresses concerns regarding frequent hospitalizations and ED visits, debility and effects on QOL. Recalls PC consult in 2016 when was considering hospice though pt recovered at that juncture. Patient and Family dynamics Supportive. Carolyn Whelan needs to speak with pt's mother before addressing GOC.        [facility] Providers: PCP, wound care, facility staff. Anticipated change in disposition No, probable return to [email or web address]       COUNSELING AND CARE COORDINATION  Plan of care Discussed with pt, POAHC, OFT, NP. Goals of care TBD pending discussion amongst [facility] and family. Referrals Made none   Time Total encounter time: 30 minutes, with the majority of this time spent counseling and coordinating care. IMPRESSION:   Pt remains vulnerable to frequent hospitalizations due to cyclical exacerbations of his co-morbidities and subsequent acute changes. Hospitalizations and ED visits have become more frequent with shorter durations between over the past 4 months causing concern regarding pt's ability to sustain any meaningful recovery. Non-adherence, recurrent infections, debility, undiagnosed sleep apnea and institutional living appear to be the main contributors and POA is appropriately concerned for pt's tolerance and overall QOL. Evaluation for ventilatory device pending and may serve to reduce pt's risk of readmission. Prior hopes to take pt home have dissipated and Amber Dotson appears realistic regarding the need for long term care. In terms of management and goals of care, she is appropriately conferring with pt's mother (alternate POA) before making any adjustments. PLAN/RECOMMENDATIONS:   -Continue present management; POAs to discuss POC and goals prior to discharge which in anticipate for tomorrow. Rachellejah to contact [facility] to discuss.  -Will relay any changes in [facility] to SNF for care planning as appropriate. Thank you Deandre Rhodes NP and Dr. Vasyl Key for involving the Palliative Service in the care of your patient. Will continue to follow with you. Please call with any questions or concerns.       Antoinette Lapoppy MSW, [address] [facility], [address] [facility] [employer] [facility]  [phone]

## 2022-11-01 DIAGNOSIS — M51.36 DDD (DEGENERATIVE DISC DISEASE), LUMBAR: ICD-10-CM

## 2022-11-01 RX ORDER — GABAPENTIN 600 MG/1
TABLET ORAL
Qty: 60 TABLET | Refills: 5 | Status: SHIPPED | OUTPATIENT
Start: 2022-11-01

## 2022-11-14 DIAGNOSIS — G40.209 COMPLEX PARTIAL SEIZURE EVOLVING TO GENERALIZED SEIZURE (HCC): ICD-10-CM

## 2022-11-15 RX ORDER — LEVETIRACETAM 500 MG/1
TABLET ORAL
Qty: 360 TABLET | Refills: 3 | Status: SHIPPED | OUTPATIENT
Start: 2022-11-15

## 2022-12-01 ENCOUNTER — OFFICE VISIT (OUTPATIENT)
Dept: NEUROLOGY | Facility: CLINIC | Age: 67
End: 2022-12-01

## 2022-12-01 VITALS
WEIGHT: 206 LBS | HEIGHT: 74 IN | HEART RATE: 67 BPM | BODY MASS INDEX: 26.44 KG/M2 | SYSTOLIC BLOOD PRESSURE: 134 MMHG | DIASTOLIC BLOOD PRESSURE: 79 MMHG

## 2022-12-01 DIAGNOSIS — R25.1 TREMOR: Primary | ICD-10-CM

## 2022-12-01 DIAGNOSIS — E78.5 HYPERLIPIDEMIA, UNSPECIFIED HYPERLIPIDEMIA TYPE: Chronic | ICD-10-CM

## 2022-12-01 NOTE — ASSESSMENT & PLAN NOTE
Reports that tremor is about the same as it was at his last visit about one year ago  No tremor was witnessed in the office today  Recommended monitoring for worsening

## 2022-12-01 NOTE — ASSESSMENT & PLAN NOTE
Patient with epilepsy in the setting of prior stroke and reported TBI in the past  He is currently on levetiracetam 1000 mg BID without side effects or concerns  No prior MRI brain or EEG reports available for review  There have been no clear seizures since his last visit despite reporting being off of his levetiracetam for about one month  He does report being more irritable and anxious when he was off of his medication  See refill record below from [facility]  Patient will continue with levetiracetam 1000 mg BID  If there are breakthrough seizures, his dose could certainly be increased  If patient presents to the ER with breakthrough seizure, recommend checking levetiracetam level to confirm compliance  Patient does not drive and has not driven for 2 years  Patient will call the office with seizures, issues or concerns  Follow up in 1 year or sooner if needed

## 2022-12-01 NOTE — PATIENT INSTRUCTIONS
- Continue levetiracetam 1000 mg twice per day  - Call PCP about losing your crestor (rosuvastatin) - Dr Rosanna Garcia  - Call our office with any breakthrough seizures or concerns  - Speak to your  about what information you may need for us or if they need to request records regarding your upcoming court case  - Follow up in 1 year or sooner if needd    - If you ever lose medication in the future, call the office to let us know so we can provide refills

## 2022-12-01 NOTE — ASSESSMENT & PLAN NOTE
Reports that he lost his crestor bottle recently  Reviewed refills, just picked up yesterday  Recommended checking at home and if it is not there to call PCP to request another refill

## 2022-12-01 NOTE — PROGRESS NOTES
Review of Systems   Constitutional: Negative  Negative for appetite change and fever  HENT: Negative  Negative for hearing loss, tinnitus, trouble swallowing and voice change  Eyes: Negative  Negative for photophobia, pain and visual disturbance  Respiratory: Negative  Negative for shortness of breath  Cardiovascular: Negative  Negative for palpitations  Gastrointestinal: Negative  Negative for nausea and vomiting  Endocrine: Negative  Negative for cold intolerance  Genitourinary: Negative  Negative for dysuria, frequency and urgency  Musculoskeletal: Positive for gait problem (patient had a pretty hard fall)  Negative for myalgias and neck pain  Skin: Negative  Negative for rash  Allergic/Immunologic: Negative  Neurological: Positive for seizures  Negative for dizziness, tremors, syncope, facial asymmetry, speech difficulty, weakness, light-headedness, numbness and headaches  Hematological: Negative  Does not bruise/bleed easily  Psychiatric/Behavioral: Negative  Negative for confusion, hallucinations and sleep disturbance  All other systems reviewed and are negative

## 2022-12-01 NOTE — PROGRESS NOTES
Patient ID: Dennis Barrientos is a 74 y o  male with prior stroke and epilepsy, who is returning to Neurology office for follow up of his seizures  Assessment/Plan:    Complex partial seizure evolving to generalized seizure [facility]  Patient with epilepsy in the setting of prior stroke and reported TBI in the past  He is currently on levetiracetam 1000 mg BID without side effects or concerns  No prior MRI brain or EEG reports available for review  There have been no clear seizures since his last visit despite reporting being off of his levetiracetam for about one month  He does report being more irritable and anxious when he was off of his medication  See ref[facility] record below from [facility]  Patient will continue with levetiracetam 1000 mg BID  If there are breakthrough seizures, his dose could certainly be increased  If patient presents to the ER with breakthrough seizure, recommend checking levetiracetam level to confirm compliance  Patient does not drive and has not driven for 2 years  Patient will call the office with seizures, issues or concerns  Follow up in 1 year or sooner if needed  Tremor  Reports that tremor is about the same as it was at his last visit about one year ago  No tremor was witnessed in the office today  Recommended monitoring for worsening  Hyperlipidemia  Reports that he lost his crestor bottle recently  Reviewed refills, just picked up yesterday  Recommended checking at home and if it is not there to call PCP to request another refill  He will Return in about 1 year (around 12/1/2023) for Follow up with Dr Landry Pat  Subjective:  Dennis Barrientos is a 74 y o  male with prior stroke, TBI,  and epilepsy, who is returning to Neurology office for follow up of his seizures  He was last seen in the office on 8/9/21 by Dr Landry Pat  At that time, there were no seizures since prior appointment  He was tolerating levetiracetam well without issues or concerns   Recommended continuing with levetiracetam 1000 mg BID and to follow up in 1 year  There was a tremor noted by patient but not observed by Dr Vania Bird in the office  Since his last visit, he reports that there was a period of time where he was not taking his medication for seizures  Reports that he knocked his medication bottle off of the counter by accident and did not realized he was not taking it  There was a day that he had to walk to 2 [place] that day and he was not feeling well and thought he was going to have a stroke  He was going to call 911 and did not  He then saw a commercial on TV about Trung Phillips and he was very angry about this  He was going to e-mail him but decided to call to leave a message with his name, address, and phone number and said if he ever sees him he will rip his head off but he won't get a pardon for that like he did  Notes that he did not plan on doing this and thought he was going to have a stroke  Thought this was the last hope for Brit  He fell asleep and the police came to his house and took him to [facility]  He has been to court twice and has to go again in January  He would like something to explain what would happen if he went without medications for one month  He reports that he had confusion and anxiety, he googled that could happen if he did not take his medication  He also had a fight with his friend that week and was very tense  Feels that this was due to him not taking his medication for one month  Reports that this occurred on June 26th  He reports that he got it refilled the day after he got out of [facility], the following day  He did not have any seizures during that time but he was very anxious and focused on issues with his family  When he has a seizure he gets confused, feels unsure  Reports that he never had any full body seizures (generalized tonic clonic seizures)  Since he has been on his medication he has not had a single one   He is back on his levetiracetam now and is feeling better  Other than the above, he has been doing okay  He does have a , Christopher Solo  He is sleeping okay now, he was not sleeping when he was not taking his medication  He reports that it was not on purpose that he did not take his medication  He did have a fall a few days before thanksgiving while putting up [employer]  His ankle gave out and he fell  He is feeling better now  Tremor he reported at last visit continues to occur intermittently  Not any worse than last visit  If he pays attention he can make it stop  Not occurring at time of office visit  Current seizure medications:  - levetiracetam 1000 mg BID  - gabapentin 600 mg BID - neuropathy per PCP  Other medications as per Epic  Levetiracetam refills:   Dispensed Days Supply Quantity Provider Pharmacy   LEVETIRACETAM 500MG TABS 11/15/2022 90 360 each Ken Davila MD [facility] [phone] -     LEVETIRACETAM 500MG TABS 08/10/2022 90 360 each [facility] [phone] -     LEVETIRACETAM 500MG TABS 05/09/2022 90 360 each Jimena [place] 1948 -     LEVETIRACETAM 500MG TABS 01/25/2022 90 360 each Jimena Graciliano [place] 1948 -     LEVETIRACETAM 500MG TABS 10/25/2021 90 360 each Jimena Graciliano [place] 1948 -     LEVETIRACETAM 500MG TABS 07/14/2021 90 360 each Jimena Graciliano [place] 1948 -                  Prior Seizure Medications: Phenytoin (stopped in the late 80s due to prolonged period of seizure freedom)     I reviewed prior neurology notes, most recent labs, [facility] and summarized above  Objective:    Blood pressure 134/79, pulse 67, height 6' 2" (1 88 m), weight 93 4 kg (206 lb)  Physical Exam  No apparent distress  Appears well nourished  Mood appropriate for situation     Neurologic Exam  Mental status- alert and oriented to person, place, and time  Speech appropriate for conversation   Good attention and knowledge  Cranial Nerves- PERRL, EOMS normal, facial sensation and muscles symmetric, hearing intact bilaterally to finger rubs, tongue midline, palate rise symmetrical, shoulder shrug symmetrical     Motor- No pronator drift  Appropriate strength  Moves all extremities freely  No tremor  Sensory-  Intact distally in all extremities to light touch  DTRs- 2+ and symmetric in all extremities  Gait- normal casual gait  Normal tandem gait  Negative rhomberg  Coordination- FNF intact  ROS:    Review of Systems   Constitutional: Negative  Negative for appetite change and fever  HENT: Negative  Negative for hearing loss, tinnitus, trouble swallowing and voice change  Eyes: Negative  Negative for photophobia, pain and visual disturbance  Respiratory: Negative  Negative for shortness of breath  Cardiovascular: Negative  Negative for palpitations  Gastrointestinal: Negative  Negative for nausea and vomiting  Endocrine: Negative  Negative for cold intolerance  Genitourinary: Negative  Negative for dysuria, frequency and urgency  Musculoskeletal: Positive for gait problem (patient had a pretty hard fall)  Negative for myalgias and neck pain  Skin: Negative  Negative for rash  Allergic/Immunologic: Negative  Neurological:  Negative for dizziness, tremors, syncope, facial asymmetry, speech difficulty, weakness, light-headedness, numbness and headaches  Hematological: Negative  Does not bruise/bleed easily  Psychiatric/Behavioral: Negative  Negative for confusion, hallucinations and sleep disturbance  All other systems reviewed and are negative       ROS obtained by MA and reviewed by myself  This note may have been created using voice recognition software  There may be unintentional errors such as grammatical errors, spelling errors, or pronoun errors

## 2023-02-27 DIAGNOSIS — M51.36 DDD (DEGENERATIVE DISC DISEASE), LUMBAR: ICD-10-CM

## 2023-02-28 RX ORDER — MELOXICAM 15 MG/1
TABLET ORAL
Qty: 90 TABLET | Refills: 1 | Status: SHIPPED | OUTPATIENT
Start: 2023-02-28

## 2023-03-03 ENCOUNTER — RA CDI HCC (OUTPATIENT)
Dept: OTHER | Facility: HOSPITAL | Age: 68
End: 2023-03-03

## 2023-03-03 NOTE — PROGRESS NOTES
[facility]  coding opportunities       Chart reviewed, no opportunity found:   [address]        Patients Insurance     Medicare Insurance: [employer]

## 2023-03-09 ENCOUNTER — OFFICE VISIT (OUTPATIENT)
Dept: FAMILY MEDICINE CLINIC | Facility: CLINIC | Age: 68
End: 2023-03-09

## 2023-03-09 VITALS
TEMPERATURE: 97.8 F | DIASTOLIC BLOOD PRESSURE: 78 MMHG | HEIGHT: 74 IN | BODY MASS INDEX: 24.56 KG/M2 | RESPIRATION RATE: 16 BRPM | WEIGHT: 191.4 LBS | OXYGEN SATURATION: 98 % | SYSTOLIC BLOOD PRESSURE: 118 MMHG | HEART RATE: 69 BPM

## 2023-03-09 DIAGNOSIS — G62.9 POLYNEUROPATHY: Primary | ICD-10-CM

## 2023-03-09 DIAGNOSIS — I71.21 ANEURYSM OF ASCENDING AORTA WITHOUT RUPTURE: ICD-10-CM

## 2023-03-09 DIAGNOSIS — F33.9 DEPRESSION, RECURRENT (HCC): Chronic | ICD-10-CM

## 2023-03-09 DIAGNOSIS — Z12.5 PROSTATE CANCER SCREENING: ICD-10-CM

## 2023-03-09 DIAGNOSIS — Z23 NEED FOR PNEUMOCOCCAL VACCINATION: ICD-10-CM

## 2023-03-09 DIAGNOSIS — Z12.11 COLON CANCER SCREENING: ICD-10-CM

## 2023-03-09 DIAGNOSIS — I10 PRIMARY HYPERTENSION: ICD-10-CM

## 2023-03-09 DIAGNOSIS — E78.5 HYPERLIPIDEMIA, UNSPECIFIED HYPERLIPIDEMIA TYPE: Chronic | ICD-10-CM

## 2023-03-09 DIAGNOSIS — G40.209 COMPLEX PARTIAL SEIZURE EVOLVING TO GENERALIZED SEIZURE (HCC): Chronic | ICD-10-CM

## 2023-03-09 DIAGNOSIS — Z23 NEED FOR SHINGLES VACCINE: ICD-10-CM

## 2023-03-09 RX ORDER — CHLORHEXIDINE GLUCONATE 0.12 MG/ML
RINSE ORAL
COMMUNITY
Start: 2023-01-17

## 2023-03-09 NOTE — PROGRESS NOTES
Name: Meghann Blanchard      : 1955      MRN: [id number]  Encounter Provider: Brian Molina MD  Encounter Date: 3/9/2023   Encounter department: [address] [place] [address]     1  Polyneuropathy  -     TSH, 3rd generation; Future  -     CBC and differential; Future  -     Vitamin B12; Future    2  Primary hypertension  Assessment & Plan:  Blood pressure is well controlled, no current Rx    Orders:  -     Comprehensive metabolic panel; Future  -     Lipid panel; Future    3  Prostate cancer screening  -     PSA, Total Screen; Future    4  Hyperlipidemia, unspecified hyperlipidemia type  Assessment & Plan:  Continue Crestor 10 mg daily  Patient will proceed with blood work      5  Complex partial seizure evolving to generalized seizure [facility])  Assessment & Plan:  Patient is under care of [facility] neurology, continue Keppra      6  Need for pneumococcal vaccination  -     Pneumococcal Conjugate Vaccine 20-valent (Pcv20)    7  Need for shingles vaccine  -     Zoster Vaccine Recombinant IM    8  Colon cancer screening  -     Cologuard    9  Aneurysm of ascending aorta without rupture  Assessment & Plan:  [facility] cardiology follows  Patient will be due for CT in 2023      10  Depression, recurrent ([facility] [address] )  Assessment & Plan:  Chronic symptoms of depression, family related stress  Patient has firmly declined antidepressants  Return in about 6 months (around 2023) for follow up,Shingrix#2  Subjective     Follow-up chronic medical conditions  Patient has been feeling stably  He follows with [facility] cardiology  NST 2022- no ischemia  Cardiology follows thoracic ascending aortic aneurysm: Next CT chest is due 2023    Patient is under ongoing care of neurology, remains on Keppra  Chronic low back and neck pain, he manages with gabapentin and meloxicam   He reports ongoing family related stress leading to chronic depression and anxiety    Patient has declined medications for anxiety and depression  He believes his symptoms are entirely situational and he feels that he is coping generally well  Review of Systems   Constitutional: Negative  HENT: Negative  Respiratory: Negative  Cardiovascular: Negative  Gastrointestinal: Negative  Endocrine: Negative  Genitourinary: Negative  Musculoskeletal: Positive for arthralgias, back pain and neck pain  Neurological: Negative  Psychiatric/Behavioral: Positive for dysphoric mood  The patient is nervous/anxious          Past Medical History:   Diagnosis Date   • Aneurysm, ascending aorta    • Anxiety disorder    • Arthritis     Last assessed: 11/17/16   • Asthma    • DDD (degenerative disc disease), cervical    • Depression    • Hyperlipidemia    • Hypertension    • Multiple fractures of ribs, left side, init for clos fx 11/15/2020   • Seizures ([facility] [address] )    • Stroke syndrome      Past Surgical History:   Procedure Laterality Date   • ANKLE SURGERY     • COLONOSCOPY  2009    Due 2014   • HERNIA REPAIR     • ROOT CANAL     • TOTAL HIP ARTHROPLASTY Left 02/2014   • TOTAL HIP ARTHROPLASTY Right      Family History   Problem Relation Age of Onset   • Anxiety disorder Mother         Symptom/disorder   • Hypertension Mother         Benign Essential   • Mitral valve prolapse Mother         Echo/Systolic   • PABLO disease Mother    • Fibromyalgia Mother    • Hyperlipidemia Mother    • Diabetes Father         Mellitus   • Cancer Brother    • Stroke Family         CVA   • Cancer Family    • Sudden death Family         Instantaneous Cardiac Death   • Other Family         Connective Tissue Defect     Social History     Socioeconomic History   • Marital status:      Spouse name: None   • Number of children: None   • Years of education: Bachelor's Degree   • Highest education level: None   Occupational History   • None   Tobacco Use   • Smoking status: Former   • Smokeless tobacco: Never   Vaping Use   • Vaping Use: Former   • Quit date: 1/1/2011   Substance and Sexual Activity   • Alcohol use: Not Currently     Comment: Occasionally   • Drug use: Yes     Types: Marijuana     Comment: medical marijuana   • Sexual activity: None   Other Topics Concern   • None   Social History Narrative    Daily coffee consumption: 7 cups/day    Per [employer]: Currently      Social Determinants of Health     Financial Resource Strain: Low Risk    • Difficulty of Paying Living Expenses: Not hard at all   Food Insecurity: Not on file   Transportation Needs: No Transportation Needs   • Lack of Transportation (Medical): No   • Lack of Transportation (Non-Medical): No   Physical Activity: Not on file   Stress: Not on file   Social Connections: Not on file   Intimate Partner Violence: Not on file   Housing Stability: Not on file     Current Outpatient Medications on File Prior to Visit   Medication Sig   • acetaminophen (TYLENOL) 325 mg tablet Take 3 tablets (975 mg total) by mouth every 8 (eight) hours   • aspirin 81 MG tablet Take 81 mg by mouth daily     • CANNABIDIOL PO Take by mouth     • chlorhexidine (PERIDEX) 0 12 % solution RINSE WITH 1/2 FLUID OUNCE TWICE A DAY FOR 30 SECONDS AFTER MEALS FOR 21 DAYS   • gabapentin (NEURONTIN) 600 MG tablet TAKE ONE TABLET BY MOUTH TWICE A DAY   • levETIRAcetam (KEPPRA) 500 mg tablet TAKE TWO TABLETS BY MOUTH EVERY 12 HOURS   • meloxicam (MOBIC) 15 mg tablet TAKE ONE TABLET BY MOUTH EVERY DAY AFTER FOOD AS NEEDED FOR LOW BACK PAIN   • Omega-3 Fatty Acids (FISH OIL) 1200 MG CAPS Take by mouth daily   • rosuvastatin (CRESTOR) 10 MG tablet TAKE ONE TABLET BY MOUTH EVERY DAY   • Calcium Carbonate-Vitamin D 600-200 MG-UNIT TABS Calcium + D3 600-200 MG-UNIT Oral Tablet    Refills: 0       Active (Patient not taking: No sig reported)   • Cholecalciferol (VITAMIN D3) 2000 units TABS Take 2,000 Units by mouth daily (Patient not taking: Reported on 1/28/2022)     Allergies   Allergen Reactions   • Gluten Meal - Food Allergy Other (See Comments)     UNKNOWN     Immunization History   Administered Date(s) Administered   • COVID-19 MODERNA VACC 0 5 ML IM 03/25/2021, 04/22/2021, 11/04/2021   • INFLUENZA 10/05/2022   • Influenza, recombinant, quadrivalent,injectable, preservative free 11/11/2020   • Influenza, seasonal, injectable 10/09/2014, 09/16/2015   • Pneumococcal Conjugate Vaccine 20-valent (Pcv20), Polysace 03/09/2023   • Tdap 11/14/2020   • Zoster Vaccine Recombinant 03/09/2023   • influenza, injectable, quadrivalent 10/29/2019       Objective     /78 (BP Location: Left arm, Patient Position: Sitting, Cuff Size: Large)   Pulse 69   Temp 97 8 °F (36 6 °C) (Temporal)   Resp 16   Ht 6' 2" (1 88 m)   Wt 86 8 kg (191 lb 6 4 oz)   SpO2 98%   BMI 24 57 kg/m²     Physical Exam  Vitals and nursing note reviewed  Constitutional:       General: He is not in acute distress  Appearance: Normal appearance  He is well-developed  He is not ill-appearing  HENT:      Head: Normocephalic and atraumatic  Eyes:      Conjunctiva/sclera: Conjunctivae normal    Neck:      Vascular: No carotid bruit  Cardiovascular:      Rate and Rhythm: Normal rate and regular rhythm  Heart sounds: Normal heart sounds  No murmur heard  Pulmonary:      Effort: Pulmonary effort is normal  No respiratory distress  Breath sounds: Normal breath sounds  No wheezing or rales  Abdominal:      General: Bowel sounds are normal  There is no distension or abdominal bruit  Musculoskeletal:      Cervical back: Neck supple  Right lower leg: No edema  Left lower leg: No edema  Comments: Antalgic gait   Neurological:      General: No focal deficit present  Mental Status: He is alert and oriented to person, place, and time  Cranial Nerves: No cranial nerve deficit  Psychiatric:         Attention and Perception: Attention normal          Mood and Affect: Mood is depressed           Speech: Speech normal          Behavior: Behavior normal          Thought Content:  Thought content normal          Cognition and Memory: Cognition normal        Mingo Capps MD

## 2023-03-13 ENCOUNTER — APPOINTMENT (OUTPATIENT)
Dept: LAB | Facility: CLINIC | Age: 68
End: 2023-03-13

## 2023-03-13 DIAGNOSIS — G62.9 POLYNEUROPATHY: ICD-10-CM

## 2023-03-13 DIAGNOSIS — Z12.5 PROSTATE CANCER SCREENING: ICD-10-CM

## 2023-03-13 DIAGNOSIS — I10 PRIMARY HYPERTENSION: ICD-10-CM

## 2023-03-13 LAB
ALBUMIN SERPL BCP-MCNC: 3.9 G/DL (ref 3.5–5)
ALP SERPL-CCNC: 54 U/L (ref 46–116)
ALT SERPL W P-5'-P-CCNC: 18 U/L (ref 12–78)
ANION GAP SERPL CALCULATED.3IONS-SCNC: 1 MMOL/L (ref 4–13)
AST SERPL W P-5'-P-CCNC: 20 U/L (ref 5–45)
BASOPHILS # BLD AUTO: 0.07 THOUSANDS/ÂΜL (ref 0–0.1)
BASOPHILS NFR BLD AUTO: 1 % (ref 0–1)
BILIRUB SERPL-MCNC: 0.29 MG/DL (ref 0.2–1)
BUN SERPL-MCNC: 27 MG/DL (ref 5–25)
CALCIUM SERPL-MCNC: 9 MG/DL (ref 8.3–10.1)
CHLORIDE SERPL-SCNC: 111 MMOL/L (ref 96–108)
CHOLEST SERPL-MCNC: 137 MG/DL
CO2 SERPL-SCNC: 26 MMOL/L (ref 21–32)
CREAT SERPL-MCNC: 0.73 MG/DL (ref 0.6–1.3)
EOSINOPHIL # BLD AUTO: 0.31 THOUSAND/ÂΜL (ref 0–0.61)
EOSINOPHIL NFR BLD AUTO: 6 % (ref 0–6)
ERYTHROCYTE [DISTWIDTH] IN BLOOD BY AUTOMATED COUNT: 13.1 % (ref 11.6–15.1)
GFR SERPL CREATININE-BSD FRML MDRD: 97 ML/MIN/1.73SQ M
GLUCOSE P FAST SERPL-MCNC: 77 MG/DL (ref 65–99)
HCT VFR BLD AUTO: 43 % (ref 36.5–49.3)
HDLC SERPL-MCNC: 34 MG/DL
HGB BLD-MCNC: 13.5 G/DL (ref 12–17)
IMM GRANULOCYTES # BLD AUTO: 0.01 THOUSAND/UL (ref 0–0.2)
IMM GRANULOCYTES NFR BLD AUTO: 0 % (ref 0–2)
LDLC SERPL CALC-MCNC: 79 MG/DL (ref 0–100)
LYMPHOCYTES # BLD AUTO: 1.35 THOUSANDS/ÂΜL (ref 0.6–4.47)
LYMPHOCYTES NFR BLD AUTO: 24 % (ref 14–44)
MCH RBC QN AUTO: 30.7 PG (ref 26.8–34.3)
MCHC RBC AUTO-ENTMCNC: 31.4 G/DL (ref 31.4–37.4)
MCV RBC AUTO: 98 FL (ref 82–98)
MONOCYTES # BLD AUTO: 0.59 THOUSAND/ÂΜL (ref 0.17–1.22)
MONOCYTES NFR BLD AUTO: 11 % (ref 4–12)
NEUTROPHILS # BLD AUTO: 3.25 THOUSANDS/ÂΜL (ref 1.85–7.62)
NEUTS SEG NFR BLD AUTO: 58 % (ref 43–75)
NONHDLC SERPL-MCNC: 103 MG/DL
NRBC BLD AUTO-RTO: 0 /100 WBCS
PLATELET # BLD AUTO: 219 THOUSANDS/UL (ref 149–390)
PMV BLD AUTO: 11.8 FL (ref 8.9–12.7)
POTASSIUM SERPL-SCNC: 4.3 MMOL/L (ref 3.5–5.3)
PROT SERPL-MCNC: 6.8 G/DL (ref 6.4–8.4)
PSA SERPL-MCNC: 4 NG/ML (ref 0–4)
RBC # BLD AUTO: 4.4 MILLION/UL (ref 3.88–5.62)
SODIUM SERPL-SCNC: 138 MMOL/L (ref 135–147)
TRIGL SERPL-MCNC: 119 MG/DL
TSH SERPL DL<=0.05 MIU/L-ACNC: 2.4 UIU/ML (ref 0.45–4.5)
VIT B12 SERPL-MCNC: 659 PG/ML (ref 100–900)
WBC # BLD AUTO: 5.58 THOUSAND/UL (ref 4.31–10.16)

## 2023-03-15 ENCOUNTER — TELEPHONE (OUTPATIENT)
Dept: FAMILY MEDICINE CLINIC | Facility: CLINIC | Age: 68
End: 2023-03-15

## 2023-03-15 DIAGNOSIS — R97.20 INCREASED PROSTATE SPECIFIC ANTIGEN (PSA) VELOCITY: Primary | ICD-10-CM

## 2023-03-15 NOTE — TELEPHONE ENCOUNTER
Please contact patient  I reviewed results of recent blood work  Prostate test, PSA, went up from 2 0 to 4 0  It is still technically within normal limits but has increased quite a bit since our last testing  · Patient should proceed with repeat PSA testing in 1 month, orders placed, no need to fast  · Patient should schedule consultation with [facility] u[facility], orders placed      Thank you

## 2023-03-16 ENCOUNTER — TELEPHONE (OUTPATIENT)
Dept: UROLOGY | Facility: AMBULATORY SURGERY CENTER | Age: 68
End: 2023-03-16

## 2023-03-16 NOTE — TELEPHONE ENCOUNTER
Please Triage  New Patient    What is the reason for the patient’s appointment? Referral R97 20 (ICD-10-CM) - Increased prostate specific antigen (PSA) velocity    Pt states that he has no pain or discomfort     What office location does the patient prefer? [place]    Imaging/Lab Results: lab     Do we accept the patient's insurance or is the patient Self-Pay? Insurance Provider: [employer]   Plan Type/Number:  Member ID#: Has the patient had any previous Urologist(s)? Yes RANDAL     Have patient records been requested? If not are records showing in Epic:  Epic     Has the patient had any outside testing done? Does the patient have a personal history of cancer?  no

## 2023-03-25 LAB — COLOGUARD RESULT REPORTABLE: NEGATIVE

## 2023-04-04 ENCOUNTER — OFFICE VISIT (OUTPATIENT)
Dept: UROLOGY | Facility: AMBULATORY SURGERY CENTER | Age: 68
End: 2023-04-04

## 2023-04-04 VITALS
SYSTOLIC BLOOD PRESSURE: 120 MMHG | HEIGHT: 74 IN | DIASTOLIC BLOOD PRESSURE: 70 MMHG | OXYGEN SATURATION: 97 % | WEIGHT: 191 LBS | BODY MASS INDEX: 24.51 KG/M2 | HEART RATE: 62 BPM

## 2023-04-04 DIAGNOSIS — R97.20 INCREASED PROSTATE SPECIFIC ANTIGEN (PSA) VELOCITY: Primary | ICD-10-CM

## 2023-04-04 LAB
SL AMB  POCT GLUCOSE, UA: ABNORMAL
SL AMB LEUKOCYTE ESTERASE,UA: ABNORMAL
SL AMB POCT BILIRUBIN,UA: ABNORMAL
SL AMB POCT BLOOD,UA: ABNORMAL
SL AMB POCT CLARITY,UA: CLEAR
SL AMB POCT COLOR,UA: YELLOW
SL AMB POCT KETONES,UA: ABNORMAL
SL AMB POCT NITRITE,UA: ABNORMAL
SL AMB POCT PH,UA: 7
SL AMB POCT SPECIFIC GRAVITY,UA: 1.01
SL AMB POCT URINE PROTEIN: ABNORMAL
SL AMB POCT UROBILINOGEN: 0.2

## 2023-04-04 NOTE — ASSESSMENT & PLAN NOTE
The pt's PSA is concerning  We discussed the significence and 3 options  - We can recheck a PSA in a short interval to better understand trend/dynamics and if PSA goes down this would be reassuring  - We can obtain an MRI of the prostate which may influence us to pursue a biopsy (if PiRADS >3 lesion) or observation (if PiRADS <3)  - We can move forward with an in office prostate biopsy  I recommend an MRI but the patient preferred to just get a recheck of a PSA in 2 months    If this shows persistent elevated PSA would then move forward with an MRI would likely biopsy to follow

## 2023-04-04 NOTE — PROGRESS NOTES
Assessment/Plan:    Increased prostate specific antigen (PSA) velocity  The pt's PSA is concerning  We discussed the significence and 3 options  - We can recheck a PSA in a short interval to better understand trend/dynamics and if PSA goes down this would be reassuring  - We can obtain an MRI of the prostate which may influence us to pursue a biopsy (if PiRADS >3 lesion) or observation (if PiRADS <3)  - We can move forward with an in office prostate biopsy  I recommend an MRI but the patient preferred to just get a recheck of a PSA in 2 months  If this shows persistent elevated PSA would then move forward with an MRI would likely biopsy to follow          Subjective:      Patient ID: Hilario Amador is a 74 y o  male  HPI    56-year-old male referred for elevated PSA  The patient's PSA in March 2023 was 4 0 from his baseline of 2, last checked in 2021  We attempted DYLLAN today but the patient could not tolerate so exam not performed  There is no family history of prostate cancer  Past medical history is significant for bilateral hip replacement and seizure disorder  Past Surgical History:   Procedure Laterality Date   • ANKLE SURGERY     • COLONOSCOPY  2009    Due 2014   • HERNIA REPAIR     • ROOT CANAL     • TOTAL HIP ARTHROPLASTY Left 02/2014   • TOTAL HIP ARTHROPLASTY Right         Past Medical History:   Diagnosis Date   • Aneurysm, ascending aorta ([address] )    • Anxiety disorder    • Arthritis     Last assessed: 11/17/16   • Asthma    • DDD (degenerative disc disease), cervical    • Depression    • Hyperlipidemia    • Hypertension    • Multiple fractures of ribs, left side, init for clos fx 11/15/2020   • Seizures ([address] )    • Stroke syndrome              Review of Systems   Constitutional: Negative for chills and fever  HENT: Negative for ear pain and sore throat  Eyes: Negative for pain and visual disturbance  Respiratory: Negative for cough and shortness of breath      Cardiovascular: "Negative for chest pain and palpitations  Gastrointestinal: Negative for abdominal pain and vomiting  Genitourinary: Negative for dysuria and hematuria  Musculoskeletal: Negative for arthralgias and back pain  Skin: Negative for color change and rash  Neurological: Negative for seizures and syncope  All other systems reviewed and are negative  Objective:      /70 (BP Location: Left arm, Patient Position: Sitting, Cuff Size: Large)   Pulse 62   Ht 6' 2\" (1 88 m)   Wt 86 6 kg (191 lb)   SpO2 97%   BMI 24 52 kg/m²     Lab Results   Component Value Date    PSA 4 0 03/13/2023    PSA 2 0 05/13/2021    PSA 1 9 11/14/2019    PSA 1 6 12/04/2018    PSA 2 7 10/23/2018    PSA 1 5 09/21/2017    PSA 1 4 08/30/2016    PSA 1 4 06/19/2015    PSA 1 1 05/06/2014          Physical Exam  Constitutional:       Appearance: Normal appearance  HENT:      Head: Normocephalic and atraumatic  Eyes:      Extraocular Movements: Extraocular movements intact  Pupils: Pupils are equal, round, and reactive to light  Cardiovascular:      Rate and Rhythm: Normal rate  Abdominal:      General: Abdomen is flat  There is no distension  Palpations: There is no mass  Tenderness: There is no abdominal tenderness  There is no right CVA tenderness, left CVA tenderness or guarding  Genitourinary:     Comments: Attempted digital rectal exam but the patient could not tolerate so aborted  Musculoskeletal:      Right lower leg: No edema  Left lower leg: No edema  Skin:     General: Skin is warm and dry  Coloration: Skin is not jaundiced  Findings: No bruising  Neurological:      General: No focal deficit present  Mental Status: He is alert and oriented to person, place, and time  Mental status is at baseline  Psychiatric:         Mood and Affect: Mood normal          Thought Content:  Thought content normal          Judgment: Judgment normal            Orders  Orders Placed This " Encounter   Procedures   • PSA Total, Diagnostic     Standing Status:   Future     Standing Expiration Date:   4/4/2024   • POCT urine dip

## 2023-04-17 ENCOUNTER — TELEPHONE (OUTPATIENT)
Dept: FAMILY MEDICINE CLINIC | Facility: CLINIC | Age: 68
End: 2023-04-17

## 2023-04-17 NOTE — TELEPHONE ENCOUNTER
Good Morning Margaretann Boston called said that you sent him to get test done with Dr David Nixon they tried 4 times and it was unsuccessful pt couldn't do Dr Elvia Hendrickson told him to get prostrate blood work in one mth can u please give him the script to get done    Thank Kilo Cason

## 2023-05-01 NOTE — TELEPHONE ENCOUNTER
Spoke with patient and advised the Dr Chelsie Helm reviewed note and Dr Marta Collado had ordered the PSA test   He just wanted to make sure you are aware and he will have the psa done again

## 2023-05-02 ENCOUNTER — APPOINTMENT (OUTPATIENT)
Dept: LAB | Facility: CLINIC | Age: 68
End: 2023-05-02

## 2023-05-02 DIAGNOSIS — R97.20 INCREASED PROSTATE SPECIFIC ANTIGEN (PSA) VELOCITY: ICD-10-CM

## 2023-05-02 LAB — PSA SERPL-MCNC: 2.4 NG/ML (ref 0–4)

## 2023-05-08 ENCOUNTER — TELEPHONE (OUTPATIENT)
Dept: OTHER | Facility: OTHER | Age: 68
End: 2023-05-08

## 2023-05-08 DIAGNOSIS — Z12.5 SCREENING FOR PROSTATE CANCER: Primary | ICD-10-CM

## 2023-05-08 NOTE — TELEPHONE ENCOUNTER
Recent PSA from 5/2/2023 now 2 4, previously 4 0 (3/13/23)  PSA has returned closer to his baseline  Recommend follow-up in 6 months with repeat PSA

## 2023-05-08 NOTE — TELEPHONE ENCOUNTER
Called Kwaku back and notifie him that he just has to have a PSA repeated in 6 months    He did not wish to schedule that appointment with us at this time but PSa was placed in system

## 2023-05-08 NOTE — TELEPHONE ENCOUNTER
Patient wanted to update Dr Vanegas Shown that he recently got his bloodwork done again and he states his PSI has gone down to 2 4 and wanted the doctors input on this  Please call patient back to discuss

## 2023-05-30 DIAGNOSIS — M51.36 DDD (DEGENERATIVE DISC DISEASE), LUMBAR: ICD-10-CM

## 2023-05-30 RX ORDER — MELOXICAM 15 MG/1
TABLET ORAL
Qty: 90 TABLET | Refills: 1 | Status: SHIPPED | OUTPATIENT
Start: 2023-05-30

## 2023-05-30 RX ORDER — GABAPENTIN 600 MG/1
TABLET ORAL
Qty: 60 TABLET | Refills: 5 | Status: SHIPPED | OUTPATIENT
Start: 2023-05-30

## 2023-06-15 ENCOUNTER — TELEPHONE (OUTPATIENT)
Dept: UROLOGY | Facility: AMBULATORY SURGERY CENTER | Age: 68
End: 2023-06-15

## 2023-06-15 ENCOUNTER — APPOINTMENT (OUTPATIENT)
Dept: LAB | Facility: CLINIC | Age: 68
End: 2023-06-15
Payer: COMMERCIAL

## 2023-06-15 ENCOUNTER — TELEPHONE (OUTPATIENT)
Dept: FAMILY MEDICINE CLINIC | Facility: CLINIC | Age: 68
End: 2023-06-15

## 2023-06-15 DIAGNOSIS — M51.36 DDD (DEGENERATIVE DISC DISEASE), LUMBAR: ICD-10-CM

## 2023-06-15 DIAGNOSIS — I10 ESSENTIAL HYPERTENSION: ICD-10-CM

## 2023-06-15 DIAGNOSIS — Z12.5 SCREENING FOR PROSTATE CANCER: ICD-10-CM

## 2023-06-15 LAB — PSA SERPL-MCNC: 3.36 NG/ML (ref 0–4)

## 2023-06-15 PROCEDURE — 36415 COLL VENOUS BLD VENIPUNCTURE: CPT

## 2023-06-15 PROCEDURE — G0103 PSA SCREENING: HCPCS

## 2023-06-15 RX ORDER — MELOXICAM 15 MG/1
TABLET ORAL
Qty: 90 TABLET | Refills: 1 | Status: SHIPPED | OUTPATIENT
Start: 2023-06-15

## 2023-06-15 NOTE — TELEPHONE ENCOUNTER
Medication Refill Request   Name lisinopril (ZESTRIL) 2 5 mg tablet [[id number]]     Dose/Frequency Take 1 tablet (2 5 mg total) by mouth daily  Quantity 90  Verified pharmacy   [x]  Verified ordering Provider   [x]  Does patient have enough for the next 3 days? Yes [] No [x]      Medication Refill Request     Name levETIRAcetam (KEPPRA) 500 mg tablet    Dose/Frequency TAKE TWO TABLETS BY MOUTH EVERY 12 HOURS  Quantity 360  Verified pharmacy   [x]  Verified ordering Provider   [x]  Does patient have enough for the next 3 days?  Yes [] No [x]

## 2023-06-15 NOTE — TELEPHONE ENCOUNTER
Pharmacy calling, states patient told them he has been taking meloxicam twice a day and has been out for weeks  Directions say once a day but pt told them he's going to take them twice a day after he eats, they wanted to relay this to provider      Refill will not be covered by insurance until 6/17 per pharmacy

## 2023-06-15 NOTE — TELEPHONE ENCOUNTER
Patient calling in with questions on his upcoming apt  Patient was made aware that blood work does need to be done for this visit  Once patient was made aware he hung up phone

## 2023-06-16 ENCOUNTER — TELEPHONE (OUTPATIENT)
Dept: UROLOGY | Facility: AMBULATORY SURGERY CENTER | Age: 68
End: 2023-06-16

## 2023-06-16 DIAGNOSIS — R97.20 INCREASED PROSTATE SPECIFIC ANTIGEN (PSA) VELOCITY: Primary | ICD-10-CM

## 2023-06-16 RX ORDER — LEVETIRACETAM 500 MG/1
500 TABLET, EXTENDED RELEASE ORAL 2 TIMES DAILY
Refills: 0 | OUTPATIENT
Start: 2023-06-16

## 2023-06-16 RX ORDER — LISINOPRIL 2.5 MG/1
2.5 TABLET ORAL DAILY
Qty: 90 TABLET | Refills: 0 | OUTPATIENT
Start: 2023-06-16

## 2023-06-16 NOTE — TELEPHONE ENCOUNTER
Called patient as per mina just wanted to see what was the reason for the visit today  Carri Ontiveros last note stated that he should follow up in 6 months with a PSA  Pt stated that he does not have any urinary symptoms as of now and mina stated that his PSA is 3 36 and that she does recommend him being seen in 6 months  PT stated he agrees because it is really hard for him to get a rid to his appts  So I rescheduled his appt for 11/24/23 @1:00pm in the [place] office with Teresita Rose  Pt confirmed his appt and is aware to get his PSA completed prior to that appt date  PSA is in the system  But his appt for todays was canceled

## 2023-06-16 NOTE — TELEPHONE ENCOUNTER
Please contact patient  I received refill requests and messages from his pharmacy    · Refill for lisinopril was denied as this medication was discontinued a while ago  · Prescription for Levetiracem ( Keppra) should be refilled by his neurology  · Meloxicam/Mobic, should be taken once a day only after food  It is maximum allowed dose    Patient should follow prescription instructions    Thank you

## 2023-06-30 DIAGNOSIS — E78.5 HYPERLIPIDEMIA, UNSPECIFIED HYPERLIPIDEMIA TYPE: ICD-10-CM

## 2023-06-30 RX ORDER — ROSUVASTATIN CALCIUM 10 MG/1
TABLET, COATED ORAL
Qty: 90 TABLET | Refills: 3 | Status: SHIPPED | OUTPATIENT
Start: 2023-06-30

## 2023-09-14 ENCOUNTER — OFFICE VISIT (OUTPATIENT)
Dept: FAMILY MEDICINE CLINIC | Facility: CLINIC | Age: 68
End: 2023-09-14
Payer: COMMERCIAL

## 2023-09-14 VITALS
WEIGHT: 188 LBS | BODY MASS INDEX: 24.13 KG/M2 | RESPIRATION RATE: 16 BRPM | DIASTOLIC BLOOD PRESSURE: 80 MMHG | HEIGHT: 74 IN | OXYGEN SATURATION: 95 % | HEART RATE: 61 BPM | TEMPERATURE: 97.2 F | SYSTOLIC BLOOD PRESSURE: 122 MMHG

## 2023-09-14 DIAGNOSIS — E78.2 MIXED HYPERLIPIDEMIA: Chronic | ICD-10-CM

## 2023-09-14 DIAGNOSIS — R97.20 INCREASED PROSTATE SPECIFIC ANTIGEN (PSA) VELOCITY: ICD-10-CM

## 2023-09-14 DIAGNOSIS — I10 PRIMARY HYPERTENSION: ICD-10-CM

## 2023-09-14 DIAGNOSIS — Z00.00 MEDICARE ANNUAL WELLNESS VISIT, SUBSEQUENT: Primary | ICD-10-CM

## 2023-09-14 DIAGNOSIS — F33.9 DEPRESSION, RECURRENT (HCC): Chronic | ICD-10-CM

## 2023-09-14 DIAGNOSIS — I71.21 ANEURYSM OF ASCENDING AORTA WITHOUT RUPTURE (HCC): ICD-10-CM

## 2023-09-14 DIAGNOSIS — M51.36 DDD (DEGENERATIVE DISC DISEASE), LUMBAR: Chronic | ICD-10-CM

## 2023-09-14 DIAGNOSIS — Z23 ENCOUNTER FOR IMMUNIZATION: ICD-10-CM

## 2023-09-14 DIAGNOSIS — Z23 INFLUENZA VACCINE NEEDED: ICD-10-CM

## 2023-09-14 PROCEDURE — 90750 HZV VACC RECOMBINANT IM: CPT

## 2023-09-14 PROCEDURE — G0008 ADMIN INFLUENZA VIRUS VAC: HCPCS

## 2023-09-14 PROCEDURE — 90471 IMMUNIZATION ADMIN: CPT

## 2023-09-14 PROCEDURE — G0439 PPPS, SUBSEQ VISIT: HCPCS | Performed by: FAMILY MEDICINE

## 2023-09-14 PROCEDURE — 99214 OFFICE O/P EST MOD 30 MIN: CPT | Performed by: FAMILY MEDICINE

## 2023-09-14 PROCEDURE — 90662 IIV NO PRSV INCREASED AG IM: CPT

## 2023-09-14 RX ORDER — METHYLPREDNISOLONE 4 MG/1
TABLET ORAL
Qty: 21 EACH | Refills: 0 | Status: SHIPPED | OUTPATIENT
Start: 2023-09-14 | End: 2023-09-22 | Stop reason: ALTCHOICE

## 2023-09-14 NOTE — PROGRESS NOTES
Assessment and Plan:     Problem List Items Addressed This Visit        Cardiovascular and Mediastinum    Hypertension    Relevant Orders    Comprehensive metabolic panel    CBC    Aneurysm of ascending aorta [facility])     Patient is due for CT chest in late December  Orders provided            Musculoskeletal and Integument    DDD (degenerative disc disease), lumbar (Chronic)     Persistent low back pain with sciatica. Continue gabapentin  Previous imaging reveals multilevel disc disease/spondylosis. Referral to [facility] [place]'s orthopedic/spine surgery for further evaluation  Start Medrol Dosepak for symptom control now         Relevant Medications    methylPREDNISolone 4 MG tablet therapy pack    Other Relevant Orders    Ambulatory referral to Orthopedic Surgery       Other    Hyperlipidemia (Chronic)     Crestor daily, monitor labs         Relevant Orders    Comprehensive metabolic panel    CBC    Depression, recurrent (HCC) (Chronic)     Symptoms are chronic and overall stable  Patient declines Rx         Increased prostate specific antigen (PSA) velocity     [facility]        Other Visit Diagnoses     Medicare annual wellness visit, subsequent    -  Primary    Influenza vaccine needed        Relevant Orders    influenza vaccine, high-dose, PF 0.7 mL (FLUZONE HIGH-DOSE) (Completed)    Encounter for immunization        Relevant Orders    Zoster Vaccine Recombinant IM (Completed)         . Return in about 6 months (around 3/14/2024) for follow up. Preventive health issues were discussed with patient, and age appropriate screening tests were ordered as noted in patient's After Visit Summary. Personalized health advice and appropriate referrals for health education or preventive services given if needed, as noted in patient's After Visit Summary. History of Present Illness:     Patient presents for a Medicare Wellness Visit    Follow-up/Medicare wellness.   Patient is due for second dose of Shingrix today. He has been feeling generally stably. He is complaining of persistent low back pain with left sciatica. Ambulates with a limp. No recurrences of seizure. He remains on Keppra. No complaints of chest pain, palpitations, shortness of breath or dizziness. He remains on Crestor for hyperlipidemia. Blood pressure has been well controlled with no medications. Patient Care Team:  Tameka Cavazos MD as PCP - General  Tameka Cavazos MD as PCP - [address] [place] [address] (RTE)  SRIRAM Dugan MD Stevphen Amato, DO Tim London, MD Conrado Bile, DO Pearlene Quam, MD Priscille Grow, DO     Review of Systems:     Review of Systems   Constitutional: Negative. HENT: Negative. Eyes: Negative. Respiratory: Negative. Cardiovascular: Negative. Gastrointestinal: Negative. Endocrine: Negative. Genitourinary: Negative. Musculoskeletal: Positive for arthralgias, back pain and gait problem. Allergic/Immunologic: Negative. Hematological: Negative. Psychiatric/Behavioral: Positive for dysphoric mood.         Problem List:     Patient Active Problem List   Diagnosis   • DDD (degenerative disc disease), cervical   • Complex partial seizure evolving to generalized seizure ([address])   • Chronic pain disorder   • DDD (degenerative disc disease), lumbar   • Hyperlipidemia   • Hypertension   • Aneurysm of ascending aorta (HCC)   • Polyneuropathy   • Increased prostate specific antigen (PSA) velocity   • Medical marijuana use   • H/O total ankle replacement, right   • History of failed arthroplasty of ankle   • Depression, recurrent ([address])   • Tremor      Past Medical and Surgical History:     Past Medical History:   Diagnosis Date   • Aneurysm, ascending aorta ([address])    • Anxiety disorder    • Arthritis     Last assessed: 11/17/16   • Asthma    • DDD (degenerative disc disease), cervical    • Depression    • Hyperlipidemia • Hypertension    • Multiple fractures of ribs, left side, init for clos fx 11/15/2020   • Seizures ([address])    • Stroke syndrome      Past Surgical History:   Procedure Laterality Date   • ANKLE SURGERY     • COLONOSCOPY  2009    Due 2014   • HERNIA REPAIR     • ROOT CANAL     • TOTAL HIP ARTHROPLASTY Left 02/2014   • TOTAL HIP ARTHROPLASTY Right       Family History:     Family History   Problem Relation Age of Onset   • Anxiety disorder Mother         Symptom/disorder   • Hypertension Mother         Benign Essential   • Mitral valve prolapse Mother         Echo/Systolic   • PABLO disease Mother    • Fibromyalgia Mother    • Hyperlipidemia Mother    • Diabetes Father         Mellitus   • Cancer Brother    • Stroke Family         CVA   • Cancer Family    • Sudden death Family         Instantaneous Cardiac Death   • Other Family         Connective Tissue Defect      Social History:     Social History     Socioeconomic History   • Marital status:      Spouse name: None   • Number of children: None   • Years of education: Bachelor's Degree   • Highest education level: None   Occupational History   • None   Tobacco Use   • Smoking status: Former   • Smokeless tobacco: Never   Vaping Use   • Vaping Use: Former   • Quit date: 1/1/2011   Substance and Sexual Activity   • Alcohol use: Not Currently     Comment: Occasionally   • Drug use: Yes     Types: Marijuana     Comment: medical marijuana   • Sexual activity: None   Other Topics Concern   • None   Social History Narrative    Daily coffee consumption: 7 cups/day    Per [employer]: Currently      Social Determinants of Health     Financial Resource Strain: Low Risk  (9/14/2023)    Overall Financial Resource Strain (CARDIA)    • Difficulty of Paying Living Expenses: Not very hard   Food Insecurity: Not on file   Transportation Needs: No Transportation Needs (9/14/2023)    PRAPARE - Transportation    • Lack of Transportation (Medical):  No    • Lack of Transportation (Non-Medical): No   Physical Activity: Not on file   Stress: Not on file   Social Connections: Not on file   Intimate Partner Violence: Not on file   Housing Stability: Not on file      Medications and Allergies:     Current Outpatient Medications   Medication Sig Dispense Refill   • acetaminophen (TYLENOL) 325 mg tablet Take 3 tablets (975 mg total) by mouth every 8 (eight) hours 1 Bottle 0   • aspirin 81 MG tablet Take 81 mg by mouth daily. • Calcium Carbonate-Vitamin D 600-200 MG-UNIT TABS Calcium + D3 600-200 MG-UNIT Oral Tablet    Refills: 0       Active     • CANNABIDIOL PO Take by mouth       • chlorhexidine (PERIDEX) 0.12 % solution RINSE WITH 1/2 FLUID OUNCE TWICE A DAY FOR 30 SECONDS AFTER MEALS FOR 21 DAYS     • Cholecalciferol (VITAMIN D3) 2000 units TABS Take 2,000 Units by mouth daily     • gabapentin (NEURONTIN) 600 MG tablet TAKE ONE TABLET BY MOUTH TWICE A DAY 60 tablet 5   • levETIRAcetam (KEPPRA) 500 mg tablet TAKE TWO TABLETS BY MOUTH EVERY 12 HOURS 360 tablet 3   • meloxicam (MOBIC) 15 mg tablet TAKE ONE TABLET BY MOUTH EVERY DAY AFTER FOOD AS NEEDED FOR LOW BACK PAIN 90 tablet 1   • methylPREDNISolone 4 MG tablet therapy pack Use as directed on package 21 each 0   • Omega-3 Fatty Acids (FISH OIL) 1200 MG CAPS Take by mouth daily     • rosuvastatin (CRESTOR) 10 MG tablet TAKE ONE TABLET BY MOUTH EVERY DAY 90 tablet 3     No current facility-administered medications for this visit.      Allergies   Allergen Reactions   • Gluten Meal - Food Allergy Other (See Comments)     UNKNOWN      Immunizations:     Immunization History   Administered Date(s) Administered   • COVID-19 MODERNA VACC 0.5 ML IM 03/25/2021, 04/22/2021, 11/04/2021   • INFLUENZA 10/05/2022   • Influenza, high dose seasonal 0.7 mL 09/14/2023   • Influenza, recombinant, quadrivalent,injectable, preservative free 11/11/2020   • Influenza, seasonal, injectable 10/09/2014, 09/16/2015   • Pneumococcal Conjugate Vaccine 20-valent (Pcv20), Polysace 03/09/2023   • Tdap 11/14/2020   • Zoster Vaccine Recombinant 03/09/2023, 09/14/2023   • influenza, injectable, quadrivalent 10/29/2019      Health Maintenance:         Topic Date Due   • Colorectal Cancer Screening  03/20/2026   • Hepatitis C Screening  Completed         Topic Date Due   • COVID-19 Vaccine (4 - [place] series) 12/30/2021      Medicare Screening Tests and Risk Assessments:     Gina Mead is here for his Subsequent Wellness visit. Last Medicare Wellness visit information reviewed, patient interviewed and updates made to the record today. Health Risk Assessment:   Patient rates overall health as fair. Patient feels that their physical health rating is same. Patient is satisfied with their life. Eyesight was rated as slightly worse. Hearing was rated as same. Patient feels that their emotional and mental health rating is same. Patients states they are sometimes angry. Patient states they are sometimes unusually tired/fatigued. Pain experienced in the last 7 days has been some. Patient's pain rating has been 6/10. Patient states that he has experienced no weight loss or gain in last 6 months. Depression Screening:   PHQ-9 Score: 2      Fall Risk Screening: In the past year, patient has experienced: history of falling in past year    Number of falls: 1  Injured during fall?: No    Feels unsteady when standing or walking?: No    Worried about falling?: No      Home Safety:  Patient has trouble with stairs inside or outside of their home. Patient has working smoke alarms and has no working carbon monoxide detector. Home safety hazards include: none. Nutrition:   Current diet is Regular. Medications:   Patient is currently taking over-the-counter supplements. OTC medications include: see medication list. Patient is able to manage medications.      Activities of Daily Living (ADLs)/Instrumental Activities of Daily Living (IADLs):   Walk and transfer into and out of bed and chair?: Yes  Dress and groom yourself?: Yes    Bathe or shower yourself?: Yes    Feed yourself? Yes  Manage your money, pay your bills and track your expenses?: Yes  Make your own meals?: Yes    Do your own shopping?: Yes    Previous Hospitalizations:   Any hospitalizations or ED visits within the last 12 months?: No      Advance Care Planning:   Living will: Yes    Durable POA for healthcare: No    Advanced directive: Yes      Cognitive Screening:   Provider or family/friend/caregiver concerned regarding cognition?: No    PREVENTIVE SCREENINGS      Cardiovascular Screening:    General: Screening Not Indicated and History Lipid Disorder      Diabetes Screening:     General: Screening Current      Colorectal Cancer Screening:     General: Screening Current      Prostate Cancer Screening:    General: Screening Current      Osteoporosis Screening:    General: Screening Not Indicated      Abdominal Aortic Aneurysm (AAA) Screening:    Risk factors include: age between 70-77 yo and tobacco use        Lung Cancer Screening:     General: Screening Not Indicated      Hepatitis C Screening:    General: Screening Current    Screening, Brief Intervention, and Referral to Treatment (SBIRT)    Screening  Typical number of drinks in a day: 0  Typical number of drinks in a week: 0  Interpretation: Low risk drinking behavior. Single Item Drug Screening:  How often have you used an illegal drug (including marijuana) or a prescription medication for non-medical reasons in the past year? never    Single Item Drug Screen Score: 0  Interpretation: Negative screen for possible drug use disorder    Brief Intervention  Alcohol & drug use screenings were reviewed. No concerns regarding substance use disorder identified. Other Counseling Topics:   Regular weightbearing exercise. No results found.      Physical Exam:     /80 (BP Location: Right arm, Patient Position: Sitting, Cuff Size: Standard)   Pulse 61   Temp (!) 97.2 °F (36.2 °C) (Temporal)   Resp 16   Ht 6' 2" (1.88 m)   Wt 85.3 kg (188 lb)   SpO2 95%   BMI 24.14 kg/m²     Physical Exam  Vitals and nursing note reviewed. Constitutional:       General: He is not in acute distress. Appearance: Normal appearance. He is not ill-appearing. HENT:      Head: Normocephalic and atraumatic. Eyes:      General: No scleral icterus. Conjunctiva/sclera: Conjunctivae normal.   Neck:      Vascular: No carotid bruit. Cardiovascular:      Rate and Rhythm: Normal rate and regular rhythm. Heart sounds: Normal heart sounds. No murmur heard. Pulmonary:      Effort: Pulmonary effort is normal. No respiratory distress. Breath sounds: Normal breath sounds. Abdominal:      General: Bowel sounds are normal. There is no distension. Palpations: Abdomen is soft. Musculoskeletal:      Cervical back: Neck supple. No rigidity. Right lower leg: No edema. Left lower leg: No edema. Comments: Antalgic gait   Skin:     General: Skin is warm. Findings: No rash. Neurological:      General: No focal deficit present. Mental Status: He is alert and oriented to person, place, and time. Psychiatric:         Mood and Affect: Mood normal.         Behavior: Behavior normal.         Thought Content:  Thought content normal.          Deepika Cm MD

## 2023-09-14 NOTE — PATIENT INSTRUCTIONS
Medicare Preventive Visit Patient Instructions  Thank you for completing your Welcome to Medicare Visit or Medicare Annual Wellness Visit today. Your next wellness visit will be due in one year (9/14/2024). The screening/preventive services that you may require over the next 5-10 years are detailed below. Some tests may not apply to you based off risk factors and/or age. Screening tests ordered at today's visit but not completed yet may show as past due. Also, please note that scanned in results may not display below. Preventive Screenings:  Service Recommendations Previous Testing/Comments   Colorectal Cancer Screening  · Colonoscopy    · Fecal Occult Blood Test (FOBT)/Fecal Immunochemical Test (FIT)  · Fecal DNA/Cologuard Test  · Flexible Sigmoidoscopy Age: 43-73 years old   Colonoscopy: every 10 years (May be performed more frequently if at higher risk)  OR  FOBT/FIT: every 1 year  OR  Cologuard: every 3 years  OR  Sigmoidoscopy: every 5 years  Screening may be recommended earlier than age 39 if at higher risk for colorectal cancer. Also, an individualized decision between you and your healthcare provider will decide whether screening between the ages of 77-80 would be appropriate.  Colonoscopy: 02/17/2020  FOBT/FIT: 12/18/2021  Cologuard: 03/20/2023  Sigmoidoscopy: Not on file    Screening Current     Prostate Cancer Screening Individualized decision between patient and health care provider in men between ages of 53-66   Medicare will cover every 12 months beginning on the day after your 50th birthday PSA: 3.36 ng/mL     Screening Current     Hepatitis C Screening Once for adults born between 1945 and 1965  More frequently in patients at high risk for Hepatitis C Hep C Antibody: 11/14/2019    Screening Current   Diabetes Screening 1-2 times per year if you're at risk for diabetes or have pre-diabetes Fasting glucose: 77 mg/dL (3/13/2023)  A1C: 5.2 % (2/2/2022)  Screening Current   Cholesterol Screening Once every 5 years if you don't have a lipid disorder. May order more often based on risk factors. Lipid panel: 03/13/2023  Screening Not Indicated  History Lipid Disorder      Other Preventive Screenings Covered by Medicare:  1. Abdominal Aortic Aneurysm (AAA) Screening: covered once if your at risk. You're considered to be at risk if you have a family history of AAA or a male between the age of 70-76 who smoking at least 100 cigarettes in your lifetime. 2. Lung Cancer Screening: covers low dose CT scan once per year if you meet all of the following conditions: (1) Age 48-67; (2) No signs or symptoms of lung cancer; (3) Current smoker or have quit smoking within the last 15 years; (4) You have a tobacco smoking history of at least 20 pack years (packs per day x number of years you smoked); (5) You get a written order from a healthcare provider. 3. Glaucoma Screening: covered annually if you're considered high risk: (1) You have diabetes OR (2) Family history of glaucoma OR (3)  aged 48 and older OR (3)  American aged 72 and older  3. Osteoporosis Screening: covered every 2 years if you meet one of the following conditions: (1) Have a vertebral abnormality; (2) On glucocorticoid therapy for more than 3 months; (3) Have primary hyperparathyroidism; (4) On osteoporosis medications and need to assess response to drug therapy. 5. HIV Screening: covered annually if you're between the age of 14-79. Also covered annually if you are younger than 13 and older than 72 with risk factors for HIV infection. For pregnant patients, it is covered up to 3 times per pregnancy.     Immunizations:  Immunization Recommendations   Influenza Vaccine Annual influenza vaccination during flu season is recommended for all persons aged >= 6 months who do not have contraindications   Pneumococcal Vaccine   * Pneumococcal conjugate vaccine = PCV13 (Prevnar 13), PCV15 (Vaxneuvance), PCV20 (Prevnar 20)  * Pneumococcal polysaccharide vaccine = PPSV23 (Pneumovax) Adults 2364 years old: 1-3 doses may be recommended based on certain risk factors  Adults 72 years old: 1-2 doses may be recommended based off what pneumonia vaccine you previously received   Hepatitis B Vaccine 3 dose series if at intermediate or high risk (ex: diabetes, end stage renal disease, liver disease)   Tetanus (Td) Vaccine - COST NOT COVERED BY MEDICARE PART B Following completion of primary series, a booster dose should be given every 10 years to maintain immunity against tetanus. Td may also be given as tetanus wound prophylaxis. Tdap Vaccine - COST NOT COVERED BY MEDICARE PART B Recommended at least once for all adults. For pregnant patients, recommended with each pregnancy. Shingles Vaccine (Shingrix) - COST NOT COVERED BY MEDICARE PART B  2 shot series recommended in those aged 48 and above     Health Maintenance Due:      Topic Date Due   • Colorectal Cancer Screening  03/20/2026   • Hepatitis C Screening  Completed     Immunizations Due:      Topic Date Due   • COVID-19 Vaccine (4 - Moderna series) 12/30/2021   • Influenza Vaccine (1) 09/01/2023     Advance Directives   What are advance directives? Advance directives are legal documents that state your wishes and plans for medical care. These plans are made ahead of time in case you lose your ability to make decisions for yourself. Advance directives can apply to any medical decision, such as the treatments you want, and if you want to donate organs. What are the types of advance directives? There are many types of advance directives, and each state has rules about how to use them. You may choose a combination of any of the following:  · Living will: This is a written record of the treatment you want. You can also choose which treatments you do not want, which to limit, and which to stop at a certain time. This includes surgery, medicine, IV fluid, and tube feedings.    · Durable power of  for [facility]): This is a written record that states who you want to make healthcare choices for you when you are unable to make them for yourself. This person, called a proxy, is usually a family member or a friend. You may choose more than 1 proxy. · Do not resuscitate (DNR) order:  A DNR order is used in case your heart stops beating or you stop breathing. It is a request not to have certain forms of treatment, such as CPR. A DNR order may be included in other types of advance directives. · Medical directive: This covers the care that you want if you are in a coma, near death, or unable to make decisions for yourself. You can list the treatments you want for each condition. Treatment may include pain medicine, surgery, blood transfusions, dialysis, IV or tube feedings, and a ventilator (breathing machine). · Values history: This document has questions about your views, beliefs, and how you feel and think about life. This information can help others choose the care that you would choose. Why are advance directives important? An advance directive helps you control your care. Although spoken wishes may be used, it is better to have your wishes written down. Spoken wishes can be misunderstood, or not followed. Treatments may be given even if you do not want them. An advance directive may make it easier for your family to make difficult choices about your care. Fall Prevention    Fall prevention  includes ways to make your home and other areas safer. It also includes ways you can move more carefully to prevent a fall. Health conditions that cause changes in your blood pressure, vision, or muscle strength and coordination may increase your risk for falls. Medicines may also increase your risk for falls if they make you dizzy, weak, or sleepy. Fall prevention tips:   · Stand or sit up slowly. · Use assistive devices as directed. · Wear shoes that fit well and have soles that . · Wear a personal alarm. · Stay active. · Manage your medical conditions. Home Safety Tips:  · Add items to prevent falls in the bathroom. · Keep paths clear. · Install bright lights in your home. · Keep items you use often on shelves within reach. · Paint or place reflective tape on the edges of your stairs. © Copyright [employer] 2018 Information is for End User's use only and may not be sold, redistributed or otherwise used for commercial purposes.  All illustrations and images included in CareNotes® are the copyrighted property of A.D.A.M., Inc. or  [address]

## 2023-09-18 PROBLEM — R07.2 CHEST PAIN, PRECORDIAL: Status: RESOLVED | Noted: 2022-01-28 | Resolved: 2023-09-18

## 2023-09-18 NOTE — ASSESSMENT & PLAN NOTE
Asymptomatic, on [facility]'s neurology follows
Crestor daily, monitor labs
Patient follows with [facility]
Patient is due for CT chest in late December  Orders provided
Persistent low back pain with sciatica. Continue gabapentin  Previous imaging reveals multilevel disc disease/spondylosis.   Referral to [address] [place] [address]. [facility] orthopedic/spine surgery for further evaluation  Start Medrol Dosepak for symptom control now
Symptoms are chronic and overall stable  Patient declines Rx
yes

## 2023-09-22 ENCOUNTER — HOSPITAL ENCOUNTER (OUTPATIENT)
Dept: RADIOLOGY | Facility: HOSPITAL | Age: 68
Discharge: HOME | End: 2023-09-22
Attending: ORTHOPAEDIC SURGERY
Payer: COMMERCIAL

## 2023-09-22 ENCOUNTER — OFFICE VISIT (OUTPATIENT)
Dept: OBGYN CLINIC | Facility: HOSPITAL | Age: 68
End: 2023-09-22
Payer: COMMERCIAL

## 2023-09-22 VITALS
HEIGHT: 74 IN | SYSTOLIC BLOOD PRESSURE: 103 MMHG | BODY MASS INDEX: 24.17 KG/M2 | WEIGHT: 188.3 LBS | HEART RATE: 57 BPM | DIASTOLIC BLOOD PRESSURE: 72 MMHG

## 2023-09-22 DIAGNOSIS — M51.36 DDD (DEGENERATIVE DISC DISEASE), LUMBAR: Chronic | ICD-10-CM

## 2023-09-22 DIAGNOSIS — S39.012A ACUTE MYOFASCIAL STRAIN OF LUMBAR REGION, INITIAL ENCOUNTER: Primary | ICD-10-CM

## 2023-09-22 PROCEDURE — 72110 X-RAY EXAM L-2 SPINE 4/>VWS: CPT

## 2023-09-22 PROCEDURE — 99204 OFFICE O/P NEW MOD 45 MIN: CPT | Performed by: ORTHOPAEDIC SURGERY

## 2023-09-22 RX ORDER — TIZANIDINE 2 MG/1
2 TABLET ORAL
Qty: 14 TABLET | Refills: 0 | Status: SHIPPED | OUTPATIENT
Start: 2023-09-22

## 2023-09-22 NOTE — PROGRESS NOTES
Assessment & Plan/Medical Decision Makin y.o. male with Back Pain and imaging findings most notable for lumbar spondylosis  and L5-S1 disc degeneration         The clinical, physical and imaging findings were reviewed with the patient. Maycol Grant  has a constellation of findings consistent with Lumbar Myofascial Pain and Lumbar Facet/Arthrosis Pain in the setting of lumbar degenerative disease. Maycol Grant describes ongoing low back pain for about 10 years with worsening left > right sided low back pain about 2-3 weeks ago after twisting while trying to pull suitcase full of laundry over curb. Pain has improved since initial onset, about 3/10 currently. Fortunately patient remains neurologically intact and functional. Physical exam showing mild left paraspinal musculature tenderness. We discussed the treatment options including physical therapy, at home exercises, activity modifications, chiropractic medicine, oral medications, interventional spine procedures. At this time recommend continued conservative treatments. Continue to maintain at-home exercises at stretches. May use heat/ice as needed. Referral to chiropractic care for evaluation and treatment. Discussed potential role of chiropractic care in targeting current pain and symptoms. Zanaflex rx sent to patient's pharmacy. Patient may at bedtime as needed. Discussed reasoning for prescribing medication, proper usage, and potential side effects. Patient instructed to return to office/ER sooner if symptoms are not improving, getting worse, or new worrisome/neurologic symptoms arise. Patient will follow up in 1-2 months if pain does not improve with conservative treatment. Subjective:      Chief Complaint: Back Pain    HPI:  Leafy Buerger is a 79 y.o. male presenting for initial visit with chief complaint of ongoing low back and intermittent lower extremity pain for about 10 years.  Recently notes worsening left >right sided back pain about 2-3 weeks ago when he was pulling a suitcase full of laundry and twisted when trying to pull it up over a curb. Does report intermittent radiation of pain with numbness/tingling into posterior thighs, however lower extremity symptoms are intermittent and not as bothersome as back at this time. Back pain worse with prolonged sitting and standing, noting his pain and stiffness increases when he is stationary. Improved with movement, stretching. He is able to walk >1 mile daily without issue. Denies soha lower extremity weakness. He notes pain has improved since initial onset. Pain about 3/10 currently. He notes his most comfortable position is leaning forward, stretching. Denies any soha trauma. Denies fever or chills, no night sweats. Denies any bladder or bowel changes. PMH stroke about 10 years ago, has been out of work since. Bilateral hip replacements. Denies diabetes or kidney disease. Conservative therapy includes the following:   Medications: did take oral steroid with some relief  Injections: denies  Physical Therapy: denies  Chiropractic Medicine: has not attempted  Accupunture/Massage Therapy: has not attempted   These therapeutic modalities were ineffective at providing sustained pain relief/functional improvement.      Nicotine dependent: denies  Occupation: not currently working  Living situation: Lives alone  ADLs: patient is able to perform     Objective:     Family History   Problem Relation Age of Onset   • Anxiety disorder Mother         Symptom/disorder   • Hypertension Mother         Benign Essential   • Mitral valve prolapse Mother         Echo/Systolic   • PABLO disease Mother    • Fibromyalgia Mother    • Hyperlipidemia Mother    • Diabetes Father         Mellitus   • Cancer Brother    • Stroke Family         CVA   • Cancer Family    • Sudden death Family         Instantaneous Cardiac Death   • Other Family         Connective Tissue Defect       Past Medical History:   Diagnosis Date   • Aneurysm, ascending aorta (HCC)    • Anxiety disorder    • Arthritis     Last assessed: 11/17/16   • Asthma    • DDD (degenerative disc disease), cervical    • Depression    • Hyperlipidemia    • Hypertension    • Multiple fractures of ribs, left side, init for clos fx 11/15/2020   • Seizures (HCC)    • Stroke syndrome        Current Outpatient Medications   Medication Sig Dispense Refill   • acetaminophen (TYLENOL) 325 mg tablet Take 3 tablets (975 mg total) by mouth every 8 (eight) hours 1 Bottle 0   • aspirin 81 MG tablet Take 81 mg by mouth daily. • Calcium Carbonate-Vitamin D 600-200 MG-UNIT TABS Calcium + D3 600-200 MG-UNIT Oral Tablet    Refills: 0       Active     • CANNABIDIOL PO Take by mouth       • chlorhexidine (PERIDEX) 0.12 % solution RINSE WITH 1/2 FLUID OUNCE TWICE A DAY FOR 30 SECONDS AFTER MEALS FOR 21 DAYS     • Cholecalciferol (VITAMIN D3) 2000 units TABS Take 2,000 Units by mouth daily     • gabapentin (NEURONTIN) 600 MG tablet TAKE ONE TABLET BY MOUTH TWICE A DAY 60 tablet 5   • levETIRAcetam (KEPPRA) 500 mg tablet TAKE TWO TABLETS BY MOUTH EVERY 12 HOURS 360 tablet 3   • meloxicam (MOBIC) 15 mg tablet TAKE ONE TABLET BY MOUTH EVERY DAY AFTER FOOD AS NEEDED FOR LOW BACK PAIN 90 tablet 1   • Omega-3 Fatty Acids (FISH OIL) 1200 MG CAPS Take by mouth daily     • rosuvastatin (CRESTOR) 10 MG tablet TAKE ONE TABLET BY MOUTH EVERY DAY 90 tablet 3   • tiZANidine (ZANAFLEX) 2 mg tablet Take 1 tablet (2 mg total) by mouth daily at bedtime as needed for muscle spasms May make you drowsy. Do not drive until you know how it affects you. 14 tablet 0     No current facility-administered medications for this visit.        Past Surgical History:   Procedure Laterality Date   • ANKLE SURGERY     • COLONOSCOPY  2009    Due 2014   • HERNIA REPAIR     • ROOT CANAL     • TOTAL HIP ARTHROPLASTY Left 02/2014   • TOTAL HIP ARTHROPLASTY Right        Social History     Socioeconomic History   • Marital status:  Spouse name: Not on file   • Number of children: Not on file   • Years of education: Bachelor's Degree   • Highest education level: Not on file   Occupational History   • Not on file   Tobacco Use   • Smoking status: Former   • Smokeless tobacco: Never   Vaping Use   • Vaping Use: Former   • Quit date: 1/1/2011   Substance and Sexual Activity   • Alcohol use: Not Currently     Comment: Occasionally   • Drug use: Yes     Types: Marijuana     Comment: medical marijuana   • Sexual activity: Not on file   Other Topics Concern   • Not on file   Social History Narrative    Daily coffee consumption: 7 cups/day    Per [employer]: Currently      Social Determinants of Health     Financial Resource Strain: Low Risk  (9/14/2023)    Overall Financial Resource Strain (CARDIA)    • Difficulty of Paying Living Expenses: Not very hard   Food Insecurity: Not on file   Transportation Needs: No Transportation Needs (9/14/2023)    PRAPARE - Transportation    • Lack of Transportation (Medical): No    • Lack of Transportation (Non-Medical):  No   Physical Activity: Not on file   Stress: Not on file   Social Connections: Not on file   Intimate Partner Violence: Not on file   Housing Stability: Not on file       Allergies   Allergen Reactions   • Gluten Meal - Food Allergy Other (See Comments)     UNKNOWN       Review of Systems  General- denies fever/chills  HEENT- denies hearing loss or sore throat  Eyes- denies eye pain or visual disturbances, denies red eyes  Respiratory- denies cough or SOB  Cardio- denies chest pain or palpitations  GI- denies abdominal pain  Endocrine- denies urinary frequency  Urinary- denies pain with urination  Musculoskeletal- Negative except noted above  Skin- denies rashes or wounds  Neurological- denies dizziness or headache  Psychiatric- denies anxiety or difficulty concentrating    Physical Exam  /72   Pulse 57   Ht 6' 2" (1.88 m)   Wt 85.4 kg (188 lb 4.8 oz)   BMI 24.18 kg/m² General/Constitutional: No apparent distress: well-nourished and well developed. Lymphatic: No appreciable lymphadenopathy  Respiratory: Non-labored breathing  Vascular: No edema, swelling or tenderness, except as noted in detailed exam.  Integumentary: No impressive skin lesions present, except as noted in detailed exam.  Psych: Normal mood and affect, oriented to person, place and time. MSK: normal other than stated in HPI and exam  Gait & balance: no evidence of myelopathic gait, ambulates Independently     Lumbar spine range of motion:  -Forward flexion to 90  -Extension to neutral  -Lateral bend 25 right, 25 left  -Rotation 25 right, 25 left  There tenderness with palpation along lumbar paraspinal musculature, no midline tenderness     +TTP left lumbar paraspinal musculature    Neurologic:    Lower Extremity Motor Function    Right  Left    Iliopsoas  5/5  5/5    Quadriceps 5/5 5/5   Tibialis anterior  5/5  5/5    EHL  5/5  5/5    Gastroc. muscle  5/5  5/5    Heel rise  5/5  5/5    Toe rise  5/5  5/5      Sensory: light touch is intact to bilateral upper and lower extremities     Reflexes:    Right Left   Patellar 1+ 1+   Achilles 1+ 1+   Babinski neg neg     Other tests:  Straight Leg Raise: negative  Jones SI: negative  BRENTON SI: negative  Greater troch: no tenderness   Internal/external hip ROM: intact, no pain   Flexion/extension knee ROM: intact, no pain   Vascular: WWP extremities, 2+DP bilateral      Diagnostic Tests   IMAGING: I have personally reviewed the images and these are my findings:  Lumbar Spine X-rays from 9/22/2023: multi level lumbar spondylosis with loss of disc height notably L3-4 and L5-S1, osteophyte formation and facet hypertrophy, no apparent spondylolisthesis, no appreciated lytic/blastic lesions, no obvious instability    Procedures, if performed today     None performed       Portions of the record may have been created with voice recognition software.   Occasional wrong word or "sound a like" substitutions may have occurred due to the inherent limitations of voice recognition software. Read the chart carefully and recognize, using context, where substitutions have occurred.

## 2023-10-10 ENCOUNTER — CONSULT (OUTPATIENT)
Age: 68
End: 2023-10-10
Payer: COMMERCIAL

## 2023-10-10 VITALS
WEIGHT: 188 LBS | HEART RATE: 66 BPM | OXYGEN SATURATION: 97 % | SYSTOLIC BLOOD PRESSURE: 128 MMHG | HEIGHT: 74 IN | DIASTOLIC BLOOD PRESSURE: 84 MMHG | BODY MASS INDEX: 24.13 KG/M2

## 2023-10-10 DIAGNOSIS — M99.03 SEGMENTAL DYSFUNCTION OF LUMBAR REGION: ICD-10-CM

## 2023-10-10 DIAGNOSIS — S39.012A ACUTE MYOFASCIAL STRAIN OF LUMBAR REGION, INITIAL ENCOUNTER: ICD-10-CM

## 2023-10-10 DIAGNOSIS — M51.36 DDD (DEGENERATIVE DISC DISEASE), LUMBAR: Chronic | ICD-10-CM

## 2023-10-10 DIAGNOSIS — M99.04 SEGMENTAL DYSFUNCTION OF SACRAL REGION: Primary | ICD-10-CM

## 2023-10-10 PROCEDURE — 99203 OFFICE O/P NEW LOW 30 MIN: CPT | Performed by: CHIROPRACTOR

## 2023-10-10 PROCEDURE — 98941 CHIROPRACT MANJ 3-4 REGIONS: CPT | Performed by: CHIROPRACTOR

## 2023-10-10 NOTE — Clinical Note
Katiana Garcia did well with a focus on myofascial work and education on extension biased stretches/exercises to manage symptoms of DDD

## 2023-10-10 NOTE — PROGRESS NOTES
Initial date of service: 10/10/23    Diagnoses and all orders for this visit:    Segmental dysfunction of sacral region    DDD (degenerative disc disease), lumbar  -     Ambulatory referral to Chiropractic    Acute myofascial strain of lumbar region, initial encounter  -     Ambulatory referral to Chiropractic    Segmental dysfunction of lumbar region      Pt's symptoms, imaging and exam findings consistent with mechanical lbp secondary to repetitive st/sp injury of discogenic origin, exacerbated by postural stressors, compensation for extremity issues, psychosocial overlay, and core/glute deconditioning. Pt responded well to extension biased stretches and manual mobilization of the affected spinal and myofascial tissues with increased ROM; trial of conservative tx recommended consisting of Caitie extension biased exercises, graded mobilization/manipulation of the affected myofascial jt dysfunction, postural/ergonomic education and take home stretches/exercises. If symptoms fail to centralize or neurologic deficit presents/progresses, appropriate imaging and referral will be coordinated. Spent greater than 30 min c pt discussing hx, pe, ddx, tx options and reviewing intake notes/imaging    TREATMENT: 20561 AT  Fear avoidance behavior discussion; encouraged and reassured pt that natural course of condition is to improve over time with adherence to tx plan and home care strategies. Home care recommendations: walk (but avoid hills/trails), gradual return to activity to tolerance (avoid anything that peripheralizes symptoms), call if symptoms peripheralize, worsen, or neurologic deficit progresses.  Ther-ex: IASTM to affected mm hypertonicities (discussed soreness/ecchymosis up to 36 hrs post procedure); prone on elbows, prone push-ups at shoulders, standing lumbopelvic extension, hip flexor ischemic compression, alternating prone hip extension, glute bridge, transitional mvmt education, abdominal bracing; greater than 15 spent on above mentioned ther-ex designed to improve ROM/flexibility. Thoracic mobilization/manipulation: prone P-A mob, supine A-P manip; Lumbar mobilization/manipulation: extension-traction; SIJ Manipulation/Mobilization: L SIJ HVLA - long axis distraction    [facility]  Fahad George is a 79 y.o. male  Chief Complaint   Patient presents with   • Back Pain     Lower back pain-8   • Neck Pain     Neck pain-8     Pt presents for eval and tx for back and BLE pain onset 2010s    Back Pain  This is a chronic problem. The current episode started more than 1 year ago. The problem occurs constantly. The problem has been waxing and waning since onset. The pain is present in the lumbar spine, gluteal, thoracic spine and sacro-iliac. The quality of the pain is described as aching, burning, cramping, shooting and stabbing. The pain radiates to the right thigh and left thigh. Pain scale: 5-8/10. Worse during: worst in am. The symptoms are aggravated by sitting, standing, bending and twisting (palliative includes walking, Rlf antalgia, fetal position). Stiffness is present in the morning. Pertinent negatives include no bladder incontinence or bowel incontinence. Risk factors include poor posture, sedentary lifestyle and lack of exercise. He has tried NSAIDs, muscle relaxant and bed rest for the symptoms. The treatment provided mild relief.    Neck Pain       Past Medical History:   Diagnosis Date   • Aneurysm, ascending aorta ([address])    • Anxiety disorder    • Arthritis     Last assessed: 11/17/16   • Asthma    • DDD (degenerative disc disease), cervical    • Depression    • Hyperlipidemia    • Hypertension    • Multiple fractures of ribs, left side, init for clos fx 11/15/2020   • Seizures ([address])    • Stroke syndrome       Past Surgical History:   Procedure Laterality Date   • ANKLE SURGERY     • COLONOSCOPY  2009    Due 2014   • HERNIA REPAIR     • ROOT CANAL     • TOTAL HIP ARTHROPLASTY Left 02/2014   • TOTAL HIP ARTHROPLASTY Right      The following portions of the patient's history were reviewed and updated as appropriate: allergies, past family history, past medical history, past social history, past surgical history, and problem list.  Review of Systems   Gastrointestinal: Negative for bowel incontinence. Genitourinary: Negative for bladder incontinence. Musculoskeletal: Positive for back pain and neck pain. Physical Exam  Eyes:      Extraocular Movements: Extraocular movements intact. Cardiovascular:      Pulses: Normal pulses. Abdominal:      General: There is no distension. Tenderness: There is no abdominal tenderness. Musculoskeletal:      Thoracic back: Spasms and tenderness present. Decreased range of motion. Lumbar back: Spasms and tenderness present. Decreased range of motion. Negative right straight leg raise test and negative left straight leg raise test.        Back:       Comments: Pnful and limited in [place], [place] 50 degrees, palliative   Skin:     General: Skin is warm and dry. Neurological:      Mental Status: He is alert and oriented to person, place, and time. Cranial Nerves: Cranial nerves 2-12 are intact. Sensory: Sensation is intact. Motor: Motor function is intact. Coordination: Coordination is intact. Gait: Gait is intact. Deep Tendon Reflexes: Babinski sign absent on the right side. Babinski sign absent on the left side. Reflex Scores:       Patellar reflexes are 2+ on the right side and 2+ on the left side. Achilles reflexes are 2+ on the right side and 2+ on the left side.   Psychiatric:         Mood and Affect: Mood normal.         Behavior: Behavior normal.       SOFT TISSUE ASSESSMENT: Hypertonicity and tenderness palpated B T12-S1 erector spinae, hip flexor, glute med/min JOINT RESTRICTIONS: T12-S1 and L SIJ ORTHO: SI jt point tenderness: +; Caitie repeated flexion peripheralizes, extension centralizes; dino's, iliac compression, thigh thrust elicit stiffness in L SIJ; prone femoral nerve stretch neg for upper lumbar neural tension, elicits R/L SIJ stiffness; sitting root elicits no lbp on R/L; slump test elicits neural tension into RLE/LLE    Return in about 1 week (around 10/17/2023) for Next scheduled follow up.

## 2023-10-19 ENCOUNTER — PROCEDURE VISIT (OUTPATIENT)
Age: 68
End: 2023-10-19
Payer: COMMERCIAL

## 2023-10-19 VITALS
HEART RATE: 63 BPM | HEIGHT: 74 IN | BODY MASS INDEX: 24.13 KG/M2 | WEIGHT: 188 LBS | DIASTOLIC BLOOD PRESSURE: 70 MMHG | OXYGEN SATURATION: 98 % | SYSTOLIC BLOOD PRESSURE: 116 MMHG

## 2023-10-19 DIAGNOSIS — M51.36 DDD (DEGENERATIVE DISC DISEASE), LUMBAR: Primary | ICD-10-CM

## 2023-10-19 DIAGNOSIS — S39.012A ACUTE MYOFASCIAL STRAIN OF LUMBAR REGION, INITIAL ENCOUNTER: ICD-10-CM

## 2023-10-19 DIAGNOSIS — M99.03 SEGMENTAL DYSFUNCTION OF LUMBAR REGION: ICD-10-CM

## 2023-10-19 DIAGNOSIS — M99.04 SEGMENTAL DYSFUNCTION OF SACRAL REGION: ICD-10-CM

## 2023-10-19 PROCEDURE — 98941 CHIROPRACT MANJ 3-4 REGIONS: CPT | Performed by: CHIROPRACTOR

## 2023-10-19 NOTE — PROGRESS NOTES
Initial date of service: 10/10/23    Diagnoses and all orders for this visit:    DDD (degenerative disc disease), lumbar    Acute myofascial strain of lumbar region, initial encounter    Segmental dysfunction of sacral region    Segmental dysfunction of lumbar region    Pt improved with reduced pain and increased ROM    TREATMENT: 12617 AT  Fear avoidance behavior discussion; encouraged and reassured pt that natural course of condition is to improve over time with adherence to tx plan and home care strategies. Home care recommendations: walk (but avoid hills/trails), gradual return to activity to tolerance (avoid anything that peripheralizes symptoms), call if symptoms peripheralize, worsen, or neurologic deficit progresses. Ther-ex: IASTM to affected mm hypertonicities (discussed soreness/ecchymosis up to 36 hrs post procedure); prone on elbows, prone push-ups at shoulders, standing lumbopelvic extension, hip flexor ischemic compression, alternating prone hip extension, glute bridge, transitional mvmt education, abdominal bracing; Thoracic mobilization/manipulation: prone P-A mob, supine A-P manip; Lumbar mobilization/manipulation: extension-traction; SIJ Manipulation/Mobilization: L SIJ HVLA - long axis distraction    HPI  Yesi Egan is a 79 y.o. male  Chief Complaint   Patient presents with    Back Pain     Lower back pain-5    Neck Pain     Neck pain-5     Pt presents for tx for back and back and BLE pain onset 2010s.  10/19: Pt reports feeling and moving slightly better after last tx    Back Pain  This is a chronic problem. The current episode started more than 1 year ago. The problem occurs constantly. The problem has been waxing and waning since onset. The pain is present in the lumbar spine, gluteal, thoracic spine and sacro-iliac. The quality of the pain is described as aching, burning, cramping, shooting and stabbing. The pain radiates to the right thigh and left thigh. Pain scale: 3-7/10.  Worse during: worst in am. The symptoms are aggravated by sitting, standing, bending and twisting (palliative includes walking, Rlf antalgia, fetal position). Stiffness is present In the morning. Pertinent negatives include no bladder incontinence or bowel incontinence. Risk factors include poor posture, sedentary lifestyle and lack of exercise. He has tried NSAIDs, muscle relaxant and bed rest for the symptoms. The treatment provided mild relief. Neck Pain       Past Medical History:   Diagnosis Date    Aneurysm, ascending aorta ([address])     Anxiety disorder     Arthritis     Last assessed: 11/17/16    Asthma     DDD (degenerative disc disease), cervical     Depression     Hyperlipidemia     Hypertension     Multiple fractures of ribs, left side, init for clos fx 11/15/2020    Seizures ([address])     Stroke syndrome       Past Surgical History:   Procedure Laterality Date    ANKLE SURGERY      COLONOSCOPY  2009    Due 2014    HERNIA REPAIR      ROOT CANAL      TOTAL HIP ARTHROPLASTY Left 02/2014    TOTAL HIP ARTHROPLASTY Right      The following portions of the patient's history were reviewed and updated as appropriate: allergies, past family history, past medical history, past social history, past surgical history, and problem list.  Review of Systems   Gastrointestinal:  Negative for bowel incontinence. Genitourinary:  Negative for bladder incontinence. Musculoskeletal:  Positive for back pain and neck pain. Physical Exam  Musculoskeletal:      Thoracic back: Spasms and tenderness present. Decreased range of motion. Lumbar back: Spasms and tenderness present. Decreased range of motion. Negative right straight leg raise test and negative left straight leg raise test.        Back:       Comments: Pnful and limited in [place], [place] 50 degrees, palliative   Skin:     General: Skin is warm and dry. Neurological:      Mental Status: He is alert and oriented to person, place, and time. Coordination: Coordination is intact. Gait: Gait is intact. Psychiatric:         Mood and Affect: Mood normal.         Behavior: Behavior normal.       SOFT TISSUE ASSESSMENT: Hypertonicity and tenderness palpated B T12-S1 erector spinae, hip flexor, glute med/min JOINT RESTRICTIONS: T12-S1 and L SIJ     Return in about 1 week (around 10/26/2023) for Next scheduled follow up.

## 2023-10-26 ENCOUNTER — PROCEDURE VISIT (OUTPATIENT)
Age: 68
End: 2023-10-26
Payer: COMMERCIAL

## 2023-10-26 VITALS
HEIGHT: 74 IN | HEART RATE: 68 BPM | OXYGEN SATURATION: 97 % | WEIGHT: 188 LBS | BODY MASS INDEX: 24.13 KG/M2 | SYSTOLIC BLOOD PRESSURE: 114 MMHG | DIASTOLIC BLOOD PRESSURE: 70 MMHG

## 2023-10-26 DIAGNOSIS — S39.012A ACUTE MYOFASCIAL STRAIN OF LUMBAR REGION, INITIAL ENCOUNTER: ICD-10-CM

## 2023-10-26 DIAGNOSIS — M99.03 SEGMENTAL DYSFUNCTION OF LUMBAR REGION: ICD-10-CM

## 2023-10-26 DIAGNOSIS — M99.04 SEGMENTAL DYSFUNCTION OF SACRAL REGION: ICD-10-CM

## 2023-10-26 DIAGNOSIS — M51.36 DDD (DEGENERATIVE DISC DISEASE), LUMBAR: Primary | ICD-10-CM

## 2023-10-26 PROCEDURE — 98941 CHIROPRACT MANJ 3-4 REGIONS: CPT | Performed by: CHIROPRACTOR

## 2023-10-26 NOTE — PROGRESS NOTES
Initial date of service: 10/10/23    Diagnoses and all orders for this visit:    DDD (degenerative disc disease), lumbar    Acute myofascial strain of lumbar region, initial encounter    Segmental dysfunction of sacral region    Segmental dysfunction of lumbar region    Pt improved with reduced pain and increased ROM    TREATMENT: 30978 AT  Fear avoidance behavior discussion; encouraged and reassured pt that natural course of condition is to improve over time with adherence to tx plan and home care strategies. Home care recommendations: walk (but avoid hills/trails), gradual return to activity to tolerance (avoid anything that peripheralizes symptoms), call if symptoms peripheralize, worsen, or neurologic deficit progresses. Ther-ex: IASTM to affected mm hypertonicities (discussed soreness/ecchymosis up to 36 hrs post procedure); prone on elbows, prone push-ups at shoulders, standing lumbopelvic extension, hip flexor ischemic compression, alternating prone hip extension, glute bridge, transitional mvmt education, abdominal bracing; Thoracic mobilization/manipulation: prone P-A mob, supine A-P manip; Lumbar mobilization/manipulation: extension-traction; SIJ Manipulation/Mobilization: L SIJ HVLA - long axis distraction    HPI  Alli Bird is a 79 y.o. male  Chief Complaint   Patient presents with    Back Pain     Lower back pain-3    Neck Pain     Neck pain-3     Pt presents for tx for back and back and BLE pain onset 2010s.  10/26: Pt reports feeling and moving slightly better after last tx    Back Pain  This is a chronic problem. The current episode started more than 1 year ago. The problem occurs constantly. The problem has been waxing and waning since onset. The pain is present in the lumbar spine, gluteal, thoracic spine and sacro-iliac. The quality of the pain is described as aching, burning, cramping, shooting and stabbing. The pain radiates to the right thigh and left thigh. Pain scale: 1-5/10.  Worse during: worst in am. The symptoms are aggravated by sitting, standing, bending and twisting (palliative includes walking, Rlf antalgia, fetal position). Stiffness is present In the morning. Pertinent negatives include no bladder incontinence or bowel incontinence. Risk factors include poor posture, sedentary lifestyle and lack of exercise. He has tried NSAIDs, muscle relaxant and bed rest for the symptoms. The treatment provided mild relief. Neck Pain       Past Medical History:   Diagnosis Date    Aneurysm, ascending aorta ([address])     Anxiety disorder     Arthritis     Last assessed: 11/17/16    Asthma     DDD (degenerative disc disease), cervical     Depression     Hyperlipidemia     Hypertension     Multiple fractures of ribs, left side, init for clos fx 11/15/2020    Seizures ([address])     Stroke syndrome       Past Surgical History:   Procedure Laterality Date    ANKLE SURGERY      COLONOSCOPY  2009    Due 2014    HERNIA REPAIR      ROOT CANAL      TOTAL HIP ARTHROPLASTY Left 02/2014    TOTAL HIP ARTHROPLASTY Right      The following portions of the patient's history were reviewed and updated as appropriate: allergies, past family history, past medical history, past social history, past surgical history, and problem list.  Review of Systems   Gastrointestinal:  Negative for bowel incontinence. Genitourinary:  Negative for bladder incontinence. Musculoskeletal:  Positive for back pain and neck pain. Physical Exam  Musculoskeletal:      Thoracic back: Spasms and tenderness present. Decreased range of motion. Lumbar back: Spasms and tenderness present. Decreased range of motion. Negative right straight leg raise test and negative left straight leg raise test.        Back:       Comments: Pnful and limited in [place], [place] 50 degrees, palliative   Skin:     General: Skin is warm and dry. Neurological:      Mental Status: He is alert and oriented to person, place, and time. Coordination: Coordination is intact. Gait: Gait is intact. Psychiatric:         Mood and Affect: Mood normal.         Behavior: Behavior normal.       SOFT TISSUE ASSESSMENT: Hypertonicity and tenderness palpated B T12-S1 erector spinae, hip flexor, glute med/min JOINT RESTRICTIONS: T12-S1 and L SIJ     Return in about 1 week (around 11/2/2023) for Next scheduled follow up.

## 2023-11-02 ENCOUNTER — PROCEDURE VISIT (OUTPATIENT)
Age: 68
End: 2023-11-02
Payer: COMMERCIAL

## 2023-11-02 VITALS
HEART RATE: 61 BPM | OXYGEN SATURATION: 98 % | SYSTOLIC BLOOD PRESSURE: 110 MMHG | DIASTOLIC BLOOD PRESSURE: 72 MMHG | HEIGHT: 74 IN | BODY MASS INDEX: 24.13 KG/M2 | WEIGHT: 188 LBS

## 2023-11-02 DIAGNOSIS — M99.03 SEGMENTAL DYSFUNCTION OF LUMBAR REGION: ICD-10-CM

## 2023-11-02 DIAGNOSIS — M51.36 DDD (DEGENERATIVE DISC DISEASE), LUMBAR: Primary | ICD-10-CM

## 2023-11-02 DIAGNOSIS — M99.04 SEGMENTAL DYSFUNCTION OF SACRAL REGION: ICD-10-CM

## 2023-11-02 DIAGNOSIS — S39.012A ACUTE MYOFASCIAL STRAIN OF LUMBAR REGION, INITIAL ENCOUNTER: ICD-10-CM

## 2023-11-02 PROCEDURE — 98941 CHIROPRACT MANJ 3-4 REGIONS: CPT | Performed by: CHIROPRACTOR

## 2023-11-02 NOTE — PROGRESS NOTES
Initial date of service: 10/10/23    Diagnoses and all orders for this visit:    DDD (degenerative disc disease), lumbar    Acute myofascial strain of lumbar region, initial encounter    Segmental dysfunction of sacral region    Segmental dysfunction of lumbar region    Pt much improved with reduced pain and increased ROM. Pt able to walk further without difficulty. Pt encouraged to maintain home exercise program and f/up prn    TREATMENT: 05457 AT  Fear avoidance behavior discussion; encouraged and reassured pt that natural course of condition is to improve over time with adherence to tx plan and home care strategies. Home care recommendations: walk (but avoid hills/trails), gradual return to activity to tolerance (avoid anything that peripheralizes symptoms), call if symptoms peripheralize, worsen, or neurologic deficit progresses. Ther-ex: IASTM to affected mm hypertonicities (discussed soreness/ecchymosis up to 36 hrs post procedure); prone on elbows, prone push-ups at shoulders, standing lumbopelvic extension, hip flexor ischemic compression, alternating prone hip extension, glute bridge, transitional mvmt education, abdominal bracing; Thoracic mobilization/manipulation: prone P-A mob, supine A-P manip; Lumbar mobilization/manipulation: extension-traction; SIJ Manipulation/Mobilization: L SIJ HVLA - long axis distraction    HPI  Elizabet Acuña is a 79 y.o. male  Chief Complaint   Patient presents with    Back Pain     Lower back pain-2    Neck Pain     Neck pain-2     Pt presents for tx for back and back and BLE pain onset 2010s.  11/2: Pt reports feeling and moving better after last tx; walking more; keeping up home program and wants to incorporate light weights    Back Pain  This is a chronic problem. The current episode started more than 1 year ago. The problem occurs constantly. The problem has been waxing and waning since onset. The pain is present in the lumbar spine, gluteal, thoracic spine and sacro-iliac. The quality of the pain is described as aching, burning, cramping, shooting and stabbing. The pain radiates to the right thigh and left thigh. Pain scale: 1-2/10. Worse during: worst in am. The symptoms are aggravated by sitting, standing, bending and twisting (palliative includes walking, Rlf antalgia, fetal position). Stiffness is present In the morning. Pertinent negatives include no bladder incontinence or bowel incontinence. Risk factors include poor posture, sedentary lifestyle and lack of exercise. He has tried NSAIDs, muscle relaxant and bed rest for the symptoms. The treatment provided mild relief. Neck Pain       Past Medical History:   Diagnosis Date    Aneurysm, ascending aorta ([address])     Anxiety disorder     Arthritis     Last assessed: 11/17/16    Asthma     DDD (degenerative disc disease), cervical     Depression     Hyperlipidemia     Hypertension     Multiple fractures of ribs, left side, init for clos fx 11/15/2020    Seizures ([address])     Stroke syndrome       Past Surgical History:   Procedure Laterality Date    ANKLE SURGERY      COLONOSCOPY  2009    Due 2014    HERNIA REPAIR      ROOT CANAL      TOTAL HIP ARTHROPLASTY Left 02/2014    TOTAL HIP ARTHROPLASTY Right      The following portions of the patient's history were reviewed and updated as appropriate: allergies, past family history, past medical history, past social history, past surgical history, and problem list.  Review of Systems   Gastrointestinal:  Negative for bowel incontinence. Genitourinary:  Negative for bladder incontinence. Musculoskeletal:  Positive for back pain and neck pain. Physical Exam  Musculoskeletal:      Thoracic back: Spasms and tenderness present. Decreased range of motion. Lumbar back: Spasms and tenderness present. Decreased range of motion.  Negative right straight leg raise test and negative left straight leg raise test.        Back:       Comments: Pnful and limited in [place], [place] 50 degrees, palliative Skin:     General: Skin is warm and dry. Neurological:      Mental Status: He is alert and oriented to person, place, and time. Coordination: Coordination is intact. Gait: Gait is intact. Psychiatric:         Mood and Affect: Mood normal.         Behavior: Behavior normal.       SOFT TISSUE ASSESSMENT: Hypertonicity and tenderness palpated B T12-S1 erector spinae, hip flexor, glute med/min JOINT RESTRICTIONS: T12-S1 and L SIJ     Return if symptoms worsen or fail to improve.

## 2023-11-20 ENCOUNTER — TRANSCRIBE ORDERS (OUTPATIENT)
Dept: LAB | Facility: CLINIC | Age: 68
End: 2023-11-20

## 2023-11-20 ENCOUNTER — APPOINTMENT (OUTPATIENT)
Dept: LAB | Facility: CLINIC | Age: 68
End: 2023-11-20
Payer: COMMERCIAL

## 2023-11-20 ENCOUNTER — TELEPHONE (OUTPATIENT)
Age: 68
End: 2023-11-20

## 2023-11-20 DIAGNOSIS — I10 PRIMARY HYPERTENSION: ICD-10-CM

## 2023-11-20 DIAGNOSIS — R97.20 ELEVATED PROSTATE SPECIFIC ANTIGEN (PSA): Primary | ICD-10-CM

## 2023-11-20 DIAGNOSIS — R97.20 ELEVATED PROSTATE SPECIFIC ANTIGEN (PSA): ICD-10-CM

## 2023-11-20 DIAGNOSIS — E78.2 MIXED HYPERLIPIDEMIA: Chronic | ICD-10-CM

## 2023-11-20 DIAGNOSIS — R97.20 INCREASED PROSTATE SPECIFIC ANTIGEN (PSA) VELOCITY: ICD-10-CM

## 2023-11-20 LAB
ALBUMIN SERPL BCP-MCNC: 4.2 G/DL (ref 3.5–5)
ALP SERPL-CCNC: 61 U/L (ref 34–104)
ALT SERPL W P-5'-P-CCNC: 14 U/L (ref 7–52)
ANION GAP SERPL CALCULATED.3IONS-SCNC: 5 MMOL/L
AST SERPL W P-5'-P-CCNC: 21 U/L (ref 13–39)
BILIRUB SERPL-MCNC: 0.41 MG/DL (ref 0.2–1)
BUN SERPL-MCNC: 19 MG/DL (ref 5–25)
CALCIUM SERPL-MCNC: 9.2 MG/DL (ref 8.4–10.2)
CHLORIDE SERPL-SCNC: 105 MMOL/L (ref 96–108)
CO2 SERPL-SCNC: 31 MMOL/L (ref 21–32)
CREAT SERPL-MCNC: 0.77 MG/DL (ref 0.6–1.3)
ERYTHROCYTE [DISTWIDTH] IN BLOOD BY AUTOMATED COUNT: 12.7 % (ref 11.6–15.1)
GFR SERPL CREATININE-BSD FRML MDRD: 94 ML/MIN/1.73SQ M
GLUCOSE P FAST SERPL-MCNC: 93 MG/DL (ref 65–99)
HCT VFR BLD AUTO: 42.6 % (ref 36.5–49.3)
HGB BLD-MCNC: 13.6 G/DL (ref 12–17)
MCH RBC QN AUTO: 30.5 PG (ref 26.8–34.3)
MCHC RBC AUTO-ENTMCNC: 31.9 G/DL (ref 31.4–37.4)
MCV RBC AUTO: 96 FL (ref 82–98)
PLATELET # BLD AUTO: 223 THOUSANDS/UL (ref 149–390)
PMV BLD AUTO: 11.1 FL (ref 8.9–12.7)
POTASSIUM SERPL-SCNC: 4.8 MMOL/L (ref 3.5–5.3)
PROT SERPL-MCNC: 6.6 G/DL (ref 6.4–8.4)
PSA SERPL-MCNC: 2.53 NG/ML (ref 0–4)
RBC # BLD AUTO: 4.46 MILLION/UL (ref 3.88–5.62)
SODIUM SERPL-SCNC: 141 MMOL/L (ref 135–147)
WBC # BLD AUTO: 5.34 THOUSAND/UL (ref 4.31–10.16)

## 2023-11-20 PROCEDURE — 80053 COMPREHEN METABOLIC PANEL: CPT

## 2023-11-20 PROCEDURE — G0103 PSA SCREENING: HCPCS

## 2023-11-20 PROCEDURE — 85027 COMPLETE CBC AUTOMATED: CPT

## 2023-11-20 PROCEDURE — 36415 COLL VENOUS BLD VENIPUNCTURE: CPT

## 2023-11-20 NOTE — TELEPHONE ENCOUNTER
Patient called to request that his PSA lab be sent to [facility]. I faxed it to the number the patient provided.

## 2023-11-29 ENCOUNTER — OFFICE VISIT (OUTPATIENT)
Dept: UROLOGY | Facility: AMBULATORY SURGERY CENTER | Age: 68
End: 2023-11-29
Payer: COMMERCIAL

## 2023-11-29 VITALS
WEIGHT: 185 LBS | BODY MASS INDEX: 23.74 KG/M2 | HEART RATE: 81 BPM | HEIGHT: 74 IN | DIASTOLIC BLOOD PRESSURE: 92 MMHG | SYSTOLIC BLOOD PRESSURE: 140 MMHG | OXYGEN SATURATION: 97 % | RESPIRATION RATE: 18 BRPM

## 2023-11-29 DIAGNOSIS — R97.20 INCREASED PROSTATE SPECIFIC ANTIGEN (PSA) VELOCITY: Primary | ICD-10-CM

## 2023-11-29 PROCEDURE — 99213 OFFICE O/P EST LOW 20 MIN: CPT

## 2023-11-29 NOTE — PROGRESS NOTES
Office Visit- Urology  Zoë Garza 1955 MRN: [id number]      Assessment/Discussion/Plan    79 y.o. male managed by     Prostate cancer screening/elevated PSA velocity  -Patient with history of increased PSA velocity. In March 2023 his PSA increased to 4.0 from baseline 2.0  -Patient was unable to tolerate prostate exam at time of consultation with Dr. Sterling Egan in April 2023 and he continues to refuse a proximal exam at this point in time  -Thankfully his PSA has decreased. Last measurement was 2.53 on 11/20/2023. PSA has been fluctuating with a measurement of 3.36 in June 2023 and 2.4 in May 2023  -Discussed at this point with a PSA near his baseline I think it is safe to observe. He can have a repeat PSA in 6 months. If there is a significant elevation we will plan to obtain an MRI of the prostate and follow-up in the office to discuss. If PSA is stable he will be seen in November 2024 in person with a PSA prior      Chief Complaint:   Litzy Almaraz is a 79 y.o. male presenting to the office for a follow up visit regarding prostate cancer screening/elevated PSA velocity        Subjective    Patient is a 16-year-old male who returns for follow-up for prostate cancer screening/elevated PSA velocity. He was initially seen in consultation by Dr. Sterling Egan in April 2023. At that point in time patient had a rise in PSA to 4.0 measured in March 2023 from a baseline of 2 last checked in 2021. DYLLAN was attempted but patient was unable to tolerate so exam was not performed. Is been no significant family history of prostate cancer. Patient elected to recheck PSA and it returned at 2.4 in May 2023 patient was advised that he can follow-up in 6 months with a PSA at that point in time. He presents for follow-up today. He has had 2 PSAs since check in May 2023 with 1 measurement of 3.34 most recently 2.53 in November 2023. He denies any significant bothersome urinary symptoms. No blood in the urine or dysuria.         ROS: Review of Systems   Constitutional: Negative. Negative for chills, fatigue and fever. HENT: Negative. Respiratory:  Negative for shortness of breath. Cardiovascular:  Negative for chest pain. Gastrointestinal: Negative. Negative for abdominal pain. Endocrine: Negative. Musculoskeletal: Negative. Skin: Negative. Neurological: Negative. Negative for dizziness and light-headedness. Hematological: Negative. Psychiatric/Behavioral: Negative.            Past Medical History  Past Medical History:   Diagnosis Date    Aneurysm, ascending aorta (HCC)     Anxiety disorder     Arthritis     Last assessed: 11/17/16    Asthma     DDD (degenerative disc disease), cervical     Depression     Hyperlipidemia     Hypertension     Multiple fractures of ribs, left side, init for clos fx 11/15/2020    Seizures ([address])     Stroke syndrome        Past Surgical History  Past Surgical History:   Procedure Laterality Date    ANKLE SURGERY      COLONOSCOPY  2009    Due 2014    HERNIA REPAIR      ROOT CANAL      TOTAL HIP ARTHROPLASTY Left 02/2014    TOTAL HIP ARTHROPLASTY Right        Past Family History  Family History   Problem Relation Age of Onset    Anxiety disorder Mother         Symptom/disorder    Hypertension Mother         Benign Essential    Mitral valve prolapse Mother         Echo/Systolic    PABLO disease Mother     Fibromyalgia Mother     Hyperlipidemia Mother     Diabetes Father         Mellitus    Cancer Brother     Stroke Family         CVA    Cancer Family     Sudden death Family         Instantaneous Cardiac Death    Other Family         Connective Tissue Defect       Past Social history  Social History     Socioeconomic History    Marital status:      Spouse name: Not on file    Number of children: Not on file    Years of education: Bachelor's Degree    Highest education level: Not on file   Occupational History    Not on file   Tobacco Use    Smoking status: Former    Smokeless tobacco: Never   Vaping Use    Vaping Use: Former    Quit date: 1/1/2011   Substance and Sexual Activity    Alcohol use: Not Currently     Comment: Occasionally    Drug use: Yes     Types: Marijuana     Comment: medical marijuana    Sexual activity: Not on file   Other Topics Concern    Not on file   Social History Narrative    Daily coffee consumption: 7 cups/day    Per [employer]: Currently      Social Determinants of Health     Financial Resource Strain: Low Risk  (9/14/2023)    Overall Financial Resource Strain (CARDIA)     Difficulty of Paying Living Expenses: Not very hard   Food Insecurity: Not on file   Transportation Needs: No Transportation Needs (9/14/2023)    PRAPARE - Transportation     Lack of Transportation (Medical): No     Lack of Transportation (Non-Medical): No   Physical Activity: Not on file   Stress: Not on file   Social Connections: Not on file   Intimate Partner Violence: Not on file   Housing Stability: Not on file       Current Medications  Current Outpatient Medications   Medication Sig Dispense Refill    acetaminophen (TYLENOL) 325 mg tablet Take 3 tablets (975 mg total) by mouth every 8 (eight) hours 1 Bottle 0    aspirin 81 MG tablet Take 81 mg by mouth daily. Cholecalciferol (VITAMIN D3) 2000 units TABS Take 2,000 Units by mouth daily      gabapentin (NEURONTIN) 600 MG tablet TAKE ONE TABLET BY MOUTH TWICE A DAY 60 tablet 5    meloxicam (MOBIC) 15 mg tablet TAKE ONE TABLET BY MOUTH EVERY DAY AFTER FOOD AS NEEDED FOR LOW BACK PAIN 90 tablet 1    rosuvastatin (CRESTOR) 10 MG tablet TAKE ONE TABLET BY MOUTH EVERY DAY 90 tablet 3    tiZANidine (ZANAFLEX) 2 mg tablet Take 1 tablet (2 mg total) by mouth daily at bedtime as needed for muscle spasms May make you drowsy. Do not drive until you know how it affects you.  14 tablet 0    Calcium Carbonate-Vitamin D 600-200 MG-UNIT TABS Calcium + D3 600-200 MG-UNIT Oral Tablet    Refills: 0       Active (Patient not taking: Reported on 11/29/2023) CANNABIDIOL PO Take by mouth   (Patient not taking: Reported on 10/10/2023)      chlorhexidine (PERIDEX) 0.12 % solution RINSE WITH 1/2 FLUID OUNCE TWICE A DAY FOR 30 SECONDS AFTER MEALS FOR 21 DAYS (Patient not taking: Reported on 10/10/2023)      levETIRAcetam (KEPPRA) 500 mg tablet TAKE TWO TABLETS BY MOUTH EVERY 12 HOURS (Patient not taking: Reported on 11/29/2023) 360 tablet 3    Omega-3 Fatty Acids (FISH OIL) 1200 MG CAPS Take by mouth daily (Patient not taking: Reported on 10/10/2023)       No current facility-administered medications for this visit. Allergies  Allergies   Allergen Reactions    Gluten Meal - Food Allergy Other (See Comments)     UNKNOWN       OBJECTIVE    Vitals   Vitals:    11/29/23 1319   BP: 140/92   BP Location: Left arm   Patient Position: Sitting   Cuff Size: Adult   Pulse: 81   Resp: 18   SpO2: 97%   Weight: 83.9 kg (185 lb)   Height: 6' 2" (1.88 m)       PVR:    Physical Exam  Constitutional:       General: He is not in acute distress. Appearance: Normal appearance. He is normal weight. He is not ill-appearing or toxic-appearing. HENT:      Head: Normocephalic and atraumatic. Eyes:      Conjunctiva/sclera: Conjunctivae normal.   Cardiovascular:      Rate and Rhythm: Normal rate. Pulmonary:      Effort: Pulmonary effort is normal. No respiratory distress. Genitourinary:     Comments: Refused by patient  Skin:     General: Skin is warm and dry. Neurological:      General: No focal deficit present. Mental Status: He is alert and oriented to person, place, and time. Cranial Nerves: No cranial nerve deficit. Psychiatric:         Mood and Affect: Mood normal.         Behavior: Behavior normal.         Thought Content:  Thought content normal.          Labs:     Lab Results   Component Value Date    PSA 2.53 11/20/2023    PSA 3.36 06/15/2023    PSA 2.4 05/02/2023    PSA 4.0 03/13/2023     Lab Results   Component Value Date    CREATININE 0.77 11/20/2023 Lab Results   Component Value Date    HGBA1C 5.2 02/02/2022     Lab Results   Component Value Date    GLUCOSE 83 12/18/2015    CALCIUM 9.2 11/20/2023     12/18/2015    K 4.8 11/20/2023    CO2 31 11/20/2023     11/20/2023    BUN 19 11/20/2023    CREATININE 0.77 11/20/2023       I have personally reviewed all pertinent lab results and reviewed with patient    Imaging       Aguila Awad PA-C  Date: 11/29/2023 Time: 1:33 PM  [facility]    This note was written using fluency dictation software. Please excuse any resulting minor grammatical errors.

## 2023-12-03 DIAGNOSIS — G40.209 COMPLEX PARTIAL SEIZURE EVOLVING TO GENERALIZED SEIZURE (HCC): ICD-10-CM

## 2023-12-03 DIAGNOSIS — M51.36 DDD (DEGENERATIVE DISC DISEASE), LUMBAR: ICD-10-CM

## 2023-12-03 RX ORDER — MELOXICAM 15 MG/1
TABLET ORAL
Qty: 90 TABLET | Refills: 1 | Status: SHIPPED | OUTPATIENT
Start: 2023-12-03

## 2023-12-04 RX ORDER — LEVETIRACETAM 500 MG/1
TABLET ORAL
Qty: 360 TABLET | Refills: 3 | Status: SHIPPED | OUTPATIENT
Start: 2023-12-04

## 2023-12-11 DIAGNOSIS — M51.36 DDD (DEGENERATIVE DISC DISEASE), LUMBAR: ICD-10-CM

## 2023-12-11 RX ORDER — GABAPENTIN 600 MG/1
TABLET ORAL
Qty: 60 TABLET | Refills: 5 | Status: SHIPPED | OUTPATIENT
Start: 2023-12-11

## 2023-12-14 ENCOUNTER — TELEPHONE (OUTPATIENT)
Dept: NEUROLOGY | Facility: CLINIC | Age: 68
End: 2023-12-14

## 2023-12-14 NOTE — TELEPHONE ENCOUNTER
Recd  12/13 12:43 PM    my name is Glenis Wright, [birth year]. I am calling in regards to I just had an appointment. I think it was last week I had to cancel because I had to take my friend into the hospital and I could not get an appointment until march 6 and now I am out of meds. And I wonder if the doctor could described me some new meds down at the [facility] pharmacy in [facility]. My number is [phone].   __________  prescription for levetiracetam was sent to [facility] on 12/4. Patient did not specify med on vm.

## 2023-12-27 ENCOUNTER — HOSPITAL ENCOUNTER (OUTPATIENT)
Dept: RADIOLOGY | Facility: HOSPITAL | Age: 68
Discharge: HOME | End: 2023-12-27
Attending: INTERNAL MEDICINE
Payer: COMMERCIAL

## 2023-12-27 DIAGNOSIS — I71.21 ANEURYSM OF ASCENDING AORTA (HCC): ICD-10-CM

## 2023-12-27 PROCEDURE — G1004 CDSM NDSC: HCPCS

## 2023-12-27 PROCEDURE — 71250 CT THORAX DX C-: CPT

## 2024-01-05 ENCOUNTER — TELEPHONE (OUTPATIENT)
Dept: CARDIOLOGY CLINIC | Facility: CLINIC | Age: 69
End: 2024-01-05

## 2024-01-05 NOTE — TELEPHONE ENCOUNTER
Received a call from radiology with findings on CT scan stating the size of the aorta 42mm, which is unchanged from previous study in 2020

## 2024-02-26 ENCOUNTER — OFFICE VISIT (OUTPATIENT)
Dept: CARDIOLOGY CLINIC | Facility: CLINIC | Age: 69
End: 2024-02-26
Payer: COMMERCIAL

## 2024-02-26 ENCOUNTER — TELEPHONE (OUTPATIENT)
Dept: NEUROLOGY | Facility: CLINIC | Age: 69
End: 2024-02-26

## 2024-02-26 VITALS
BODY MASS INDEX: 24.01 KG/M2 | HEART RATE: 59 BPM | WEIGHT: 187.1 LBS | OXYGEN SATURATION: 97 % | HEIGHT: 74 IN | DIASTOLIC BLOOD PRESSURE: 80 MMHG | SYSTOLIC BLOOD PRESSURE: 114 MMHG

## 2024-02-26 DIAGNOSIS — E78.5 HYPERLIPIDEMIA, UNSPECIFIED HYPERLIPIDEMIA TYPE: Chronic | ICD-10-CM

## 2024-02-26 DIAGNOSIS — I10 HYPERTENSION, UNSPECIFIED TYPE: Primary | ICD-10-CM

## 2024-02-26 DIAGNOSIS — I71.21 ANEURYSM OF ASCENDING AORTA WITHOUT RUPTURE (HCC): ICD-10-CM

## 2024-02-26 PROCEDURE — 93000 ELECTROCARDIOGRAM COMPLETE: CPT | Performed by: INTERNAL MEDICINE

## 2024-02-26 PROCEDURE — 99214 OFFICE O/P EST MOD 30 MIN: CPT | Performed by: INTERNAL MEDICINE

## 2024-02-26 NOTE — PROGRESS NOTES
[facility] Cardiology  Follow up note  Kwaku Castano 68 y.o. male MRN: [id number]        Problems    1. Hypertension, unspecified type  POCT ECG      2. Hyperlipidemia, unspecified hyperlipidemia type  POCT ECG    Lipid panel      3. Aneurysm of ascending aorta without rupture (HCC)  CT chest wo contrast          Impression:       TIA   small PFO identified remotely, aspirin prophylaxis ever since   He does not have any recurrent CNS symptoms  Hypertension  Blood pressure remains well-controlled  Hyperlipidemia  Nicely controlled from a primary prevention standpoint on rosuvastatin   ascending aortic aneurysm   44 mm by prior CTA, 42 mm by last noncontrast CT chest 12/23, and stability for many years has is continuing every other year reevaluation with noncontrast CT.  Importance of blood pressure control was discussed, his blood pressures are excellent  We discussed avoiding heavy lifting, anything over 50 pounds.    Plan:    Doing well, 2-year follow-up with repeat noncontrast CT December 2025.      HPI:   Kwaku Castano is a 68 y.o. year old male with TIA, small PFO, hypertension, hyperlipidemia, mild ascending aortic aneurysm,  Ambulatory dysfunction using a cane, comes to see me for a follow-up visit     He has no cardiac complaints.  He does have coronary calcification, hypercholesterolemia, rosuvastatin started and cholesterol better controlled.  Blood pressure is excellent.  He is not on any antihypertensive therapy.  He denies any new neurological symptoms.  No stroke/TIA.  He is having a lot of difficulty with his family which is very stressful.  His aneurysm has not changed, in fact measured slightly smaller, although in the context of a noncontrast CT in comparison to a prior CTA.      Review of Systems   Constitutional:  Negative for appetite change, diaphoresis, fatigue and fever.   Respiratory:  Negative for chest tightness, shortness of breath and wheezing.    Cardiovascular:  Negative for chest pain,  palpitations and leg swelling.   Gastrointestinal:  Negative for abdominal pain and blood in stool.   Musculoskeletal:  Negative for arthralgias and joint swelling.   Skin:  Negative for rash.   Neurological:  Negative for dizziness, syncope and light-headedness.       Past Medical History:   Diagnosis Date   • Aneurysm, ascending aorta (HCC)    • Anxiety disorder    • Arthritis     Last assessed: 11/17/16   • Asthma    • DDD (degenerative disc disease), cervical    • Depression    • Hyperlipidemia    • Hypertension    • Multiple fractures of ribs, left side, init for clos fx 11/15/2020   • Seizures (HCC)    • Stroke syndrome      Social History     Substance and Sexual Activity   Alcohol Use Not Currently    Comment: Occasionally     Social History     Substance and Sexual Activity   Drug Use Yes   • Types: Marijuana    Comment: medical marijuana     Social History     Tobacco Use   Smoking Status Former   Smokeless Tobacco Never       Allergies:  Allergies   Allergen Reactions   • Gluten Meal - Food Allergy Other (See Comments)     UNKNOWN       Medications:     Current Outpatient Medications:   •  acetaminophen (TYLENOL) 325 mg tablet, Take 3 tablets (975 mg total) by mouth every 8 (eight) hours, Disp: 1 Bottle, Rfl: 0  •  aspirin 81 MG tablet, Take 81 mg by mouth daily., Disp: , Rfl:   •  Calcium Carbonate-Vitamin D 600-200 MG-UNIT TABS, , Disp: , Rfl:   •  Cholecalciferol (VITAMIN D3) 2000 units TABS, Take 2,000 Units by mouth daily, Disp: , Rfl:   •  gabapentin (NEURONTIN) 600 MG tablet, TAKE ONE TABLET BY MOUTH TWICE A DAY, Disp: 60 tablet, Rfl: 5  •  levETIRAcetam (KEPPRA) 500 mg tablet, TAKE TWO TABLETS BY MOUTH EVERY 12 HOURS, Disp: 360 tablet, Rfl: 3  •  rosuvastatin (CRESTOR) 10 MG tablet, TAKE ONE TABLET BY MOUTH EVERY DAY, Disp: 90 tablet, Rfl: 3  •  tiZANidine (ZANAFLEX) 2 mg tablet, Take 1 tablet (2 mg total) by mouth daily at bedtime as needed for muscle spasms May make you drowsy. Do not drive  until you know how it affects you., Disp: 14 tablet, Rfl: 0  •  CANNABIDIOL PO, Take by mouth   (Patient not taking: Reported on 10/10/2023), Disp: , Rfl:   •  chlorhexidine (PERIDEX) 0.12 % solution, RINSE WITH 1/2 FLUID OUNCE TWICE A DAY FOR 30 SECONDS AFTER MEALS FOR 21 DAYS (Patient not taking: Reported on 10/10/2023), Disp: , Rfl:   •  meloxicam (MOBIC) 15 mg tablet, TAKE ONE TABLET BY MOUTH EVERY DAY WITH FOOD AS NEEDED FOR LOWER BACK PAIN., Disp: 90 tablet, Rfl: 1  •  Omega-3 Fatty Acids (FISH OIL) 1200 MG CAPS, Take by mouth daily (Patient not taking: Reported on 10/10/2023), Disp: , Rfl:       Vitals:    02/26/24 0805   BP: 114/80   Pulse: 59   SpO2: 97%     Weight (last 2 days)     None        Physical Exam  Constitutional:       General: He is not in acute distress.     Appearance: He is not diaphoretic.   HENT:      Head: Normocephalic and atraumatic.   Eyes:      General: No scleral icterus.     Conjunctiva/sclera: Conjunctivae normal.   Neck:      Vascular: No JVD.   Cardiovascular:      Rate and Rhythm: Normal rate and regular rhythm.      Heart sounds: Normal heart sounds. No murmur heard.  Pulmonary:      Effort: Pulmonary effort is normal. No respiratory distress.      Breath sounds: Normal breath sounds. No decreased breath sounds, wheezing, rhonchi or rales.   Musculoskeletal:      Cervical back: Normal range of motion.      Right lower leg: Normal. No edema.      Left lower leg: Normal. No edema.   Skin:     General: Skin is warm and dry.   Neurological:      Mental Status: He is alert and oriented to person, place, and time.         Laboratory Studies:    Laboratory studies personally reviewed    Cardiac testing:     EKG reviewed personally:    Results for orders placed or performed in visit on 02/26/24   POCT ECG    Impression    Sinus bradycardia, heart rate 59, left axis deviation         Stress Myoview   7/19-EF normal, inferior artifact    Echocardiogram   5/19-EF normal      Rickie Causey  "MD    Portions of the record may have been created with voice recognition software.  Occasional wrong word or \"sound a like\" substitutions may have occurred due to the inherent limitations of voice recognition software.  Read the chart carefully and recognize, using context, where substitutions have occurred.  "

## 2024-03-01 ENCOUNTER — TELEPHONE (OUTPATIENT)
Dept: NEUROLOGY | Facility: CLINIC | Age: 69
End: 2024-03-01

## 2024-03-01 NOTE — TELEPHONE ENCOUNTER
"MSW phoned patient's insurance, [employer], this date at [phone] and spoke with Hedy BUTT. She advised that patient does have benefits under his plan for up to 24 one way trips via car/van/wheelchair access van via [employer] and that trips are not to exceed 100 miles. Hedy provided the phone number for [facility] as [phone]. Reference # for call is [id number].    MSW phoned [facility] at [phone] and spoke with Shanice. She advised that they do not have patient listed as a [employer] patient in their system. Shanice put this writer on hold and checked with her \"back office\". The back office verified that they do not have this patient's insurance listed as [employer] so they cannot schedule transportation at this time despite this writer advising that [employer] had just confirmed that patient does have benefits with [employer] for transportation.     YEIMI then discussed the situation with [facility]  - ok to arrange a Lyft since such short notice.     MSW attempted to reach patient at [phone] to offer Lyft for Monday 3/4 and assistance for ongoing transportation. No answer. MSW left a message requesting a callback ASAP. Awaiting same.   "

## 2024-03-01 NOTE — TELEPHONE ENCOUNTER
"MSW phoned patient again at [phone] and was able to reach him. MSW offered Lyft ride due to such short notice. Patient assisted this writer in answering questions on Lyft Qualifier Tool. Patient aware of need to sign Lyft Waiver upon arrival to the office. Patient made aware to be ready one hour prior to his appt and to be alert to text messages with /car info and ETA.     MSW did educate patient that he should automatically qualify for [employer] shared ride, door to door service due to his age. Patient was not aware of this, but was interested in applying. MSW advised that patient will need to complete application. MSW placed a copy of the [employer] application and proof of age document in the mail to patient's home address this date. Address verified.     MSW sent the following Lyft request to the [employer] Pool:    \"Patients Name: Kwaku Castano  : 1955  Phone Number: [phone]  Appointment Date: MONDAY 3/4/24  Appointment time: 1015AM ARRIVAL, 1030AM APPT   Address: [address] [place] [address]  [address]  [facility] [address]  Drop off Facility/Office Name: [facility]  Drop off  Address: [address] [place] [address], [place] [address]  Cost Center: [phone]  Special notes/directions for   Note for scheduling team\"     Awaiting response.   "

## 2024-03-01 NOTE — TELEPHONE ENCOUNTER
Pt called in to confirm his appt for Monday 3/4 @ 10:30 with Dr. Roberto in .   Pt asked if there was anything available in [place] with Dr. Roberto. I let him know that hte next available is not until July. Pt said there is no bus route that goes to the [facility] office so he has to walk. He said he has done it the last 2 years but it is getting much harder. I let him know that I would get a message over to our social workers to see if there was any kind of transportation assistance that they could offer.     Please assist.

## 2024-03-04 ENCOUNTER — OFFICE VISIT (OUTPATIENT)
Dept: NEUROLOGY | Facility: CLINIC | Age: 69
End: 2024-03-04
Payer: COMMERCIAL

## 2024-03-04 VITALS
HEART RATE: 64 BPM | WEIGHT: 187.1 LBS | BODY MASS INDEX: 24.01 KG/M2 | TEMPERATURE: 96.5 F | DIASTOLIC BLOOD PRESSURE: 82 MMHG | SYSTOLIC BLOOD PRESSURE: 120 MMHG | HEIGHT: 74 IN

## 2024-03-04 DIAGNOSIS — G40.209 COMPLEX PARTIAL SEIZURE EVOLVING TO GENERALIZED SEIZURE (HCC): Primary | Chronic | ICD-10-CM

## 2024-03-04 PROCEDURE — G2211 COMPLEX E/M VISIT ADD ON: HCPCS | Performed by: PSYCHIATRY & NEUROLOGY

## 2024-03-04 PROCEDURE — 99213 OFFICE O/P EST LOW 20 MIN: CPT | Performed by: PSYCHIATRY & NEUROLOGY

## 2024-03-04 RX ORDER — LEVETIRACETAM 500 MG/1
1000 TABLET ORAL EVERY 12 HOURS
Qty: 360 TABLET | Refills: 3 | Status: SHIPPED | OUTPATIENT
Start: 2024-03-04

## 2024-03-04 NOTE — PROGRESS NOTES
"Patient ID: Kwaku Castano is a 68 y.o. male with prior brain injury and epilepsy, who is returning to Neurology office for follow up of his seizures.       Assessment/Plan:    Complex partial seizure evolving to generalized seizure (HCC)  He has not had any additional seizures since his last appointment and has been tolerating his levetiracetam well without any significant side effects.  He previously had mood issues when not taking this medication, but has been doing better since being more compliant.    --Given his prior traumatic head injury as a cause of his seizures, it would be best for him to continue to stay on levetiracetam going forward.  He will continue 1000 mg twice a day.  If he would have any additional seizures, his dose could be increased        He will Return in about 1 year (around 3/4/2025).      Subjective:    HPI  Current seizure medications:  1. Levetiracetam 1000 mg twice a day   Other medications as per Epic.    Since his last visit, he has not had any additional seizures. He reports that he has been fully compliant with his Levetiracetam and since being back on the medication, he hasn't had any other mood issues or other problems.     He does get depressed at times, but feels that is more situational. He denies any mood problems since being on Levetiracetam, if anything having more difficulty with mood when he was off the medication for a period of time in the past.     Prior Seizure Medications: Phenytoin (stopped in the late 80s due to prolonged period of seizure freedom)        Objective:    Blood pressure 120/82, pulse 64, temperature (!) 96.5 °F (35.8 °C), temperature source Temporal, height 6' 2\" (1.88 m), weight 84.9 kg (187 lb 1.6 oz).    Physical Exam    Neurological Exam      ROS:    Review of Systems   Constitutional:  Negative for appetite change, fatigue and fever.   HENT: Negative.  Negative for hearing loss, tinnitus, trouble swallowing and voice change.    Eyes: Negative.  " Negative for photophobia, pain and visual disturbance.   Respiratory: Negative.  Negative for shortness of breath.    Cardiovascular: Negative.  Negative for palpitations.   Gastrointestinal: Negative.  Negative for nausea and vomiting.   Endocrine: Negative.  Negative for cold intolerance.   Genitourinary: Negative.  Negative for dysuria, frequency and urgency.   Musculoskeletal:  Negative for back pain, gait problem, myalgias, neck pain and neck stiffness.   Skin: Negative.  Negative for rash.   Allergic/Immunologic: Negative.    Neurological: Negative.  Negative for dizziness, tremors, seizures, syncope, facial asymmetry, speech difficulty, weakness, light-headedness, numbness and headaches.   Hematological: Negative.  Does not bruise/bleed easily.   Psychiatric/Behavioral: Negative.  Negative for confusion, hallucinations and sleep disturbance.    All other systems reviewed and are negative.      I personally reviewed the ROS that was entered by the medical assistant    Voice recognition software was used in the generation of this note. There may be unintentional errors including grammatical errors, spelling errors, or pronoun errors.

## 2024-03-04 NOTE — TELEPHONE ENCOUNTER
MSW confirmed that patient arrived to his appt via Lyft this date. MSW spoke with MR Du in [place] - she will have patient sign a Lyft waiver.

## 2024-03-04 NOTE — TELEPHONE ENCOUNTER
MSW attempted to reach patient to confirm that he got home from his neurology appt via Lyft without issue. No answer at [phone]. MSW left a message requesting callback. Awaiting same.     MSW will also follow-up in a few days to ensure that patient receives/completes/returns the [employer] application for door to door, shared ride services.

## 2024-03-04 NOTE — TELEPHONE ENCOUNTER
"YEIMI received the following response from the [employer] Pool:    \"SCHEDULED LYFT 3/4 @ 09:30 AM PICKUP\"  "

## 2024-03-04 NOTE — ASSESSMENT & PLAN NOTE
He has not had any additional seizures since his last appointment and has been tolerating his levetiracetam well without any significant side effects.  He previously had mood issues when not taking this medication, but has been doing better since being more compliant.    --Given his prior traumatic head injury as a cause of his seizures, it would be best for him to continue to stay on levetiracetam going forward.  He will continue 1000 mg twice a day.  If he would have any additional seizures, his dose could be increased

## 2024-03-13 ENCOUNTER — RA CDI HCC (OUTPATIENT)
Dept: OTHER | Facility: HOSPITAL | Age: 69
End: 2024-03-13

## 2024-03-20 ENCOUNTER — OFFICE VISIT (OUTPATIENT)
Dept: FAMILY MEDICINE CLINIC | Facility: CLINIC | Age: 69
End: 2024-03-20
Payer: COMMERCIAL

## 2024-03-20 ENCOUNTER — APPOINTMENT (OUTPATIENT)
Dept: LAB | Facility: CLINIC | Age: 69
End: 2024-03-20
Payer: COMMERCIAL

## 2024-03-20 VITALS
RESPIRATION RATE: 16 BRPM | TEMPERATURE: 97.6 F | DIASTOLIC BLOOD PRESSURE: 82 MMHG | BODY MASS INDEX: 23.51 KG/M2 | SYSTOLIC BLOOD PRESSURE: 130 MMHG | HEIGHT: 74 IN | WEIGHT: 183.2 LBS

## 2024-03-20 DIAGNOSIS — G40.209 COMPLEX PARTIAL SEIZURE EVOLVING TO GENERALIZED SEIZURE (HCC): Chronic | ICD-10-CM

## 2024-03-20 DIAGNOSIS — M51.36 DDD (DEGENERATIVE DISC DISEASE), LUMBAR: ICD-10-CM

## 2024-03-20 DIAGNOSIS — E78.5 HYPERLIPIDEMIA, UNSPECIFIED HYPERLIPIDEMIA TYPE: Primary | Chronic | ICD-10-CM

## 2024-03-20 DIAGNOSIS — I71.21 ANEURYSM OF ASCENDING AORTA WITHOUT RUPTURE (HCC): ICD-10-CM

## 2024-03-20 DIAGNOSIS — R97.20 INCREASED PROSTATE SPECIFIC ANTIGEN (PSA) VELOCITY: ICD-10-CM

## 2024-03-20 DIAGNOSIS — F32.A CHRONIC DEPRESSIVE DISORDER: ICD-10-CM

## 2024-03-20 DIAGNOSIS — E78.5 HYPERLIPIDEMIA, UNSPECIFIED HYPERLIPIDEMIA TYPE: Chronic | ICD-10-CM

## 2024-03-20 PROBLEM — F33.9 DEPRESSION, RECURRENT (HCC): Chronic | Status: RESOLVED | Noted: 2021-05-11 | Resolved: 2024-03-20

## 2024-03-20 LAB
ALBUMIN SERPL BCP-MCNC: 4.6 G/DL (ref 3.5–5)
ALP SERPL-CCNC: 53 U/L (ref 34–104)
ALT SERPL W P-5'-P-CCNC: 13 U/L (ref 7–52)
ANION GAP SERPL CALCULATED.3IONS-SCNC: 11 MMOL/L (ref 4–13)
AST SERPL W P-5'-P-CCNC: 22 U/L (ref 13–39)
BILIRUB SERPL-MCNC: 0.43 MG/DL (ref 0.2–1)
BUN SERPL-MCNC: 24 MG/DL (ref 5–25)
CALCIUM SERPL-MCNC: 9.4 MG/DL (ref 8.4–10.2)
CHLORIDE SERPL-SCNC: 103 MMOL/L (ref 96–108)
CHOLEST SERPL-MCNC: 167 MG/DL
CO2 SERPL-SCNC: 27 MMOL/L (ref 21–32)
CREAT SERPL-MCNC: 0.79 MG/DL (ref 0.6–1.3)
ERYTHROCYTE [DISTWIDTH] IN BLOOD BY AUTOMATED COUNT: 12.8 % (ref 11.6–15.1)
GFR SERPL CREATININE-BSD FRML MDRD: 93 ML/MIN/1.73SQ M
GLUCOSE P FAST SERPL-MCNC: 87 MG/DL (ref 65–99)
HCT VFR BLD AUTO: 44.1 % (ref 36.5–49.3)
HDLC SERPL-MCNC: 46 MG/DL
HGB BLD-MCNC: 14 G/DL (ref 12–17)
LDLC SERPL CALC-MCNC: 109 MG/DL (ref 0–100)
MCH RBC QN AUTO: 30.8 PG (ref 26.8–34.3)
MCHC RBC AUTO-ENTMCNC: 31.7 G/DL (ref 31.4–37.4)
MCV RBC AUTO: 97 FL (ref 82–98)
NONHDLC SERPL-MCNC: 121 MG/DL
PLATELET # BLD AUTO: 229 THOUSANDS/UL (ref 149–390)
PMV BLD AUTO: 10.8 FL (ref 8.9–12.7)
POTASSIUM SERPL-SCNC: 4.6 MMOL/L (ref 3.5–5.3)
PROT SERPL-MCNC: 7.1 G/DL (ref 6.4–8.4)
RBC # BLD AUTO: 4.54 MILLION/UL (ref 3.88–5.62)
SODIUM SERPL-SCNC: 141 MMOL/L (ref 135–147)
TRIGL SERPL-MCNC: 58 MG/DL
TSH SERPL DL<=0.05 MIU/L-ACNC: 3.86 UIU/ML (ref 0.45–4.5)
WBC # BLD AUTO: 6.86 THOUSAND/UL (ref 4.31–10.16)

## 2024-03-20 PROCEDURE — G2211 COMPLEX E/M VISIT ADD ON: HCPCS | Performed by: FAMILY MEDICINE

## 2024-03-20 PROCEDURE — 85027 COMPLETE CBC AUTOMATED: CPT

## 2024-03-20 PROCEDURE — 80061 LIPID PANEL: CPT

## 2024-03-20 PROCEDURE — 99214 OFFICE O/P EST MOD 30 MIN: CPT | Performed by: FAMILY MEDICINE

## 2024-03-20 PROCEDURE — 36415 COLL VENOUS BLD VENIPUNCTURE: CPT

## 2024-03-20 PROCEDURE — 84443 ASSAY THYROID STIM HORMONE: CPT

## 2024-03-20 PROCEDURE — 84154 ASSAY OF PSA FREE: CPT

## 2024-03-20 PROCEDURE — 80053 COMPREHEN METABOLIC PANEL: CPT

## 2024-03-20 PROCEDURE — 80177 DRUG SCRN QUAN LEVETIRACETAM: CPT

## 2024-03-20 PROCEDURE — 84153 ASSAY OF PSA TOTAL: CPT

## 2024-03-20 RX ORDER — GABAPENTIN 600 MG/1
600 TABLET ORAL 2 TIMES DAILY
Qty: 180 TABLET | Refills: 3 | Status: SHIPPED | OUTPATIENT
Start: 2024-03-20

## 2024-03-20 NOTE — PROGRESS NOTES
Name: Kwaku Castano      : 1955      MRN: [id number]  Encounter Provider: Amanda Moreno MD  Encounter Date: 3/20/2024   Encounter department: [facility]    Assessment & Plan     1. Hyperlipidemia, unspecified hyperlipidemia type  Assessment & Plan:  Continue statin.  Proceed with blood work    Orders:  -     CBC; Future  -     Comprehensive metabolic panel; Future  -     TSH, 3rd generation; Future    2. DDD (degenerative disc disease), lumbar  Assessment & Plan:  Continue gabapentin and meloxicam    Orders:  -     gabapentin (NEURONTIN) 600 MG tablet; Take 1 tablet (600 mg total) by mouth 2 (two) times a day    3. Aneurysm of ascending aorta without rupture (HCC)  Assessment & Plan:  Stable CT chest 2023  Fusiform ectasia of the ascending thoracic aorta to 42 mm, unchanged compared to 2020  [facility] cardiology follows.  Patient will repeat testing in 2 years  He is aware of lifting restrictions      4. Complex partial seizure evolving to generalized seizure (HCC)  Assessment & Plan:  Patient is under care of [facility] urology.  He remains seizure-free.  Compliant with medications      5. Increased prostate specific antigen (PSA) velocity  Assessment & Plan:  The patient has established care with [facility] urology.  He will proceed with PSA retesting today      6. Chronic depressive disorder      Depression Screening and Follow-up Plan: Patient's depression screening was positive with a PHQ-9 score of 8. Patient assessed for underlying major depression. Brief counseling provided and recommend additional follow-up/re-evaluation next office visit. Patient with underlying depression and was advised to continue current medications as prescribed. Patient advised to follow-up with PCP for further management. Patient mitts to chronic symptoms of depression, primarily family stress-induced.  He declines any need for daily Rx, coping with stress well.  He remains on  gabapentin that has been helpful for mood control and lability.  He will contact me immediately if symptoms change or worsen.    Ongoing financial struggles.  Patient is interested in discussing it with our  and will contact us when he has working phone number for outreach.    Return in about 6 months (around 9/20/2024) for Medicare wellness, follow up.      Subjective     Follow-up chronic medical conditions.  Patient has been feeling generally well and stably.  He had recent follow-ups with cardiology, neurology and urology.  He is planning to proceed with blood work today.  CT chest was stable.  Unchanged size of ascending thoracic aorta.  Patient sandra seizure-free.  He ran out of gabapentin for a few days and has noticed increased mood swings.  He admits to chronic depression, primarily due to family related stress.  He is coping well and repetitively declines any need for daily Rx for depression or anxiety.        Review of Systems   Constitutional: Negative.    HENT: Negative.     Respiratory: Negative.     Cardiovascular: Negative.    Gastrointestinal: Negative.    Genitourinary: Negative.    Musculoskeletal:  Positive for arthralgias and back pain.   Neurological: Negative.    Psychiatric/Behavioral:          As per HPI       Past Medical History:   Diagnosis Date   • Aneurysm, ascending aorta (HCC)    • Anxiety disorder    • Arthritis     Last assessed: 11/17/16   • Asthma    • DDD (degenerative disc disease), cervical    • Depression    • Hyperlipidemia    • Hypertension    • Multiple fractures of ribs, left side, init for clos fx 11/15/2020   • Seizures (HCC)    • Stroke syndrome      Past Surgical History:   Procedure Laterality Date   • ANKLE SURGERY     • COLONOSCOPY  2009    Due 2014   • HERNIA REPAIR     • ROOT CANAL     • TOTAL HIP ARTHROPLASTY Left 02/2014   • TOTAL HIP ARTHROPLASTY Right      Family History   Problem Relation Age of Onset   • Anxiety disorder Mother          Symptom/disorder   • Hypertension Mother         Benign Essential   • Mitral valve prolapse Mother         Echo/Systolic   • PABLO disease Mother    • Fibromyalgia Mother    • Hyperlipidemia Mother    • Diabetes Father         Mellitus   • Cancer Brother    • Stroke Family         CVA   • Cancer Family    • Sudden death Family         Instantaneous Cardiac Death   • Other Family         Connective Tissue Defect     Social History     Socioeconomic History   • Marital status:      Spouse name: None   • Number of children: None   • Years of education: Bachelor's Degree   • Highest education level: None   Occupational History   • None   Tobacco Use   • Smoking status: Former   • Smokeless tobacco: Never   Vaping Use   • Vaping status: Former   • Quit date: 1/1/2011   Substance and Sexual Activity   • Alcohol use: Not Currently     Comment: Occasionally   • Drug use: Yes     Types: Marijuana     Comment: medical marijuana   • Sexual activity: None   Other Topics Concern   • None   Social History Narrative    Daily coffee consumption: 7 cups/day    Per [employer]: Currently      Social Determinants of Health     Financial Resource Strain: Low Risk  (9/14/2023)    Overall Financial Resource Strain (CARDIA)    • Difficulty of Paying Living Expenses: Not very hard   Food Insecurity: Not on file   Transportation Needs: No Transportation Needs (9/14/2023)    PRAPARE - Transportation    • Lack of Transportation (Medical): No    • Lack of Transportation (Non-Medical): No   Physical Activity: Not on file   Stress: Not on file   Social Connections: Not on file   Intimate Partner Violence: Not on file   Housing Stability: Not on file     Current Outpatient Medications on File Prior to Visit   Medication Sig   • acetaminophen (TYLENOL) 325 mg tablet Take 3 tablets (975 mg total) by mouth every 8 (eight) hours   • aspirin 81 MG tablet Take 81 mg by mouth daily.   • Calcium Carbonate-Vitamin D 600-200 MG-UNIT TABS    •  "CANNABIDIOL PO Take by mouth   • Cholecalciferol (VITAMIN D3) 2000 units TABS Take 2,000 Units by mouth daily   • levETIRAcetam (KEPPRA) 500 mg tablet Take 2 tablets (1,000 mg total) by mouth every 12 (twelve) hours   • meloxicam (MOBIC) 15 mg tablet TAKE ONE TABLET BY MOUTH EVERY DAY WITH FOOD AS NEEDED FOR LOWER BACK PAIN.   • rosuvastatin (CRESTOR) 10 MG tablet TAKE ONE TABLET BY MOUTH EVERY DAY   • chlorhexidine (PERIDEX) 0.12 % solution RINSE WITH 1/2 FLUID OUNCE TWICE A DAY FOR 30 SECONDS AFTER MEALS FOR 21 DAYS (Patient not taking: Reported on 10/10/2023)   • Omega-3 Fatty Acids (FISH OIL) 1200 MG CAPS Take by mouth daily (Patient not taking: Reported on 10/10/2023)     Allergies   Allergen Reactions   • Gluten Meal - Food Allergy Other (See Comments)     UNKNOWN     Immunization History   Administered Date(s) Administered   • COVID-19 MODERNA VACC 0.5 ML IM 03/25/2021, 04/22/2021, 11/04/2021   • COVID-19 Pfizer Vac BIVALENT Gonzales-sucrose 12 Yr+ IM 10/05/2022   • INFLUENZA 10/05/2022   • Influenza, high dose seasonal 0.7 mL 09/14/2023   • Influenza, recombinant, quadrivalent,injectable, preservative free 11/11/2020   • Influenza, seasonal, injectable 10/09/2014, 09/16/2015   • Pneumococcal Conjugate Vaccine 20-valent (Pcv20), Polysace 03/09/2023   • Tdap 11/14/2020   • Zoster Vaccine Recombinant 03/09/2023, 09/14/2023   • influenza, injectable, quadrivalent 10/29/2019       Objective     /82 (BP Location: Left arm, Patient Position: Sitting, Cuff Size: Large)   Temp 97.6 °F (36.4 °C) (Temporal)   Resp 16   Ht 6' 2\" (1.88 m)   Wt 83.1 kg (183 lb 3.2 oz)   BMI 23.52 kg/m²     Physical Exam  Vitals and nursing note reviewed.   Constitutional:       General: He is not in acute distress.     Appearance: Normal appearance. He is well-developed. He is not ill-appearing.   HENT:      Head: Normocephalic and atraumatic.   Eyes:      Conjunctiva/sclera: Conjunctivae normal.   Neck:      Vascular: No carotid " bruit.   Cardiovascular:      Rate and Rhythm: Normal rate and regular rhythm.      Heart sounds: Normal heart sounds. No murmur heard.  Pulmonary:      Effort: Pulmonary effort is normal. No respiratory distress.      Breath sounds: Normal breath sounds. No wheezing or rales.   Abdominal:      General: Bowel sounds are normal. There is no distension or abdominal bruit.   Musculoskeletal:      Cervical back: Neck supple.   Neurological:      General: No focal deficit present.      Mental Status: He is alert and oriented to person, place, and time.      Cranial Nerves: No cranial nerve deficit.   Psychiatric:         Mood and Affect: Mood normal.         Behavior: Behavior normal.       Amanda Moreno MD

## 2024-03-21 ENCOUNTER — TELEPHONE (OUTPATIENT)
Dept: UROLOGY | Facility: CLINIC | Age: 69
End: 2024-03-21

## 2024-03-21 LAB
PSA FREE MFR SERPL: 22.1 %
PSA FREE SERPL-MCNC: 0.64 NG/ML
PSA SERPL-MCNC: 2.9 NG/ML (ref 0–4)

## 2024-03-21 NOTE — TELEPHONE ENCOUNTER
----- Message from Mike Serna PA-C sent at 3/21/2024 10:02 AM EDT -----  PSA is stable.  Patient to follow-up in December 2024 as planned.      Tried calling patient to let him know that his PSA came back at 2.9 and that he can follow up as planned in December if patient calls back please relay the message. Could not leave a voice message.

## 2024-03-22 ENCOUNTER — TELEPHONE (OUTPATIENT)
Dept: UROLOGY | Facility: CLINIC | Age: 69
End: 2024-03-22

## 2024-03-22 NOTE — TELEPHONE ENCOUNTER
Called and spoke directly with patient. Patient was advised of provider's note attached below.   Patient verbalized understanding and thanked the office for the call.----- Message from Mike Serna PA-C sent at 3/21/2024 10:02 AM EDT -----  PSA is stable.  Patient to follow-up in December 2024 as planned

## 2024-03-23 PROBLEM — F32.A CHRONIC DEPRESSIVE DISORDER: Status: ACTIVE | Noted: 2024-03-23

## 2024-03-23 LAB — LEVETIRACETAM SERPL-MCNC: 22.5 UG/ML (ref 10–40)

## 2024-03-23 NOTE — ASSESSMENT & PLAN NOTE
Stable CT chest December 2023  Fusiform ectasia of the ascending thoracic aorta to 42 mm, unchanged compared to 11/14/2020  [facility] cardiology follows.  Patient will repeat testing in 2 years  He is aware of lifting restrictions

## 2024-03-23 NOTE — ASSESSMENT & PLAN NOTE
Patient is under care of [facility]'s urology.  He remains seizure-free.  Compliant with medications

## 2024-03-23 NOTE — ASSESSMENT & PLAN NOTE
The patient has established care with [facility] urology.  He will proceed with PSA retesting today

## 2024-03-25 ENCOUNTER — TELEPHONE (OUTPATIENT)
Dept: FAMILY MEDICINE CLINIC | Facility: CLINIC | Age: 69
End: 2024-03-25

## 2024-03-25 NOTE — TELEPHONE ENCOUNTER
----- Message from Amanda Moreno MD sent at 3/22/2024  4:53 PM EDT -----  Please contact patient.  All blood work was normal/stable.  PSA was 2.9, it is stable.  Urology reviewed results, they will see him in December.  Thank you

## 2024-06-15 DIAGNOSIS — M51.36 DDD (DEGENERATIVE DISC DISEASE), LUMBAR: ICD-10-CM

## 2024-06-15 RX ORDER — MELOXICAM 15 MG/1
TABLET ORAL
Qty: 90 TABLET | Refills: 1 | Status: SHIPPED | OUTPATIENT
Start: 2024-06-15

## 2024-06-29 ENCOUNTER — APPOINTMENT (EMERGENCY)
Dept: RADIOLOGY | Facility: HOSPITAL | Age: 69
End: 2024-06-29
Payer: COMMERCIAL

## 2024-06-29 ENCOUNTER — HOSPITAL ENCOUNTER (EMERGENCY)
Facility: HOSPITAL | Age: 69
Discharge: HOME | End: 2024-06-29
Attending: EMERGENCY MEDICINE
Payer: COMMERCIAL

## 2024-06-29 VITALS
OXYGEN SATURATION: 98 % | RESPIRATION RATE: 18 BRPM | TEMPERATURE: 98.9 F | HEART RATE: 65 BPM | DIASTOLIC BLOOD PRESSURE: 53 MMHG | SYSTOLIC BLOOD PRESSURE: 88 MMHG

## 2024-06-29 DIAGNOSIS — R55 SYNCOPE: Primary | ICD-10-CM

## 2024-06-29 DIAGNOSIS — R50.9 FEVER: ICD-10-CM

## 2024-06-29 DIAGNOSIS — I95.9 HYPOTENSION: ICD-10-CM

## 2024-06-29 LAB
2HR DELTA HS TROPONIN: 1 NG/L
ALBUMIN SERPL BCG-MCNC: 3.9 G/DL (ref 3.5–5)
ALP SERPL-CCNC: 54 U/L (ref 34–104)
ALT SERPL W P-5'-P-CCNC: 10 U/L (ref 7–52)
ANION GAP SERPL CALCULATED.3IONS-SCNC: 11 MMOL/L (ref 4–13)
AST SERPL W P-5'-P-CCNC: 16 U/L (ref 13–39)
BASOPHILS # BLD AUTO: 0.02 THOUSANDS/ÂΜL (ref 0–0.1)
BASOPHILS NFR BLD AUTO: 0 % (ref 0–1)
BILIRUB SERPL-MCNC: 0.89 MG/DL (ref 0.2–1)
BUN SERPL-MCNC: 17 MG/DL (ref 5–25)
CALCIUM SERPL-MCNC: 8.5 MG/DL (ref 8.4–10.2)
CARDIAC TROPONIN I PNL SERPL HS: 7 NG/L
CARDIAC TROPONIN I PNL SERPL HS: 8 NG/L
CHLORIDE SERPL-SCNC: 102 MMOL/L (ref 96–108)
CK SERPL-CCNC: 46 U/L (ref 39–308)
CO2 SERPL-SCNC: 20 MMOL/L (ref 21–32)
CREAT SERPL-MCNC: 0.97 MG/DL (ref 0.6–1.3)
EOSINOPHIL # BLD AUTO: 0 THOUSAND/ÂΜL (ref 0–0.61)
EOSINOPHIL NFR BLD AUTO: 0 % (ref 0–6)
ERYTHROCYTE [DISTWIDTH] IN BLOOD BY AUTOMATED COUNT: 12.5 % (ref 11.6–15.1)
FLUAV RNA RESP QL NAA+PROBE: NEGATIVE
FLUBV RNA RESP QL NAA+PROBE: NEGATIVE
GFR SERPL CREATININE-BSD FRML MDRD: 81 ML/MIN/1.73SQ M
GLUCOSE SERPL-MCNC: 111 MG/DL (ref 65–140)
HCT VFR BLD AUTO: 38.1 % (ref 36.5–49.3)
HGB BLD-MCNC: 12.9 G/DL (ref 12–17)
IMM GRANULOCYTES # BLD AUTO: 0.03 THOUSAND/UL (ref 0–0.2)
IMM GRANULOCYTES NFR BLD AUTO: 0 % (ref 0–2)
LEVETIRACETAM SERPL-MCNC: 11 UG/ML (ref 12–46)
LYMPHOCYTES # BLD AUTO: 0.68 THOUSANDS/ÂΜL (ref 0.6–4.47)
LYMPHOCYTES NFR BLD AUTO: 7 % (ref 14–44)
MCH RBC QN AUTO: 31.2 PG (ref 26.8–34.3)
MCHC RBC AUTO-ENTMCNC: 33.9 G/DL (ref 31.4–37.4)
MCV RBC AUTO: 92 FL (ref 82–98)
MONOCYTES # BLD AUTO: 1.14 THOUSAND/ÂΜL (ref 0.17–1.22)
MONOCYTES NFR BLD AUTO: 11 % (ref 4–12)
NEUTROPHILS # BLD AUTO: 8.25 THOUSANDS/ÂΜL (ref 1.85–7.62)
NEUTS SEG NFR BLD AUTO: 82 % (ref 43–75)
NRBC BLD AUTO-RTO: 0 /100 WBCS
PLATELET # BLD AUTO: 186 THOUSANDS/UL (ref 149–390)
PMV BLD AUTO: 10.2 FL (ref 8.9–12.7)
POTASSIUM SERPL-SCNC: 3.8 MMOL/L (ref 3.5–5.3)
PROT SERPL-MCNC: 6.6 G/DL (ref 6.4–8.4)
RBC # BLD AUTO: 4.14 MILLION/UL (ref 3.88–5.62)
RSV RNA RESP QL NAA+PROBE: NEGATIVE
SARS-COV-2 RNA RESP QL NAA+PROBE: NEGATIVE
SODIUM SERPL-SCNC: 133 MMOL/L (ref 135–147)
WBC # BLD AUTO: 10.12 THOUSAND/UL (ref 4.31–10.16)

## 2024-06-29 PROCEDURE — 36415 COLL VENOUS BLD VENIPUNCTURE: CPT | Performed by: EMERGENCY MEDICINE

## 2024-06-29 PROCEDURE — 72170 X-RAY EXAM OF PELVIS: CPT

## 2024-06-29 PROCEDURE — 70450 CT HEAD/BRAIN W/O DYE: CPT

## 2024-06-29 PROCEDURE — 93005 ELECTROCARDIOGRAM TRACING: CPT

## 2024-06-29 PROCEDURE — 0241U HB NFCT DS VIR RESP RNA 4 TRGT: CPT

## 2024-06-29 PROCEDURE — 96361 HYDRATE IV INFUSION ADD-ON: CPT

## 2024-06-29 PROCEDURE — 71260 CT THORAX DX C+: CPT

## 2024-06-29 PROCEDURE — 83520 IMMUNOASSAY QUANT NOS NONAB: CPT | Performed by: EMERGENCY MEDICINE

## 2024-06-29 PROCEDURE — 73552 X-RAY EXAM OF FEMUR 2/>: CPT

## 2024-06-29 PROCEDURE — 85025 COMPLETE CBC W/AUTO DIFF WBC: CPT | Performed by: EMERGENCY MEDICINE

## 2024-06-29 PROCEDURE — 84484 ASSAY OF TROPONIN QUANT: CPT | Performed by: EMERGENCY MEDICINE

## 2024-06-29 PROCEDURE — 80053 COMPREHEN METABOLIC PANEL: CPT | Performed by: EMERGENCY MEDICINE

## 2024-06-29 PROCEDURE — 82550 ASSAY OF CK (CPK): CPT | Performed by: EMERGENCY MEDICINE

## 2024-06-29 PROCEDURE — 74177 CT ABD & PELVIS W/CONTRAST: CPT

## 2024-06-29 PROCEDURE — 99285 EMERGENCY DEPT VISIT HI MDM: CPT | Performed by: EMERGENCY MEDICINE

## 2024-06-29 PROCEDURE — 96374 THER/PROPH/DIAG INJ IV PUSH: CPT

## 2024-06-29 PROCEDURE — 71045 X-RAY EXAM CHEST 1 VIEW: CPT

## 2024-06-29 PROCEDURE — 99285 EMERGENCY DEPT VISIT HI MDM: CPT

## 2024-06-29 RX ORDER — KETOROLAC TROMETHAMINE 30 MG/ML
15 INJECTION, SOLUTION INTRAMUSCULAR; INTRAVENOUS ONCE
Status: COMPLETED | OUTPATIENT
Start: 2024-06-29 | End: 2024-06-29

## 2024-06-29 RX ORDER — LEVETIRACETAM 500 MG/1
500 TABLET ORAL ONCE
Status: COMPLETED | OUTPATIENT
Start: 2024-06-29 | End: 2024-06-29

## 2024-06-29 RX ORDER — ACETAMINOPHEN 325 MG/1
975 TABLET ORAL ONCE
Status: COMPLETED | OUTPATIENT
Start: 2024-06-29 | End: 2024-06-29

## 2024-06-29 RX ADMIN — SODIUM CHLORIDE 1000 ML: 0.9 INJECTION, SOLUTION INTRAVENOUS at 11:53

## 2024-06-29 RX ADMIN — LEVETIRACETAM 500 MG: 500 TABLET, FILM COATED ORAL at 11:21

## 2024-06-29 RX ADMIN — SODIUM CHLORIDE 1000 ML: 0.9 INJECTION, SOLUTION INTRAVENOUS at 10:14

## 2024-06-29 RX ADMIN — ACETAMINOPHEN 975 MG: 325 TABLET, FILM COATED ORAL at 10:11

## 2024-06-29 RX ADMIN — KETOROLAC TROMETHAMINE 15 MG: 30 INJECTION, SOLUTION INTRAMUSCULAR; INTRAVENOUS at 10:32

## 2024-06-29 RX ADMIN — IOHEXOL 100 ML: 350 INJECTION, SOLUTION INTRAVENOUS at 12:10

## 2024-06-29 NOTE — ED PROVIDER NOTES
"History  Chief Complaint   Patient presents with    Syncope     Syncope today while standing at the store in line. + ASA -AC. -HS. His complaint is feeling \"cold\" and pain in his left hip. Did not eat a lot yesterday due to financial restraints. + chills last night. Felt dizzy prior to.      HPI  Patient is 68-year-old male presenting for syncopal episode while he was standing in line at the store.  Past medical history significant for seizure history on Keppra, AAA, patient takes aspirin daily.  Syncopal episode was witnessed, no concerns of seizure-like activity.  Denies any chest pain or palpitations prior to the episode, states his vision Tylenol and then blacked out.  Patient's episode lasted approximately 15 to 30 seconds.  Patient does state he missed his dose of Keppra this morning.  Patient endorses subjective fever/chills for the last several days as well as well as worsening left hip pain.  Denies any nausea vomiting diarrhea, cough, congestion.  Reports financial constraints limited amount he was able to eat yesterday.  Denies any sick contacts has not taken anything for symptoms.  Prior to Admission Medications   Prescriptions Last Dose Informant Patient Reported? Taking?   CANNABIDIOL PO  Self Yes No   Sig: Take by mouth   Calcium Carbonate-Vitamin D 600-200 MG-UNIT TABS  Self Yes No   Cholecalciferol (VITAMIN D3) 2000 units TABS  Self Yes No   Sig: Take 2,000 Units by mouth daily   Omega-3 Fatty Acids (FISH OIL) 1200 MG CAPS  Self Yes No   Sig: Take by mouth daily   Patient not taking: Reported on 10/10/2023   acetaminophen (TYLENOL) 325 mg tablet  Self No No   Sig: Take 3 tablets (975 mg total) by mouth every 8 (eight) hours   aspirin 81 MG tablet  Self Yes No   Sig: Take 81 mg by mouth daily.   chlorhexidine (PERIDEX) 0.12 % solution  Self Yes No   Sig: RINSE WITH 1/2 FLUID OUNCE TWICE A DAY FOR 30 SECONDS AFTER MEALS FOR 21 DAYS   Patient not taking: Reported on 10/10/2023   gabapentin (NEURONTIN) " 600 MG tablet   No No   Sig: Take 1 tablet (600 mg total) by mouth 2 (two) times a day   levETIRAcetam (KEPPRA) 500 mg tablet   No No   Sig: Take 2 tablets (1,000 mg total) by mouth every 12 (twelve) hours   meloxicam (MOBIC) 15 mg tablet   No No   Sig: TAKE ONE TABLET BY MOUTH EVERY DAY WITH FOOD AS NEEDED FOR LOWER BACK PAIN   rosuvastatin (CRESTOR) 10 MG tablet  Self No No   Sig: TAKE ONE TABLET BY MOUTH EVERY DAY      Facility-Administered Medications: None       Past Medical History:   Diagnosis Date    Aneurysm, ascending aorta (HCC)     Anxiety disorder     Arthritis     Last assessed: 11/17/16    Asthma     DDD (degenerative disc disease), cervical     Depression     Hyperlipidemia     Hypertension     Multiple fractures of ribs, left side, init for clos fx 11/15/2020    Seizures (HCC)     Stroke syndrome        Past Surgical History:   Procedure Laterality Date    ANKLE SURGERY      COLONOSCOPY  2009    Due 2014    HERNIA REPAIR      ROOT CANAL      TOTAL HIP ARTHROPLASTY Left 02/2014    TOTAL HIP ARTHROPLASTY Right        Family History   Problem Relation Age of Onset    Anxiety disorder Mother         Symptom/disorder    Hypertension Mother         Benign Essential    Mitral valve prolapse Mother         Echo/Systolic    PABLO disease Mother     Fibromyalgia Mother     Hyperlipidemia Mother     Diabetes Father         Mellitus    Cancer Brother     Stroke Family         CVA    Cancer Family     Sudden death Family         Instantaneous Cardiac Death    Other Family         Connective Tissue Defect     I have reviewed and agree with the history as documented.    E-Cigarette/Vaping    E-Cigarette Use Former User     Quit Date 1/1/11     Comments Quit  in 2011      E-Cigarette/Vaping Substances    Nicotine No     THC No     CBD No     Flavoring No     Other No     Unknown No      Social History     Tobacco Use    Smoking status: Former    Smokeless tobacco: Never   Vaping Use    Vaping status: Former    Quit  date: 1/1/2011   Substance Use Topics    Alcohol use: Not Currently     Comment: Occasionally    Drug use: Yes     Types: Marijuana     Comment: medical marijuana        Review of Systems   Constitutional:  Positive for chills and fever.   HENT: Negative.     Eyes: Negative.    Respiratory: Negative.     Cardiovascular: Negative.    Gastrointestinal: Negative.    Endocrine: Negative.    Genitourinary: Negative.    Musculoskeletal: Negative.    Allergic/Immunologic: Negative.    Neurological:  Positive for syncope.   Hematological: Negative.    Psychiatric/Behavioral: Negative.         Physical Exam  ED Triage Vitals   Temperature Pulse Respirations Blood Pressure SpO2   06/29/24 0956 06/29/24 0949 06/29/24 0949 06/29/24 0949 06/29/24 0949   (!) 102.7 °F (39.3 °C) 86 18 110/61 98 %      Temp Source Heart Rate Source Patient Position - Orthostatic VS BP Location FiO2 (%)   06/29/24 0956 06/29/24 0949 06/29/24 0949 06/29/24 0949 --   Axillary Monitor Lying Right arm       Pain Score       06/29/24 0949       5             Orthostatic Vital Signs  Vitals:    06/29/24 1130 06/29/24 1200 06/29/24 1224 06/29/24 1300   BP: (!) 89/58 (!) 84/57 (!) 85/54 (!) 88/53   Pulse: 70 65  65   Patient Position - Orthostatic VS:           Physical Exam  Vitals and nursing note reviewed.   Constitutional:       Appearance: Normal appearance. He is normal weight. He is ill-appearing.      Comments: Ill-appearing but in no acute distress.   HENT:      Head: Normocephalic and atraumatic.      Right Ear: Tympanic membrane, ear canal and external ear normal.      Left Ear: Tympanic membrane, ear canal and external ear normal.      Nose: Nose normal.      Mouth/Throat:      Mouth: Mucous membranes are moist.      Pharynx: Oropharynx is clear.   Eyes:      Extraocular Movements: Extraocular movements intact.      Conjunctiva/sclera: Conjunctivae normal.      Pupils: Pupils are equal, round, and reactive to light.   Cardiovascular:      Rate  and Rhythm: Normal rate and regular rhythm.      Pulses: Normal pulses.      Heart sounds: Normal heart sounds.   Pulmonary:      Effort: Pulmonary effort is normal.      Breath sounds: Normal breath sounds.   Abdominal:      General: Abdomen is flat. Bowel sounds are normal.      Palpations: Abdomen is soft.   Musculoskeletal:         General: No swelling, tenderness, deformity or signs of injury. Normal range of motion.      Cervical back: Normal range of motion and neck supple.   Skin:     General: Skin is warm and dry.      Capillary Refill: Capillary refill takes less than 2 seconds.   Neurological:      General: No focal deficit present.      Mental Status: He is alert and oriented to person, place, and time.   Psychiatric:         Mood and Affect: Mood normal.         Behavior: Behavior normal.         Thought Content: Thought content normal.         Judgment: Judgment normal.         ED Medications  Medications   acetaminophen (TYLENOL) tablet 975 mg (975 mg Oral Given 6/29/24 1011)   levETIRAcetam (KEPPRA) tablet 500 mg (500 mg Oral Given 6/29/24 1121)   sodium chloride 0.9 % bolus 1,000 mL (0 mL Intravenous Stopped 6/29/24 1114)   ketorolac (TORADOL) injection 15 mg (15 mg Intravenous Given 6/29/24 1032)   sodium chloride 0.9 % bolus 1,000 mL (0 mL Intravenous Stopped 6/29/24 1320)   iohexol (OMNIPAQUE) 350 MG/ML injection (MULTI-DOSE) 100 mL (100 mL Intravenous Given 6/29/24 1210)       Diagnostic Studies  Results Reviewed       Procedure Component Value Units Date/Time    CK [[id number]]  (Normal) Collected: 06/29/24 1000    Lab Status: Final result Specimen: Blood from Arm, Left Updated: 06/29/24 1317     Total CK 46 U/L     HS Troponin I 2hr [[id number]]  (Normal) Collected: 06/29/24 1222    Lab Status: Final result Specimen: Blood from Arm, Left Updated: 06/29/24 1255     hs TnI 2hr 8 ng/L      Delta 2hr hsTnI 1 ng/L     FLU/RSV/COVID - if FLU/RSV clinically relevant [[id number]]  (Normal) Collected:  06/29/24 1035    Lab Status: Final result Specimen: Nares from Nose Updated: 06/29/24 1135     SARS-CoV-2 Negative     INFLUENZA A PCR Negative     INFLUENZA B PCR Negative     RSV PCR Negative    Narrative:      FOR PEDIATRIC PATIENTS - copy/paste COVID Guidelines URL to browser: [email or web address]    SARS-CoV-2 assay is a Nucleic Acid Amplification assay intended for the  qualitative detection of nucleic acid from SARS-CoV-2 in nasopharyngeal  swabs. Results are for the presumptive identification of SARS-CoV-2 RNA.    Positive results are indicative of infection with SARS-CoV-2, the virus  causing COVID-19, but do not rule out bacterial infection or co-infection  with other viruses. Laboratories within the [place] and its  territories are required to report all positive results to the appropriate  public health authorities. Negative results do not preclude SARS-CoV-2  infection and should not be used as the sole basis for treatment or other  patient management decisions. Negative results must be combined with  clinical observations, patient history, and epidemiological information.  This test has not been FDA cleared or approved.    This test has been authorized by FDA under an Emergency Use Authorization  (EUA). This test is only authorized for the duration of time the  declaration that circumstances exist justifying the authorization of the  emergency use of an in vitro diagnostic tests for detection of SARS-CoV-2  virus and/or diagnosis of COVID-19 infection under section 564(b)(1) of  the Act, 21 U.S.C. 360bbb-3(b)(1), unless the authorization is terminated  or revoked sooner. The test has been validated but independent review by FDA  and CLIA is pending.    Test performed using [employer]: This RT-PCR assay targets N2,  a region unique to SARS-CoV-2. A conserved region in the E-gene was chosen  for pan-Sarbecovirus detection which includes  "SARS-CoV-2.    According to CMS-2020-01-R, this platform meets the definition of high-throughput technology.    Levetiracetam level [[id number]]  (Abnormal) Collected: 06/29/24 1035    Lab Status: Final result Specimen: Blood from Arm, Left Updated: 06/29/24 1110     Levetiracetam Lvl 11.0 ug/mL     Narrative:      \"Brivaracetam (Briviact) interferes with measurements of levetiracetam in the ARK Levetiracetam Assay. Patients undergoing a switch in drug therapy involving Keppra and Briviact should not be monitored for levetiracetam using the ARK assay. Serum levels of levetiracetam and or brivaracetam should be confirmed by a valid chromatographic method if there is a possibility these drugs are co-present in circulation.\"    Comprehensive metabolic panel [[id number]]  (Abnormal) Collected: 06/29/24 1000    Lab Status: Final result Specimen: Blood from Arm, Left Updated: 06/29/24 1041     Sodium 133 mmol/L      Potassium 3.8 mmol/L      Chloride 102 mmol/L      CO2 20 mmol/L      ANION GAP 11 mmol/L      BUN 17 mg/dL      Creatinine 0.97 mg/dL      Glucose 111 mg/dL      Calcium 8.5 mg/dL      AST 16 U/L      ALT 10 U/L      Alkaline Phosphatase 54 U/L      Total Protein 6.6 g/dL      Albumin 3.9 g/dL      Total Bilirubin 0.89 mg/dL      eGFR 81 ml/min/1.73sq m     Narrative:      National Kidney Disease Foundation guidelines for Chronic Kidney Disease (CKD):     Stage 1 with normal or high GFR (GFR > 90 mL/min/1.73 square meters)    Stage 2 Mild CKD (GFR = 60-89 mL/min/1.73 square meters)    Stage 3A Moderate CKD (GFR = 45-59 mL/min/1.73 square meters)    Stage 3B Moderate CKD (GFR = 30-44 mL/min/1.73 square meters)    Stage 4 Severe CKD (GFR = 15-29 mL/min/1.73 square meters)    Stage 5 End Stage CKD (GFR <15 mL/min/1.73 square meters)  Note: GFR calculation is accurate only with a steady state creatinine    HS Troponin 0hr (reflex protocol) [[id number]]  (Normal) Collected: 06/29/24 1000    Lab Status: Final result " Specimen: Blood from Arm, Left Updated: 06/29/24 1038     hs TnI 0hr 7 ng/L     CBC and differential [[id number]]  (Abnormal) Collected: 06/29/24 1000    Lab Status: Final result Specimen: Blood from Arm, Left Updated: 06/29/24 1013     WBC 10.12 Thousand/uL      RBC 4.14 Million/uL      Hemoglobin 12.9 g/dL      Hematocrit 38.1 %      MCV 92 fL      MCH 31.2 pg      MCHC 33.9 g/dL      RDW 12.5 %      MPV 10.2 fL      Platelets 186 Thousands/uL      nRBC 0 /100 WBCs      Segmented % 82 %      Immature Grans % 0 %      Lymphocytes % 7 %      Monocytes % 11 %      Eosinophils Relative 0 %      Basophils Relative 0 %      Absolute Neutrophils 8.25 Thousands/µL      Absolute Immature Grans 0.03 Thousand/uL      Absolute Lymphocytes 0.68 Thousands/µL      Absolute Monocytes 1.14 Thousand/µL      Eosinophils Absolute 0.00 Thousand/µL      Basophils Absolute 0.02 Thousands/µL                    CT chest abdomen pelvis w contrast   Final Result by Devon Guidry MD (06/29 1303)      Ectatic ascending thoracic aorta measuring 4.2 cm. Follow-up in 1 year.               Workstation performed: [id number]         XR chest 1 view portable   Final Result by Jaylen Soni MD (06/29 1655)      No acute cardiopulmonary disease.            Workstation performed: [id number]         XR femur 2 views LEFT   Final Result by Jaylen Soni MD (06/29 1655)      No acute osseous abnormality.         Computerized Assisted Algorithm (CAA) may have been used to analyze all applicable images.      Workstation performed: [id number]         XR pelvis ap only 1 or 2 vw   Final Result by Jaylen Soni MD (06/29 1654)      No acute osseous abnormality.         Computerized Assisted Algorithm (CAA) may have been used to analyze all applicable images.      Workstation performed: [id number]         CT head without contrast   Final Result by Obdulio Jones DO (06/29 1206)      No acute intracranial  abnormality.                  Workstation performed: [id number]               Procedures  Procedures      ED Course  ED Course as of 06/29/24 1909   Sat Jun 29, 2024   1043 Sodium(!): 133   1043 hs TnI 0hr: 7   1043 Procedure Note: EKG  Date/Time: 06/29/24 10:43 AM   Interpreted by: Elmer Man  Indications / Diagnosis: syncope  ECG reviewed by me, the ED Provider: yes   The EKG demonstrates: normal EKG, normal sinus rhythm, unchanged from previous tracings  Rhythm: normal sinus  Intervals: normal intervals  Axis: normal axis  QRS/Blocks: normal QRS  ST Changes: No acute ST Changes, no STD/APRYL.     1317 Patient signed out AMA, discussed risk benefits, discussed the fact that he is hypotensive febrile and has left hip pain that he should stay for further medical treatment.  Patient understands that he should stay but would like to go home at this time and be discharged AMA.                                       Medical Decision Making  Patient is a 68-year-old male presenting for syncopal episode and fever and hypotension.  DDx: Viral syndrome, rule out pneumonia, aneurysm rupture, ACS, arrhythmia, electrolyte abnormality  Based on patient mentation physical exam finds, primary concern is for viral syndrome with vasovagal/syncopal episode.  However will obtain full cardiac workup, CT head given syncopal episode with head strike and patient is on aspirin.  CT head negative for any acute findings.  Lab workup negative however patient now hypotensive, requiring IV fluid resuscitation.  Tylenol given for fever.  CT chest abdomen pelvis ordered given hypotension.  No obvious abnormalities on CT chest abdomen pelvis.  Discussed with patient that given hypotension and fever he should stay for observation and fluid resuscitation and medication.  Patient states he does not want to stay at this time.  Plans to leave AMA.  Patient signed out AMA.  Patient understands risks including worsening of condition and death.  Patient  left AMA.    Problems Addressed:  Fever: acute illness or injury  Hypotension: acute illness or injury  Syncope: acute illness or injury    Amount and/or Complexity of Data Reviewed  Labs: ordered. Decision-making details documented in ED Course.  Radiology: ordered.    Risk  OTC drugs.  Prescription drug management.          Disposition  Final diagnoses:   Syncope   Fever   Hypotension     Time reflects when diagnosis was documented in both MDM as applicable and the Disposition within this note       Time User Action Codes Description Comment    6/29/2024  1:11 PM Elmer Man [R55] Syncope     6/29/2024  1:11 PM Elmer Man [R50.9] Fever     6/29/2024  1:11 PM Elmer Man [I95.9] Hypotension           ED Disposition       ED Disposition   AMA    Condition   --    Date/Time   Sat Jun 29, 2024  1:11 PM    Comment   Date: 6/29/2024  Patient: Kwaku Castano  Admitted: 6/29/2024  9:45 AM  Attending Provider: Imelda Mitchell MD    Kwaku Castano or his authorized caregiver has made the decision for the patient to leave the emergency department against the advice of  Dr. Man. He or his authorized caregiver has been informed and understands the inherent risks, including death, worsening of condition.  He or his authorized caregiver has decided to accept the responsibility for this decision. Kwaku Castano and misty barnhart have been advised that he may return for further evaluation or treatment. His condition at time of discharge was stable.  Kwaku Castano had current vital signs as follows:  BP (!) 88/53   Pulse 65   Temp 98.9 °F (37.2 °C) (Axilla ry)   Resp 18                Follow-up Information       Follow up With Specialties Details Why Contact Info Additional Information    [facility] Emergency Department Emergency Medicine Go to  If symptoms worsen [address] [address] [phone] [facility] Emergency Department, [address]  [place], [place], [address]   [phone]    Amanda Moreno MD Family Medicine Go to  If symptoms worsen [address] [place] [address]  [place] [address]  [phone]               Discharge Medication List as of 6/29/2024  1:12 PM        CONTINUE these medications which have NOT CHANGED    Details   acetaminophen (TYLENOL) 325 mg tablet Take 3 tablets (975 mg total) by mouth every 8 (eight) hours, Starting Sun 11/15/2020, Normal      aspirin 81 MG tablet Take 81 mg by mouth daily., Historical Med      Calcium Carbonate-Vitamin D 600-200 MG-UNIT TABS Historical Med      CANNABIDIOL PO Take by mouth, Historical Med      chlorhexidine (PERIDEX) 0.12 % solution RINSE WITH 1/2 FLUID OUNCE TWICE A DAY FOR 30 SECONDS AFTER MEALS FOR 21 DAYS, Historical Med      Cholecalciferol (VITAMIN D3) 2000 units TABS Take 2,000 Units by mouth daily, Historical Med      gabapentin (NEURONTIN) 600 MG tablet Take 1 tablet (600 mg total) by mouth 2 (two) times a day, Starting Wed 3/20/2024, Normal      levETIRAcetam (KEPPRA) 500 mg tablet Take 2 tablets (1,000 mg total) by mouth every 12 (twelve) hours, Starting Mon 3/4/2024, Normal      meloxicam (MOBIC) 15 mg tablet TAKE ONE TABLET BY MOUTH EVERY DAY WITH FOOD AS NEEDED FOR LOWER BACK PAIN, Normal      Omega-3 Fatty Acids (FISH OIL) 1200 MG CAPS Take by mouth daily, Historical Med      rosuvastatin (CRESTOR) 10 MG tablet TAKE ONE TABLET BY MOUTH EVERY DAY, Normal           No discharge procedures on file.    PDMP Review         Value Time User    PDMP Reviewed  Yes 11/15/2020 10:17 AM MINE Deluna             ED Provider  Attending physically available and evaluated Kwaku Castano. I managed the patient along with the ED Attending.    Electronically Signed by           Elmer Man MD  06/29/24 4662

## 2024-06-29 NOTE — DISCHARGE INSTRUCTIONS
Please return to the emergency department at any time or if any acute concerns or if you feel your condition worsens.

## 2024-06-29 NOTE — ED ATTENDING ATTESTATION
6/29/2024  I, Imelda Mitchell MD, saw and evaluated the patient. I have discussed the patient with the resident/non-physician practitioner and agree with the resident's/non-physician practitioner's findings, Plan of Care, and MDM as documented in the resident's/non-physician practitioner's note, except where noted. All available labs and Radiology studies were reviewed.  I was present for key portions of any procedure(s) performed by the resident/non-physician practitioner and I was immediately available to provide assistance.       At this point I agree with the current assessment done in the Emergency Department.  I have conducted an independent evaluation of this patient a history and physical is as follows:    OA: 69 y/o m with h/o HLD, AAA, seizures, DDD who presents s/p syncopal episode today. Pt was standing in line at a store when he felt lightheaded and had a syncopal event. Pt has had generalized weakness and subjective fevers/chills over the past day however he also states that he has been riding his bike more and also going on long walks over the past few days. History is somewhat inconsistent. Also c/o loose, nonbloody stool and occasional increased frequency of urination but denies other urinary sxms. Admits to feeling lightheadedness prior to syncope with tunnel vision and thinks he may have had palpitations but unsure. Denies cp/sob. No focal numbness/weakness/tingling. Does not think he struck his head. Unclear if pt takes ASA. Denies other blood thinners. Did not take his medications this morning and did not eat yet today. PE, NAD, borderline BP, NC/AT, mildly pale conjunctiva, neck supple/FROM. - JVD, RR, lungs CTAB, -w/r, abd soft, NT/ND, +BS, nonttp, -mass, - edema, - calf ttp, intact pulses, symmetric face, clear speech, [place], AAO. A/p 69 y/o m s/p syncopal event. Febrile in the ED. Eval for possible cardiac v neuro v infectious cause with labs, imaging, EKG, monitoring. IVF hydration.  Re-eval and treat accordingly.     ED Course     PT with hypotensive BP. Declines admission or further evaluation. Pt states he cannot stay in the hospital. Up and ambulating and asking to sign out AMA.     Critical Care Time  Procedures

## 2024-06-30 LAB
ATRIAL RATE: 91 BPM
P AXIS: 26 DEGREES
PR INTERVAL: 146 MS
QRS AXIS: -21 DEGREES
QRSD INTERVAL: 82 MS
QT INTERVAL: 348 MS
QTC INTERVAL: 428 MS
T WAVE AXIS: 22 DEGREES
VENTRICULAR RATE: 91 BPM

## 2024-06-30 PROCEDURE — 93010 ELECTROCARDIOGRAM REPORT: CPT | Performed by: INTERNAL MEDICINE

## 2024-07-22 ENCOUNTER — OFFICE VISIT (OUTPATIENT)
Dept: FAMILY MEDICINE CLINIC | Facility: CLINIC | Age: 69
End: 2024-07-22
Payer: COMMERCIAL

## 2024-07-22 VITALS
WEIGHT: 175.6 LBS | BODY MASS INDEX: 22.54 KG/M2 | SYSTOLIC BLOOD PRESSURE: 118 MMHG | TEMPERATURE: 97.8 F | OXYGEN SATURATION: 97 % | RESPIRATION RATE: 16 BRPM | HEIGHT: 74 IN | DIASTOLIC BLOOD PRESSURE: 72 MMHG | HEART RATE: 70 BPM

## 2024-07-22 DIAGNOSIS — G40.209 COMPLEX PARTIAL SEIZURE EVOLVING TO GENERALIZED SEIZURE (HCC): Chronic | ICD-10-CM

## 2024-07-22 DIAGNOSIS — R55 SYNCOPE, UNSPECIFIED SYNCOPE TYPE: Primary | ICD-10-CM

## 2024-07-22 DIAGNOSIS — R53.83 OTHER FATIGUE: ICD-10-CM

## 2024-07-22 DIAGNOSIS — I71.21 ANEURYSM OF ASCENDING AORTA WITHOUT RUPTURE (HCC): ICD-10-CM

## 2024-07-22 PROCEDURE — 99214 OFFICE O/P EST MOD 30 MIN: CPT | Performed by: FAMILY MEDICINE

## 2024-07-22 PROCEDURE — G2211 COMPLEX E/M VISIT ADD ON: HCPCS | Performed by: FAMILY MEDICINE

## 2024-07-22 NOTE — PROGRESS NOTES
Ambulatory Visit  Name: Kwaku Castano      : 1955      MRN: [id number]  Encounter Provider: Amanda Moreno MD  Encounter Date: 2024   Encounter department: [facility], [place]    Assessment & Plan   1. Syncope, unspecified syncope type  Assessment & Plan:  Patient was evaluated for syncopal episode on 2024 at [facility] ER.  He presented with high fever and hypotension.  The patient signed out AMA.    ED workup was essentially unremarkable aside from mild hyponatremia 133 and decreased Keppra level of 11.  Extensive imaging including chest x-ray, CT chest, abdomen pelvis, CT brain, x-ray of pelvis and femur was unremarkable.Patient reports overall significant improvement of symptoms.  Complains of residual fatigue which has improved.  Ongoing financial stressors.  Difficulty with nutrition/food.  He has lost 8 pounds since our last visit..  Proceed with blood work.  Orders:  -     Basic metabolic panel; Future  -     CBC and differential; Future  2. Other fatigue  -     Lyme Total AB W Reflex to IGM/IGG; Future  3. Complex partial seizure evolving to generalized seizure (HCC)  Assessment & Plan:  Patient reports missing 1 dose of Keppra on .  No reported seizure activity.  He has been compliant with Rx otherwise.  Follows with [facility] neurology.  Proceed with repeat labs including Keppra level  Orders:  -     Levetiracetam level; Future  4. Aneurysm of ascending aorta without rupture (HCC)  Assessment & Plan:  Recent CT chest abdomen and pelvis performed on 2024 confirms stable aneurysm at 4.2 cm           History of Present Illness     Patient presents for follow-up after emergency room visit on 2024  Reportedly he developed syncopal episode while standing in line at the checkout line at the local store.  Patient stated that his temperature was up to 107 Fahrenheit by EMT and then 103 on recheck.  Patient denies any preceding upper respiratory febrile  illness or known tick bites or rash.  He felt generally weak and had some diarrhea but no abdominal pain, nausea, vomiting or dyspepsia.    Patient had extensive workup in the ED and declined hospitalization.  He signed out AMA.  He feels better day by day now.    Diarrhea has resolved.  Fatigue is improving.    Patient has lost 8 pounds since our last office visit.      ED workup reviewed: Sodium 133, otherwise blood work was unremarkable.  Negative chest x-ray and CT brain.  No acute findings on CT chest, abdomen and pelvis.  X-rays of pelvis and left femur-unremarkable.  Patient denies any unusual arthritic pain, suffers from chronic hip and back pain, states they have not changed.    Patient is under care of [facility] neurology.  He missed Keppra that morning but otherwise has been compliant with medication.  Keppra level was slightly decreased on June 28 at 11 with a normal cutoff of 12.  No reports of any seizure type activity.      Review of Systems   Constitutional:  Positive for fatigue.        Afebrile now   HENT: Negative.     Eyes: Negative.    Respiratory: Negative.     Cardiovascular: Negative.    Gastrointestinal: Negative.    Musculoskeletal:  Positive for arthralgias and back pain.   Neurological:  Positive for syncope.        Syncopal episode on June 28, no recurrences   Psychiatric/Behavioral: Negative.       Past Medical History:   Diagnosis Date   • Aneurysm, ascending aorta (HCC)    • Anxiety disorder    • Arthritis     Last assessed: 11/17/16   • Asthma    • DDD (degenerative disc disease), cervical    • Depression    • Hyperlipidemia    • Hypertension    • Multiple fractures of ribs, left side, init for clos fx 11/15/2020   • Seizures (HCC)    • Stroke syndrome      Past Surgical History:   Procedure Laterality Date   • ANKLE SURGERY     • COLONOSCOPY  2009    Due 2014   • HERNIA REPAIR     • ROOT CANAL     • TOTAL HIP ARTHROPLASTY Left 02/2014   • TOTAL HIP ARTHROPLASTY Right      Family  History   Problem Relation Age of Onset   • Anxiety disorder Mother         Symptom/disorder   • Hypertension Mother         Benign Essential   • Mitral valve prolapse Mother         Echo/Systolic   • PABLO disease Mother    • Fibromyalgia Mother    • Hyperlipidemia Mother    • Diabetes Father         Mellitus   • Cancer Brother    • Stroke Family         CVA   • Cancer Family    • Sudden death Family         Instantaneous Cardiac Death   • Other Family         Connective Tissue Defect     Social History     Tobacco Use   • Smoking status: Former   • Smokeless tobacco: Never   Vaping Use   • Vaping status: Former   • Quit date: 1/1/2011   Substance and Sexual Activity   • Alcohol use: Not Currently     Comment: Occasionally   • Drug use: Yes     Types: Marijuana     Comment: medical marijuana   • Sexual activity: Not on file     Current Outpatient Medications on File Prior to Visit   Medication Sig   • acetaminophen (TYLENOL) 325 mg tablet Take 3 tablets (975 mg total) by mouth every 8 (eight) hours   • aspirin 81 MG tablet Take 81 mg by mouth daily.   • Calcium Carbonate-Vitamin D 600-200 MG-UNIT TABS    • CANNABIDIOL PO Take by mouth   • gabapentin (NEURONTIN) 600 MG tablet Take 1 tablet (600 mg total) by mouth 2 (two) times a day   • levETIRAcetam (KEPPRA) 500 mg tablet Take 2 tablets (1,000 mg total) by mouth every 12 (twelve) hours   • meloxicam (MOBIC) 15 mg tablet TAKE ONE TABLET BY MOUTH EVERY DAY WITH FOOD AS NEEDED FOR LOWER BACK PAIN   • rosuvastatin (CRESTOR) 10 MG tablet TAKE ONE TABLET BY MOUTH EVERY DAY   • chlorhexidine (PERIDEX) 0.12 % solution RINSE WITH 1/2 FLUID OUNCE TWICE A DAY FOR 30 SECONDS AFTER MEALS FOR 21 DAYS (Patient not taking: Reported on 10/10/2023)   • Cholecalciferol (VITAMIN D3) 2000 units TABS Take 2,000 Units by mouth daily (Patient not taking: Reported on 7/22/2024)   • Omega-3 Fatty Acids (FISH OIL) 1200 MG CAPS Take by mouth daily (Patient not taking: Reported on 10/10/2023)  "    Allergies   Allergen Reactions   • Gluten Meal - Food Allergy Other (See Comments)     UNKNOWN     Immunization History   Administered Date(s) Administered   • COVID-19 MODERNA VACC 0.5 ML IM 03/25/2021, 04/22/2021, 11/04/2021   • COVID-19 Pfizer Vac BIVALENT Gonzales-sucrose 12 Yr+ IM 10/05/2022   • INFLUENZA 10/05/2022   • Influenza, high dose seasonal 0.7 mL 09/14/2023   • Influenza, recombinant, quadrivalent,injectable, preservative free 11/11/2020   • Influenza, seasonal, injectable 10/09/2014, 09/16/2015   • Pneumococcal Conjugate Vaccine 20-valent (Pcv20), Polysace 03/09/2023   • Tdap 11/14/2020   • Zoster Vaccine Recombinant 03/09/2023, 09/14/2023   • influenza, injectable, quadrivalent 10/29/2019     Objective     /72 (BP Location: Left arm, Patient Position: Sitting, Cuff Size: Standard)   Pulse 70   Temp 97.8 °F (36.6 °C) (Temporal)   Resp 16   Ht 6' 2\" (1.88 m)   Wt 79.7 kg (175 lb 9.6 oz)   SpO2 97%   BMI 22.55 kg/m²     Physical Exam  Vitals and nursing note reviewed.   Constitutional:       General: He is not in acute distress.     Appearance: Normal appearance. He is not ill-appearing.   HENT:      Head: Normocephalic and atraumatic.   Eyes:      General: No scleral icterus.     Conjunctiva/sclera: Conjunctivae normal.   Neck:      Vascular: No carotid bruit.   Cardiovascular:      Rate and Rhythm: Normal rate and regular rhythm.      Heart sounds: Normal heart sounds. No murmur heard.  Pulmonary:      Effort: Pulmonary effort is normal. No respiratory distress.      Breath sounds: Normal breath sounds.   Abdominal:      General: Bowel sounds are normal. There is no distension.      Palpations: Abdomen is soft.   Musculoskeletal:         General: Normal range of motion.      Cervical back: Neck supple. No rigidity.      Right lower leg: No edema.      Left lower leg: No edema.   Skin:     General: Skin is warm.      Findings: No rash.   Neurological:      General: No focal deficit " present.      Mental Status: He is alert and oriented to person, place, and time.   Psychiatric:         Mood and Affect: Mood normal.         Behavior: Behavior normal.         Thought Content: Thought content normal.

## 2024-07-23 ENCOUNTER — APPOINTMENT (OUTPATIENT)
Dept: LAB | Facility: CLINIC | Age: 69
End: 2024-07-23
Payer: COMMERCIAL

## 2024-07-23 DIAGNOSIS — G40.209 COMPLEX PARTIAL SEIZURE EVOLVING TO GENERALIZED SEIZURE (HCC): Chronic | ICD-10-CM

## 2024-07-23 DIAGNOSIS — R53.83 OTHER FATIGUE: ICD-10-CM

## 2024-07-23 DIAGNOSIS — R55 SYNCOPE, UNSPECIFIED SYNCOPE TYPE: ICD-10-CM

## 2024-07-23 LAB
ANION GAP SERPL CALCULATED.3IONS-SCNC: 11 MMOL/L (ref 4–13)
B BURGDOR IGG+IGM SER QL IA: NEGATIVE
BASOPHILS # BLD AUTO: 0.06 THOUSANDS/ÂΜL (ref 0–0.1)
BASOPHILS NFR BLD AUTO: 1 % (ref 0–1)
BUN SERPL-MCNC: 18 MG/DL (ref 5–25)
CALCIUM SERPL-MCNC: 8.9 MG/DL (ref 8.4–10.2)
CHLORIDE SERPL-SCNC: 103 MMOL/L (ref 96–108)
CO2 SERPL-SCNC: 24 MMOL/L (ref 21–32)
CREAT SERPL-MCNC: 0.73 MG/DL (ref 0.6–1.3)
EOSINOPHIL # BLD AUTO: 0.21 THOUSAND/ÂΜL (ref 0–0.61)
EOSINOPHIL NFR BLD AUTO: 3 % (ref 0–6)
ERYTHROCYTE [DISTWIDTH] IN BLOOD BY AUTOMATED COUNT: 12.9 % (ref 11.6–15.1)
GFR SERPL CREATININE-BSD FRML MDRD: 95 ML/MIN/1.73SQ M
GLUCOSE P FAST SERPL-MCNC: 78 MG/DL (ref 65–99)
HCT VFR BLD AUTO: 36.1 % (ref 36.5–49.3)
HGB BLD-MCNC: 11.2 G/DL (ref 12–17)
IMM GRANULOCYTES # BLD AUTO: 0.04 THOUSAND/UL (ref 0–0.2)
IMM GRANULOCYTES NFR BLD AUTO: 1 % (ref 0–2)
LEVETIRACETAM SERPL-MCNC: 17.5 UG/ML (ref 12–46)
LYMPHOCYTES # BLD AUTO: 1.73 THOUSANDS/ÂΜL (ref 0.6–4.47)
LYMPHOCYTES NFR BLD AUTO: 22 % (ref 14–44)
MCH RBC QN AUTO: 29.4 PG (ref 26.8–34.3)
MCHC RBC AUTO-ENTMCNC: 31 G/DL (ref 31.4–37.4)
MCV RBC AUTO: 95 FL (ref 82–98)
MONOCYTES # BLD AUTO: 0.65 THOUSAND/ÂΜL (ref 0.17–1.22)
MONOCYTES NFR BLD AUTO: 8 % (ref 4–12)
NEUTROPHILS # BLD AUTO: 5.04 THOUSANDS/ÂΜL (ref 1.85–7.62)
NEUTS SEG NFR BLD AUTO: 65 % (ref 43–75)
NRBC BLD AUTO-RTO: 0 /100 WBCS
PLATELET # BLD AUTO: 337 THOUSANDS/UL (ref 149–390)
PMV BLD AUTO: 10.3 FL (ref 8.9–12.7)
POTASSIUM SERPL-SCNC: 4.2 MMOL/L (ref 3.5–5.3)
RBC # BLD AUTO: 3.81 MILLION/UL (ref 3.88–5.62)
SODIUM SERPL-SCNC: 138 MMOL/L (ref 135–147)
WBC # BLD AUTO: 7.73 THOUSAND/UL (ref 4.31–10.16)

## 2024-07-23 PROCEDURE — 83520 IMMUNOASSAY QUANT NOS NONAB: CPT

## 2024-07-23 PROCEDURE — 36415 COLL VENOUS BLD VENIPUNCTURE: CPT

## 2024-07-23 PROCEDURE — 80048 BASIC METABOLIC PNL TOTAL CA: CPT

## 2024-07-23 PROCEDURE — 86618 LYME DISEASE ANTIBODY: CPT

## 2024-07-23 PROCEDURE — 85025 COMPLETE CBC W/AUTO DIFF WBC: CPT

## 2024-07-24 PROBLEM — R55 SYNCOPE: Status: ACTIVE | Noted: 2024-07-24

## 2024-07-24 NOTE — ASSESSMENT & PLAN NOTE
Patient reports missing 1 dose of Keppra on June 28.  No reported seizure activity.  He has been compliant with Rx otherwise.  Follows with [facility]'s neurology.  Proceed with repeat labs including Keppra level

## 2024-07-24 NOTE — ASSESSMENT & PLAN NOTE
Patient was evaluated for syncopal episode on June 28, 2024 at [facility] ER.  He presented with high fever and hypotension.  The patient signed out AMA.    ED workup was essentially unremarkable aside from mild hyponatremia 133 and decreased Keppra level of 11.  Extensive imaging including chest x-ray, CT chest, abdomen pelvis, CT brain, x-ray of pelvis and femur was unremarkable.Patient reports overall significant improvement of symptoms.  Complains of residual fatigue which has improved.  Ongoing financial stressors.  Difficulty with nutrition/food.  He has lost 8 pounds since our last visit..  Proceed with blood work.

## 2024-07-26 ENCOUNTER — TELEPHONE (OUTPATIENT)
Age: 69
End: 2024-07-26

## 2024-07-26 ENCOUNTER — TELEPHONE (OUTPATIENT)
Dept: FAMILY MEDICINE CLINIC | Facility: CLINIC | Age: 69
End: 2024-07-26

## 2024-07-26 DIAGNOSIS — E53.8 VITAMIN B12 DEFICIENCY: ICD-10-CM

## 2024-07-26 DIAGNOSIS — D50.9 IRON DEFICIENCY ANEMIA, UNSPECIFIED IRON DEFICIENCY ANEMIA TYPE: Primary | ICD-10-CM

## 2024-07-26 RX ORDER — FERROUS SULFATE 324(65)MG
324 TABLET, DELAYED RELEASE (ENTERIC COATED) ORAL DAILY
Qty: 90 TABLET | Refills: 0 | Status: SHIPPED | OUTPATIENT
Start: 2024-07-26

## 2024-07-26 NOTE — TELEPHONE ENCOUNTER
Please contact the patient.  I reviewed results of recent blood work.  I am happy to report that Lyme test was negative but blood work indicates that he is slightly anemic.    Please advise him to consume nutritious diet    I will send prescription for iron tablets to the pharmacy, please advise to start 1 daily    Please ask patient to repeat nonfasting blood work in 4 to 6 weeks, orders placed.    Thank you

## 2024-07-29 DIAGNOSIS — E78.5 HYPERLIPIDEMIA, UNSPECIFIED HYPERLIPIDEMIA TYPE: ICD-10-CM

## 2024-07-29 RX ORDER — ROSUVASTATIN CALCIUM 10 MG/1
TABLET, COATED ORAL
Qty: 90 TABLET | Refills: 3 | Status: SHIPPED | OUTPATIENT
Start: 2024-07-29

## 2024-09-25 ENCOUNTER — APPOINTMENT (OUTPATIENT)
Dept: LAB | Facility: CLINIC | Age: 69
End: 2024-09-25
Payer: COMMERCIAL

## 2024-09-25 ENCOUNTER — OFFICE VISIT (OUTPATIENT)
Dept: FAMILY MEDICINE CLINIC | Facility: CLINIC | Age: 69
End: 2024-09-25

## 2024-09-25 VITALS
DIASTOLIC BLOOD PRESSURE: 68 MMHG | WEIGHT: 168.4 LBS | BODY MASS INDEX: 21.61 KG/M2 | RESPIRATION RATE: 16 BRPM | TEMPERATURE: 97.8 F | HEIGHT: 74 IN | SYSTOLIC BLOOD PRESSURE: 100 MMHG | OXYGEN SATURATION: 99 % | HEART RATE: 65 BPM

## 2024-09-25 DIAGNOSIS — F33.9 DEPRESSION, RECURRENT (HCC): ICD-10-CM

## 2024-09-25 DIAGNOSIS — Z23 NEED FOR COVID-19 VACCINE: ICD-10-CM

## 2024-09-25 DIAGNOSIS — E78.5 HYPERLIPIDEMIA, UNSPECIFIED HYPERLIPIDEMIA TYPE: Chronic | ICD-10-CM

## 2024-09-25 DIAGNOSIS — D50.9 IRON DEFICIENCY ANEMIA, UNSPECIFIED IRON DEFICIENCY ANEMIA TYPE: ICD-10-CM

## 2024-09-25 DIAGNOSIS — F32.A CHRONIC DEPRESSIVE DISORDER: ICD-10-CM

## 2024-09-25 DIAGNOSIS — I71.21 ANEURYSM OF ASCENDING AORTA WITHOUT RUPTURE (HCC): ICD-10-CM

## 2024-09-25 DIAGNOSIS — R63.4 WEIGHT LOSS, NON-INTENTIONAL: ICD-10-CM

## 2024-09-25 DIAGNOSIS — Z00.00 MEDICARE ANNUAL WELLNESS VISIT, SUBSEQUENT: Primary | ICD-10-CM

## 2024-09-25 DIAGNOSIS — Z23 ENCOUNTER FOR IMMUNIZATION: ICD-10-CM

## 2024-09-25 DIAGNOSIS — E53.8 VITAMIN B12 DEFICIENCY: ICD-10-CM

## 2024-09-25 DIAGNOSIS — G40.209 COMPLEX PARTIAL SEIZURE EVOLVING TO GENERALIZED SEIZURE (HCC): Chronic | ICD-10-CM

## 2024-09-25 LAB
ALBUMIN SERPL BCG-MCNC: 4.3 G/DL (ref 3.5–5)
ALP SERPL-CCNC: 66 U/L (ref 34–104)
ALT SERPL W P-5'-P-CCNC: 9 U/L (ref 7–52)
ANION GAP SERPL CALCULATED.3IONS-SCNC: 9 MMOL/L (ref 4–13)
AST SERPL W P-5'-P-CCNC: 18 U/L (ref 13–39)
BILIRUB SERPL-MCNC: 0.4 MG/DL (ref 0.2–1)
BUN SERPL-MCNC: 25 MG/DL (ref 5–25)
CALCIUM SERPL-MCNC: 9.2 MG/DL (ref 8.4–10.2)
CHLORIDE SERPL-SCNC: 104 MMOL/L (ref 96–108)
CHOLEST SERPL-MCNC: 136 MG/DL
CO2 SERPL-SCNC: 26 MMOL/L (ref 21–32)
CREAT SERPL-MCNC: 0.76 MG/DL (ref 0.6–1.3)
ERYTHROCYTE [DISTWIDTH] IN BLOOD BY AUTOMATED COUNT: 14.1 % (ref 11.6–15.1)
FERRITIN SERPL-MCNC: 132 NG/ML (ref 24–336)
GFR SERPL CREATININE-BSD FRML MDRD: 94 ML/MIN/1.73SQ M
GLUCOSE P FAST SERPL-MCNC: 86 MG/DL (ref 65–99)
HCT VFR BLD AUTO: 37.9 % (ref 36.5–49.3)
HDLC SERPL-MCNC: 34 MG/DL
HGB BLD-MCNC: 11.8 G/DL (ref 12–17)
IRON SATN MFR SERPL: 21 % (ref 15–50)
IRON SERPL-MCNC: 49 UG/DL (ref 50–212)
LDLC SERPL CALC-MCNC: 84 MG/DL (ref 0–100)
MCH RBC QN AUTO: 29.1 PG (ref 26.8–34.3)
MCHC RBC AUTO-ENTMCNC: 31.1 G/DL (ref 31.4–37.4)
MCV RBC AUTO: 93 FL (ref 82–98)
PLATELET # BLD AUTO: 255 THOUSANDS/UL (ref 149–390)
PMV BLD AUTO: 10.6 FL (ref 8.9–12.7)
POTASSIUM SERPL-SCNC: 4.4 MMOL/L (ref 3.5–5.3)
PROT SERPL-MCNC: 7.2 G/DL (ref 6.4–8.4)
RBC # BLD AUTO: 4.06 MILLION/UL (ref 3.88–5.62)
SODIUM SERPL-SCNC: 139 MMOL/L (ref 135–147)
TIBC SERPL-MCNC: 239 UG/DL (ref 250–450)
TRIGL SERPL-MCNC: 92 MG/DL
TSH SERPL DL<=0.05 MIU/L-ACNC: 5.27 UIU/ML (ref 0.45–4.5)
UIBC SERPL-MCNC: 190 UG/DL (ref 155–355)
VIT B12 SERPL-MCNC: 330 PG/ML (ref 180–914)
WBC # BLD AUTO: 6.28 THOUSAND/UL (ref 4.31–10.16)

## 2024-09-25 PROCEDURE — G0439 PPPS, SUBSEQ VISIT: HCPCS | Performed by: FAMILY MEDICINE

## 2024-09-25 PROCEDURE — 80053 COMPREHEN METABOLIC PANEL: CPT

## 2024-09-25 PROCEDURE — 84443 ASSAY THYROID STIM HORMONE: CPT

## 2024-09-25 PROCEDURE — 82728 ASSAY OF FERRITIN: CPT

## 2024-09-25 PROCEDURE — 90662 IIV NO PRSV INCREASED AG IM: CPT

## 2024-09-25 PROCEDURE — 90480 ADMN SARSCOV2 VAC 1/ONLY CMP: CPT

## 2024-09-25 PROCEDURE — 82607 VITAMIN B-12: CPT

## 2024-09-25 PROCEDURE — G0008 ADMIN INFLUENZA VIRUS VAC: HCPCS

## 2024-09-25 PROCEDURE — 99214 OFFICE O/P EST MOD 30 MIN: CPT | Performed by: FAMILY MEDICINE

## 2024-09-25 PROCEDURE — 83540 ASSAY OF IRON: CPT

## 2024-09-25 PROCEDURE — 83550 IRON BINDING TEST: CPT

## 2024-09-25 PROCEDURE — 36415 COLL VENOUS BLD VENIPUNCTURE: CPT

## 2024-09-25 PROCEDURE — 91320 SARSCV2 VAC 30MCG TRS-SUC IM: CPT

## 2024-09-25 PROCEDURE — 85027 COMPLETE CBC AUTOMATED: CPT

## 2024-09-25 PROCEDURE — 80061 LIPID PANEL: CPT

## 2024-09-25 NOTE — PROGRESS NOTES
Ambulatory Visit  Name: Kwaku Castano      : 1955      MRN: [id number]  Encounter Provider: Amanda Moreno MD  Encounter Date: 2024   Encounter department: [facility]    Assessment & Plan  Medicare annual wellness visit, subsequent         Hyperlipidemia, unspecified hyperlipidemia type  Continue Crestor 10 mg daily  Orders:    Comprehensive metabolic panel; Future    TSH, 3rd generation; Future    Lipid Panel with Direct LDL reflex; Future    Depression, recurrent (HCC)  Ongoing family related stress.  Patient denies any suicidal or homicidal ideation.  He declines Rx.         Need for COVID-19 vaccine    Orders:    COVID-19 Pfizer mRNA vaccine 12 yr and older (Comirnaty pre-filled syringe)    Encounter for immunization    Orders:    FLUZONE HIGH-DOSE: influenza vaccine, high-dose, preservative-free 0.7 mL    Weight loss, non-intentional  15 pound weight loss within past 6 months  Patient reports financial difficulties, he plans to reach out to local food bank.  Patient is not driving, has to walk to the grocery store  He has cut down consumption of red meat and started using apple cider vinegar  I suspect that ongoing emotional symptoms/depression could be contributing factor as well.  Recent CT chest abdomen and pelvis on 2024-unremarkable.  Patient offers no complaints of abdominal pain, nausea vomiting or dyspepsia.  He has bowel movements are normal and regular.  He consumes 2 meals per day.  Proceed with blood work today  Encouraged patient to consume 3 meals a day and stop apple cider vinegar that could be contributing to weight loss  2 months weight recheck with the nurse.         Chronic depressive disorder  Chronic symptoms of depression.  Patient declines Rx.  No SI/HI.  Ongoing family related stress         Aneurysm of ascending aorta without rupture (HCC)  Stable aneurysm detected on CT 2024, 4.2 cm.  Patient will be due for repeat imaging in  June 2025       2 months follow-up with a nurse for weight recheck  Return in about 6 months (around 3/25/2025) for follow up.    Complex partial seizure evolving to generalized seizure (HCC)  Continue Keppra, patient has been compliant            Preventive health issues were discussed with patient, and age appropriate screening tests were ordered as noted in patient's After Visit Summary. Personalized health advice and appropriate referrals for health education or preventive services given if needed, as noted in patient's After Visit Summary.    History of Present Illness     The patient presents for follow-up and Medicare wellness.    He has been on a rather restrictive diet due to financial limitations and lack of driving.    He walks to the grocery store.  He has recently reduced red meat consumption and consumes chicken few times per week.  Normal bowel movements, no abdominal pain, dyspepsia, nausea or vomiting.  He has lost 15 pounds within past 6 months, unintentionally.  He eats 2 meals per day.  Breakfast and dinner.  He does not feel hungry for lunch.  Patient has started apple cider vinegar supplement a few weeks ago.      Chronic low back and generalized arthritic pain.  He is on regimen of Mobic and gabapentin    Chronic symptoms of depression.  Primarily triggered by family related stress.  Patient is essentially not in contact with any of his family members.  He firmly declines need for medications.  Patient denies any suicidal homicidal ideation.    Patient had CT chest abdomen and pelvis on June 29, 2024 after episode of syncope.  No findings aside from stable ectatic thoracic aorta that has been followed for many years.       Patient Care Team:  Amanda Moreno MD as PCP - General  Amanda Moreno MD as PCP - PCP-[facility]  SRIRAM Mitchell MD Harshini Dhananjay, DO Jake Marais, MD Jennifer Anne Banzhof, MD Raymundo Killian  DO Gwendolyn    Review of Systems   Constitutional:  Positive for unexpected weight change.        15 pound weight loss within past 6 months   HENT: Negative.     Eyes: Negative.    Respiratory: Negative.     Cardiovascular: Negative.    Gastrointestinal: Negative.    Endocrine: Negative.    Genitourinary: Negative.    Musculoskeletal:  Positive for arthralgias and back pain.   Allergic/Immunologic: Negative.    Neurological: Negative.    Hematological: Negative.    Psychiatric/Behavioral:  Positive for dysphoric mood.         As per HPI     Medical History Reviewed by provider this encounter:  Tobacco  Allergies  Meds  Problems  Med Hx  Surg Hx  Fam Hx       Annual Wellness Visit Questionnaire   Kwaku is here for his Subsequent Wellness visit. Last Medicare Wellness visit information reviewed, patient interviewed and updates made to the record today.      Health Risk Assessment:   Patient rates overall health as fair. Patient feels that their physical health rating is same. Patient is satisfied with their life. Eyesight was rated as slightly worse. Hearing was rated as same. Patient feels that their emotional and mental health rating is same. Patients states they are sometimes angry. Patient states they are sometimes unusually tired/fatigued. Pain experienced in the last 7 days has been some. Patient's pain rating has been 6/10. Patient states that he has experienced no weight loss or gain in last 6 months.     Fall Risk Screening:   In the past year, patient has experienced: history of falling in past year    Number of falls: 1  Injured during fall?: No    Feels unsteady when standing or walking?: No    Worried about falling?: No      Home Safety:  Patient has trouble with stairs inside or outside of their home. Patient has working smoke alarms and has no working carbon monoxide detector. Home safety hazards include: none.     Nutrition:   Current diet is Regular.     Medications:   Patient is currently  taking over-the-counter supplements. OTC medications include: see medication list. Patient is able to manage medications.     Activities of Daily Living (ADLs)/Instrumental Activities of Daily Living (IADLs):   Walk and transfer into and out of bed and chair?: Yes  Dress and groom yourself?: Yes    Bathe or shower yourself?: Yes    Feed yourself? Yes  Manage your money, pay your bills and track your expenses?: Yes  Make your own meals?: Yes    Do your own shopping?: Yes    Previous Hospitalizations:   Any hospitalizations or ED visits within the last 12 months?: Yes    How many hospitalizations have you had in the last year?: 1-2    Advance Care Planning:   Living will: Yes    Durable POA for healthcare: No    Advanced directive: Yes      Cognitive Screening:   Provider or family/friend/caregiver concerned regarding cognition?: No    PREVENTIVE SCREENINGS      Cardiovascular Screening:    General: Screening Not Indicated and History Lipid Disorder      Diabetes Screening:     General: Screening Current    Due for: Blood Glucose      Colorectal Cancer Screening:     General: Screening Current      Prostate Cancer Screening:    General: Screening Current      Osteoporosis Screening:    General: Screening Not Indicated      Abdominal Aortic Aneurysm (AAA) Screening:    Risk factors include: age between 65-76 yo and tobacco use        General: Screening Current      Lung Cancer Screening:     General: Screening Not Indicated      Hepatitis C Screening:    General: Screening Current    Screening, Brief Intervention, and Referral to Treatment (SBIRT)    Screening  Typical number of drinks in a day: 0  Typical number of drinks in a week: 0  Interpretation: Low risk drinking behavior.    Single Item Drug Screening:  How often have you used an illegal drug (including marijuana) or a prescription medication for non-medical reasons in the past year? never    Single Item Drug Screen Score: 0  Interpretation: Negative screen for  "possible drug use disorder    Brief Intervention  Alcohol & drug use screenings were reviewed. No concerns regarding substance use disorder identified.     Other Counseling Topics:   Regular weightbearing exercise.     Social Determinants of Health     Financial Resource Strain: Low Risk  (9/14/2023)    Overall Financial Resource Strain (CARDIA)     Difficulty of Paying Living Expenses: Not very hard   Food Insecurity: No Food Insecurity (9/25/2024)    Hunger Vital Sign     Worried About Running Out of Food in the Last Year: Never true     Ran Out of Food in the Last Year: Never true   Transportation Needs: No Transportation Needs (9/25/2024)    PRAPARE - Transportation     Lack of Transportation (Medical): No     Lack of Transportation (Non-Medical): No   Housing Stability: Low Risk  (9/25/2024)    Housing Stability Vital Sign     Unable to Pay for Housing in the Last Year: No     Number of Times Moved in the Last Year: 1     Homeless in the Last Year: No   Utilities: Not At Risk (9/25/2024)    [facility] Utilities     Threatened with loss of utilities: No     No results found.    Objective     /68 (BP Location: Left arm, Patient Position: Sitting, Cuff Size: Large)   Pulse 65   Temp 97.8 °F (36.6 °C) (Temporal)   Resp 16   Ht 6' 2\" (1.88 m)   Wt 76.4 kg (168 lb 6.4 oz)   SpO2 99%   BMI 21.62 kg/m²     Physical Exam  Vitals and nursing note reviewed.   Constitutional:       General: He is not in acute distress.     Appearance: Normal appearance. He is well-developed. He is not ill-appearing.   HENT:      Head: Normocephalic and atraumatic.   Eyes:      General: No scleral icterus.     Conjunctiva/sclera: Conjunctivae normal.   Neck:      Vascular: No carotid bruit.   Cardiovascular:      Rate and Rhythm: Normal rate and regular rhythm.      Heart sounds: Normal heart sounds. No murmur heard.  Pulmonary:      Effort: Pulmonary effort is normal. No respiratory distress.      Breath sounds: Normal breath sounds. " No wheezing or rales.   Abdominal:      General: Bowel sounds are normal. There is no distension or abdominal bruit.      Palpations: Abdomen is soft.      Tenderness: There is no abdominal tenderness.      Hernia: No hernia is present.   Musculoskeletal:         General: Normal range of motion.      Cervical back: Neck supple. No rigidity.      Right lower leg: No edema.      Left lower leg: No edema.   Skin:     General: Skin is warm.      Findings: No rash.   Neurological:      General: No focal deficit present.      Mental Status: He is alert and oriented to person, place, and time.      Cranial Nerves: No cranial nerve deficit.   Psychiatric:         Mood and Affect: Mood normal.         Behavior: Behavior normal.         Thought Content: Thought content normal.

## 2024-09-25 NOTE — ASSESSMENT & PLAN NOTE
Continue Crestor 10 mg daily  Orders:    Comprehensive metabolic panel; Future    TSH, 3rd generation; Future    Lipid Panel with Direct LDL reflex; Future

## 2024-09-25 NOTE — PATIENT INSTRUCTIONS
Medicare Preventive Visit Patient Instructions  Thank you for completing your Welcome to Medicare Visit or Medicare Annual Wellness Visit today. Your next wellness visit will be due in one year (9/26/2025).  The screening/preventive services that you may require over the next 5-10 years are detailed below. Some tests may not apply to you based off risk factors and/or age. Screening tests ordered at today's visit but not completed yet may show as past due. Also, please note that scanned in results may not display below.  Preventive Screenings:  Service Recommendations Previous Testing/Comments   Colorectal Cancer Screening  Colonoscopy    Fecal Occult Blood Test (FOBT)/Fecal Immunochemical Test (FIT)  Fecal DNA/Cologuard Test  Flexible Sigmoidoscopy Age: 45-75 years old   Colonoscopy: every 10 years (May be performed more frequently if at higher risk)  OR  FOBT/FIT: every 1 year  OR  Cologuard: every 3 years  OR  Sigmoidoscopy: every 5 years  Screening may be recommended earlier than age 45 if at higher risk for colorectal cancer. Also, an individualized decision between you and your healthcare provider will decide whether screening between the ages of 76-85 would be appropriate. Colonoscopy: 10/17/2009  FOBT/FIT: 12/18/2021  Cologuard: 03/20/2023  Sigmoidoscopy: Not on file    Screening Current     Prostate Cancer Screening Individualized decision between patient and health care provider in men between ages of 55-69   Medicare will cover every 12 months beginning on the day after your 50th birthday PSA: 2.9 ng/mL     Screening Current     Hepatitis C Screening Once for adults born between 1945 and 1965  More frequently in patients at high risk for Hepatitis C Hep C Antibody: 11/14/2019    Screening Current   Diabetes Screening 1-2 times per year if you're at risk for diabetes or have pre-diabetes Fasting glucose: 78 mg/dL (7/23/2024)  A1C: 5.2 % (2/2/2022)  Screening Current   Cholesterol Screening Once every 5  years if you don't have a lipid disorder. May order more often based on risk factors. Lipid panel: 03/20/2024  Screening Not Indicated  History Lipid Disorder      Other Preventive Screenings Covered by Medicare:  Abdominal Aortic Aneurysm (AAA) Screening: covered once if your at risk. You're considered to be at risk if you have a family history of AAA or a male between the age of 65-75 who smoking at least 100 cigarettes in your lifetime.  Lung Cancer Screening: covers low dose CT scan once per year if you meet all of the following conditions: (1) Age 55-77; (2) No signs or symptoms of lung cancer; (3) Current smoker or have quit smoking within the last 15 years; (4) You have a tobacco smoking history of at least 20 pack years (packs per day x number of years you smoked); (5) You get a written order from a healthcare provider.  Glaucoma Screening: covered annually if you're considered high risk: (1) You have diabetes OR (2) Family history of glaucoma OR (3)  aged 50 and older OR (4)  American aged 65 and older  Osteoporosis Screening: covered every 2 years if you meet one of the following conditions: (1) Have a vertebral abnormality; (2) On glucocorticoid therapy for more than 3 months; (3) Have primary hyperparathyroidism; (4) On osteoporosis medications and need to assess response to drug therapy.  HIV Screening: covered annually if you're between the age of 15-65. Also covered annually if you are younger than 15 and older than 65 with risk factors for HIV infection. For pregnant patients, it is covered up to 3 times per pregnancy.    Immunizations:  Immunization Recommendations   Influenza Vaccine Annual influenza vaccination during flu season is recommended for all persons aged >= 6 months who do not have contraindications   Pneumococcal Vaccine   * Pneumococcal conjugate vaccine = PCV13 (Prevnar 13), PCV15 (Vaxneuvance), PCV20 (Prevnar 20)  * Pneumococcal polysaccharide vaccine = PPSV23  (Pneumovax) Adults 19-65 yo with certain risk factors or if 65+ yo  If never received any pneumonia vaccine: recommend Prevnar 20 (PCV20)  Give PCV20 if previously received 1 dose of PCV13 or PPSV23   Hepatitis B Vaccine 3 dose series if at intermediate or high risk (ex: diabetes, end stage renal disease, liver disease)   Respiratory syncytial virus (RSV) Vaccine - COVERED BY MEDICARE PART D  * RSVPreF3 (Arexvy) CDC recommends that adults 60 years of age and older may receive a single dose of RSV vaccine using shared clinical decision-making (SCDM)   Tetanus (Td) Vaccine - COST NOT COVERED BY MEDICARE PART B Following completion of primary series, a booster dose should be given every 10 years to maintain immunity against tetanus. Td may also be given as tetanus wound prophylaxis.   Tdap Vaccine - COST NOT COVERED BY MEDICARE PART B Recommended at least once for all adults. For pregnant patients, recommended with each pregnancy.   Shingles Vaccine (Shingrix) - COST NOT COVERED BY MEDICARE PART B  2 shot series recommended in those 19 years and older who have or will have weakened immune systems or those 50 years and older     Health Maintenance Due:      Topic Date Due   • Colorectal Cancer Screening  03/20/2026   • Hepatitis C Screening  Completed     Immunizations Due:      Topic Date Due   • COVID-19 Vaccine (5 - 2023-24 season) 09/01/2024   • Influenza Vaccine (1) 09/01/2024     Advance Directives   What are advance directives?  Advance directives are legal documents that state your wishes and plans for medical care. These plans are made ahead of time in case you lose your ability to make decisions for yourself. Advance directives can apply to any medical decision, such as the treatments you want, and if you want to donate organs.   What are the types of advance directives?  There are many types of advance directives, and each state has rules about how to use them. You may choose a combination of any of the  following:  Living will:  This is a written record of the treatment you want. You can also choose which treatments you do not want, which to limit, and which to stop at a certain time. This includes surgery, medicine, IV fluid, and tube feedings.   Durable power of  for healthcare (DPAHC):  This is a written record that states who you want to make healthcare choices for you when you are unable to make them for yourself. This person, called a proxy, is usually a family member or a friend. You may choose more than 1 proxy.  Do not resuscitate (DNR) order:  A DNR order is used in case your heart stops beating or you stop breathing. It is a request not to have certain forms of treatment, such as CPR. A DNR order may be included in other types of advance directives.  Medical directive:  This covers the care that you want if you are in a coma, near death, or unable to make decisions for yourself. You can list the treatments you want for each condition. Treatment may include pain medicine, surgery, blood transfusions, dialysis, IV or tube feedings, and a ventilator (breathing machine).  Values history:  This document has questions about your views, beliefs, and how you feel and think about life. This information can help others choose the care that you would choose.  Why are advance directives important?  An advance directive helps you control your care. Although spoken wishes may be used, it is better to have your wishes written down. Spoken wishes can be misunderstood, or not followed. Treatments may be given even if you do not want them. An advance directive may make it easier for your family to make difficult choices about your care.   Fall Prevention    Fall prevention  includes ways to make your home and other areas safer. It also includes ways you can move more carefully to prevent a fall. Health conditions that cause changes in your blood pressure, vision, or muscle strength and coordination may increase  your risk for falls. Medicines may also increase your risk for falls if they make you dizzy, weak, or sleepy.   Fall prevention tips:   Stand or sit up slowly.    Use assistive devices as directed.    Wear shoes that fit well and have soles that .    Wear a personal alarm.    Stay active.    Manage your medical conditions.    Home Safety Tips:  Add items to prevent falls in the bathroom.    Keep paths clear.    Install bright lights in your home.    Keep items you use often on shelves within reach.    Paint or place reflective tape on the edges of your stairs.       © Copyright [employer] 2018 Information is for End User's use only and may not be sold, redistributed or otherwise used for commercial purposes. All illustrations and images included in CareNotes® are the copyrighted property of A.D.A[employer]M., Inc. or [employer]

## 2024-09-29 ENCOUNTER — TELEPHONE (OUTPATIENT)
Dept: FAMILY MEDICINE CLINIC | Facility: CLINIC | Age: 69
End: 2024-09-29

## 2024-09-29 DIAGNOSIS — R63.4 WEIGHT LOSS, NON-INTENTIONAL: Primary | ICD-10-CM

## 2024-09-29 NOTE — ASSESSMENT & PLAN NOTE
Chronic symptoms of depression.  Patient declines Rx.  No SI/HI.  Ongoing family related stress

## 2024-09-29 NOTE — ASSESSMENT & PLAN NOTE
Stable aneurysm detected on CT June 29, 2024, 4.2 cm.  Patient will be due for repeat imaging in June 2025       2 months follow-up with a nurse for weight recheck  Return in about 6 months (around 3/25/2025) for follow up.

## 2024-09-29 NOTE — ASSESSMENT & PLAN NOTE
15 pound weight loss within past 6 months  Patient reports financial difficulties, he plans to reach out to local food bank.  Patient is not driving, has to walk to the grocery store  He has cut down consumption of red meat and started using apple cider vinegar  I suspect that ongoing emotional symptoms/depression could be contributing factor as well.  Recent CT chest abdomen and pelvis on June 29, 2024-unremarkable.  Patient offers no complaints of abdominal pain, nausea vomiting or dyspepsia.  He has bowel movements are normal and regular.  He consumes 2 meals per day.  Proceed with blood work today  Encouraged patient to consume 3 meals a day and stop apple cider vinegar that could be contributing to weight loss  2 months weight recheck with the nurse.

## 2024-09-30 NOTE — TELEPHONE ENCOUNTER
Please contact the patient regarding blood work results  Hemoglobin/anemia has improved  Patient should continue on iron daily and add vitamin B12 500 mcg daily, Rx sent to the pharmacy  Thyroid function is underactive, we will monitor    Patient should have blood work in 2 months at the time of his weight check.  We will monitor his blood counts and thyroid function.  He does not need to fast at that time.    Thank you

## 2024-11-01 DIAGNOSIS — D50.9 IRON DEFICIENCY ANEMIA, UNSPECIFIED IRON DEFICIENCY ANEMIA TYPE: ICD-10-CM

## 2024-11-01 RX ORDER — FERROUS SULFATE 324(65)MG
324 TABLET, DELAYED RELEASE (ENTERIC COATED) ORAL DAILY
Qty: 90 TABLET | Refills: 1 | Status: SHIPPED | OUTPATIENT
Start: 2024-11-01

## 2024-11-14 ENCOUNTER — TELEPHONE (OUTPATIENT)
Dept: NEUROLOGY | Facility: CLINIC | Age: 69
End: 2024-11-14

## 2024-11-17 ENCOUNTER — RA CDI HCC (OUTPATIENT)
Dept: OTHER | Facility: HOSPITAL | Age: 69
End: 2024-11-17

## 2024-11-25 ENCOUNTER — CLINICAL SUPPORT (OUTPATIENT)
Dept: FAMILY MEDICINE CLINIC | Facility: CLINIC | Age: 69
End: 2024-11-25
Payer: COMMERCIAL

## 2024-11-25 VITALS — WEIGHT: 178.4 LBS | BODY MASS INDEX: 22.91 KG/M2

## 2024-11-25 DIAGNOSIS — R63.5 WEIGHT GAIN: Primary | ICD-10-CM

## 2024-11-25 PROCEDURE — 99211 OFF/OP EST MAY X REQ PHY/QHP: CPT

## 2024-11-26 ENCOUNTER — TELEPHONE (OUTPATIENT)
Dept: FAMILY MEDICINE CLINIC | Facility: CLINIC | Age: 69
End: 2024-11-26

## 2024-11-26 NOTE — TELEPHONE ENCOUNTER
Please contact the patient.  I am happy to report that he has gained 10 pounds since our last office visit.  Please advise him to continue with balanced nutritious diet.  He should keep his regular scheduled checkup in March 2025.  Thank you

## 2024-12-12 ENCOUNTER — OFFICE VISIT (OUTPATIENT)
Dept: UROLOGY | Facility: AMBULATORY SURGERY CENTER | Age: 69
End: 2024-12-12
Payer: COMMERCIAL

## 2024-12-12 VITALS
SYSTOLIC BLOOD PRESSURE: 110 MMHG | HEART RATE: 67 BPM | OXYGEN SATURATION: 100 % | HEIGHT: 74 IN | DIASTOLIC BLOOD PRESSURE: 76 MMHG | WEIGHT: 172.6 LBS | BODY MASS INDEX: 22.15 KG/M2

## 2024-12-12 DIAGNOSIS — R97.20 INCREASED PROSTATE SPECIFIC ANTIGEN (PSA) VELOCITY: Primary | ICD-10-CM

## 2024-12-12 PROCEDURE — 99213 OFFICE O/P EST LOW 20 MIN: CPT

## 2024-12-12 NOTE — PROGRESS NOTES
"Office Visit- Urology  Kwaku Castano 1955 MRN: [id number]      Assessment/Discussion/Plan    69 y.o. male managed by     Prostate cancer screening/history of elevated PSA velocity  -Patient with history of increased PSA velocity.  In March 2023 his PSA increased to 4.0 from baseline 2.0  -Patient was unable to tolerate prostate exam at time of consultation with Dr. Carmona in April 2023 and he continues to defer a prostate exam at this point in time  -His PSA returned at 2.5 in November 2023 and most recently at 2.9 in March 2024  Advised patient to obtain updated PSA now.  If PSA is stable he can follow-up in a year with-repeat PSA at that point in time           Chief Complaint:   Kwaku is a 69 y.o. male presenting to the office for a follow up visit regarding  prostate cancer screening        Subjective    Hx 11/29/2023  Patient is a 68-year-old male who returns for follow-up for prostate cancer screening/elevated PSA velocity.  He was initially seen in consultation by Dr. Carmona in April 2023.  At that point in time patient had a rise in PSA to 4.0 measured in March 2023 from a baseline of 2 last checked in 2021.  DYLLAN was attempted but patient was unable to tolerate so exam was not performed.  Is been no significant family history of prostate cancer.  Patient elected to recheck PSA and it returned at 2.4 in May 2023 patient was advised that he can follow-up in 6 months with a PSA at that point in time.  He presents for follow-up today.  He has had 2 PSAs since check in May 2023 with 1 measurement of 3.34 most recently 2.53 in November 2023.  He denies any significant bothersome urinary symptoms.  No blood in the urine or dysuria.       Hx 12/12/2024  Patient is a 69-year-old male who presents to the office today for follow-up.  He denies any bothersome urinary tract symptoms.  He is very nervous about prostate examination and states that he would \"rather have his leg cut off then have a prostate examination\".  " Thankfully his PSA has remained stable and was last measured at 2.9 in March 2024.    ROS:   Review of Systems   Constitutional: Negative.  Negative for chills, fatigue and fever.   HENT: Negative.     Respiratory:  Negative for shortness of breath.    Cardiovascular:  Negative for chest pain.   Gastrointestinal: Negative.  Negative for abdominal pain.   Endocrine: Negative.    Musculoskeletal: Negative.    Skin: Negative.    Neurological: Negative.  Negative for dizziness and light-headedness.   Hematological: Negative.    Psychiatric/Behavioral: Negative.           Past Medical History  Past Medical History:   Diagnosis Date    Aneurysm, ascending aorta (HCC)     Anxiety disorder     Arthritis     Last assessed: 11/17/16    Asthma     DDD (degenerative disc disease), cervical     Depression     Hyperlipidemia     Hypertension     Multiple fractures of ribs, left side, init for clos fx 11/15/2020    Seizures (HCC)     Stroke syndrome        Past Surgical History  Past Surgical History:   Procedure Laterality Date    ANKLE SURGERY      COLONOSCOPY  2009    Due 2014    HERNIA REPAIR      ROOT CANAL      TOTAL HIP ARTHROPLASTY Left 02/2014    TOTAL HIP ARTHROPLASTY Right        Past Family History  Family History   Problem Relation Age of Onset    Anxiety disorder Mother         Symptom/disorder    Hypertension Mother         Benign Essential    Mitral valve prolapse Mother         Echo/Systolic    PABLO disease Mother     Fibromyalgia Mother     Hyperlipidemia Mother     Diabetes Father         Mellitus    Cancer Brother     Stroke Family         CVA    Cancer Family     Sudden death Family         Instantaneous Cardiac Death    Other Family         Connective Tissue Defect       Past Social history  Social History     Socioeconomic History    Marital status:      Spouse name: Not on file    Number of children: Not on file    Years of education: Bachelor's Degree    Highest education level: Not on file    Occupational History    Not on file   Tobacco Use    Smoking status: Former    Smokeless tobacco: Never   Vaping Use    Vaping status: Former    Quit date: 1/1/2011   Substance and Sexual Activity    Alcohol use: Not Currently     Comment: Occasionally    Drug use: Yes     Types: Marijuana     Comment: medical marijuana    Sexual activity: Not on file   Other Topics Concern    Not on file   Social History Narrative    Daily coffee consumption: 7 cups/day    Per [employer]: Currently      Social Drivers of Health     Financial Resource Strain: Low Risk  (9/14/2023)    Overall Financial Resource Strain (CARDIA)     Difficulty of Paying Living Expenses: Not very hard   Food Insecurity: No Food Insecurity (9/25/2024)    Nursing - Inadequate Food Risk Classification     Worried About Running Out of Food in the Last Year: Never true     Ran Out of Food in the Last Year: Never true     Ran Out of Food in the Last Year: Not on file   Transportation Needs: No Transportation Needs (9/25/2024)    PRAPARE - Transportation     Lack of Transportation (Medical): No     Lack of Transportation (Non-Medical): No   Physical Activity: Not on file   Stress: Not on file   Social Connections: Unknown (6/18/2024)    Received from [employer]    Social [employer]     How often do you feel lonely or isolated from those around you? (Adult - for ages 18 years and over): Not on file   Intimate Partner Violence: Not on file   Housing Stability: Low Risk  (9/25/2024)    Housing Stability Vital Sign     Unable to Pay for Housing in the Last Year: No     Number of Times Moved in the Last Year: 1     Homeless in the Last Year: No       Current Medications  Current Outpatient Medications   Medication Sig Dispense Refill    aspirin 81 MG tablet Take 81 mg by mouth daily.      Calcium Carbonate-Vitamin D 600-200 MG-UNIT TABS       CANNABIDIOL PO Take by mouth      Cholecalciferol (VITAMIN D3) 2000 units TABS Take 2,000 Units by mouth daily       "gabapentin (NEURONTIN) 600 MG tablet Take 1 tablet (600 mg total) by mouth 2 (two) times a day 180 tablet 3    levETIRAcetam (KEPPRA) 500 mg tablet Take 2 tablets (1,000 mg total) by mouth every 12 (twelve) hours 360 tablet 3    meloxicam (MOBIC) 15 mg tablet TAKE ONE TABLET BY MOUTH EVERY DAY WITH FOOD AS NEEDED FOR LOWER BACK PAIN 90 tablet 1    rosuvastatin (CRESTOR) 10 MG tablet TAKE ONE TABLET BY MOUTH EVERY DAY 90 tablet 3    acetaminophen (TYLENOL) 325 mg tablet Take 3 tablets (975 mg total) by mouth every 8 (eight) hours (Patient not taking: Reported on 12/12/2024) 1 Bottle 0    chlorhexidine (PERIDEX) 0.12 % solution RINSE WITH 1/2 FLUID OUNCE TWICE A DAY FOR 30 SECONDS AFTER MEALS FOR 21 DAYS (Patient not taking: Reported on 10/10/2023)      cyanocobalamin (VITAMIN B-12) 500 MCG tablet Take 1 tablet (500 mcg total) by mouth daily (Patient not taking: Reported on 12/12/2024) 100 tablet 1    ferrous sulfate 324 (65 Fe) mg TAKE ONE TABLET BY MOUTH EVERY DAY (Patient not taking: Reported on 12/12/2024) 90 tablet 1    Omega-3 Fatty Acids (FISH OIL) 1200 MG CAPS Take by mouth daily (Patient not taking: Reported on 10/10/2023)       No current facility-administered medications for this visit.       Allergies  Allergies   Allergen Reactions    Gluten Meal - Food Allergy Other (See Comments)     UNKNOWN       OBJECTIVE    Vitals   Vitals:    12/12/24 0749   BP: 110/76   BP Location: Left arm   Patient Position: Sitting   Cuff Size: Standard   Pulse: 67   SpO2: 100%   Weight: 78.3 kg (172 lb 9.6 oz)   Height: 6' 2\" (1.88 m)       PVR:    Physical Exam  Constitutional:       General: He is not in acute distress.     Appearance: Normal appearance. He is normal weight. He is not ill-appearing or toxic-appearing.   HENT:      Head: Normocephalic and atraumatic.   Eyes:      Conjunctiva/sclera: Conjunctivae normal.   Cardiovascular:      Rate and Rhythm: Normal rate.   Pulmonary:      Effort: Pulmonary effort is normal. " No respiratory distress.   Skin:     General: Skin is warm and dry.   Neurological:      General: No focal deficit present.      Mental Status: He is alert and oriented to person, place, and time.      Cranial Nerves: No cranial nerve deficit.   Psychiatric:         Mood and Affect: Mood normal.         Behavior: Behavior normal.         Thought Content: Thought content normal.          Labs:     Lab Results   Component Value Date    PSA 2.9 03/20/2024    PSA 2.53 11/20/2023    PSA 3.36 06/15/2023    PSA 2.4 05/02/2023     Lab Results   Component Value Date    CREATININE 0.76 09/25/2024      Lab Results   Component Value Date    HGBA1C 5.2 02/02/2022     Lab Results   Component Value Date    GLUCOSE 83 12/18/2015    CALCIUM 9.2 09/25/2024     12/18/2015    K 4.4 09/25/2024    CO2 26 09/25/2024     09/25/2024    BUN 25 09/25/2024    CREATININE 0.76 09/25/2024       I have personally reviewed all pertinent lab results and reviewed with patient    Imaging       Mike Serna PA-C  Date: 12/12/2024 Time: 7:53 AM  [facility]    This note was written using fluency dictation software. Please excuse any resulting minor grammatical errors.

## 2024-12-31 DIAGNOSIS — M51.369 DDD (DEGENERATIVE DISC DISEASE), LUMBAR: ICD-10-CM

## 2025-01-02 RX ORDER — MELOXICAM 15 MG/1
TABLET ORAL
Qty: 90 TABLET | Refills: 1 | Status: SHIPPED | OUTPATIENT
Start: 2025-01-02

## 2025-01-07 ENCOUNTER — TELEPHONE (OUTPATIENT)
Age: 70
End: 2025-01-07

## 2025-01-08 ENCOUNTER — TELEPHONE (OUTPATIENT)
Dept: FAMILY MEDICINE CLINIC | Facility: CLINIC | Age: 70
End: 2025-01-08

## 2025-01-08 NOTE — TELEPHONE ENCOUNTER
Patient came in and is requesting a letter of diagnosis he is applying  for medicaid. Please advise

## 2025-01-13 NOTE — TELEPHONE ENCOUNTER
Called patient and made aware that letter is ready for . Pt will be in tomorrow 1/14 to pick it up.     Letter printed & placed in file drawer at .

## 2025-01-28 DIAGNOSIS — E78.5 HYPERLIPIDEMIA, UNSPECIFIED HYPERLIPIDEMIA TYPE: ICD-10-CM

## 2025-01-28 DIAGNOSIS — R63.4 WEIGHT LOSS, NON-INTENTIONAL: ICD-10-CM

## 2025-01-28 DIAGNOSIS — M51.369 DDD (DEGENERATIVE DISC DISEASE), LUMBAR: ICD-10-CM

## 2025-01-28 DIAGNOSIS — D50.9 IRON DEFICIENCY ANEMIA, UNSPECIFIED IRON DEFICIENCY ANEMIA TYPE: ICD-10-CM

## 2025-01-28 RX ORDER — ROSUVASTATIN CALCIUM 10 MG/1
10 TABLET, COATED ORAL DAILY
Qty: 90 TABLET | Refills: 1 | Status: SHIPPED | OUTPATIENT
Start: 2025-01-28

## 2025-01-28 RX ORDER — MELOXICAM 15 MG/1
TABLET ORAL
Qty: 90 TABLET | Refills: 1 | Status: SHIPPED | OUTPATIENT
Start: 2025-01-28

## 2025-01-28 RX ORDER — FERROUS SULFATE 324(65)MG
324 TABLET, DELAYED RELEASE (ENTERIC COATED) ORAL DAILY
Qty: 90 TABLET | Refills: 1 | Status: SHIPPED | OUTPATIENT
Start: 2025-01-28

## 2025-01-28 RX ORDER — GABAPENTIN 600 MG/1
600 TABLET ORAL 2 TIMES DAILY
Qty: 180 TABLET | Refills: 1 | Status: SHIPPED | OUTPATIENT
Start: 2025-01-28

## 2025-01-29 DIAGNOSIS — G40.209 COMPLEX PARTIAL SEIZURE EVOLVING TO GENERALIZED SEIZURE (HCC): Chronic | ICD-10-CM

## 2025-01-29 RX ORDER — LEVETIRACETAM 500 MG/1
1000 TABLET ORAL EVERY 12 HOURS
Qty: 360 TABLET | Refills: 0 | Status: SHIPPED | OUTPATIENT
Start: 2025-01-29

## 2025-03-14 ENCOUNTER — RA CDI HCC (OUTPATIENT)
Dept: OTHER | Facility: HOSPITAL | Age: 70
End: 2025-03-14

## 2025-03-21 ENCOUNTER — APPOINTMENT (OUTPATIENT)
Dept: LAB | Facility: CLINIC | Age: 70
End: 2025-03-21
Payer: COMMERCIAL

## 2025-03-21 ENCOUNTER — OFFICE VISIT (OUTPATIENT)
Dept: FAMILY MEDICINE CLINIC | Facility: CLINIC | Age: 70
End: 2025-03-21
Payer: COMMERCIAL

## 2025-03-21 VITALS
SYSTOLIC BLOOD PRESSURE: 130 MMHG | RESPIRATION RATE: 18 BRPM | WEIGHT: 175.6 LBS | HEART RATE: 59 BPM | HEIGHT: 74 IN | OXYGEN SATURATION: 99 % | BODY MASS INDEX: 22.54 KG/M2 | TEMPERATURE: 97.3 F | DIASTOLIC BLOOD PRESSURE: 86 MMHG

## 2025-03-21 DIAGNOSIS — F32.A CHRONIC DEPRESSIVE DISORDER: ICD-10-CM

## 2025-03-21 DIAGNOSIS — E78.2 MIXED HYPERLIPIDEMIA: Chronic | ICD-10-CM

## 2025-03-21 DIAGNOSIS — R63.4 WEIGHT LOSS, NON-INTENTIONAL: ICD-10-CM

## 2025-03-21 DIAGNOSIS — G40.209 COMPLEX PARTIAL SEIZURE EVOLVING TO GENERALIZED SEIZURE (HCC): Primary | Chronic | ICD-10-CM

## 2025-03-21 DIAGNOSIS — G89.4 CHRONIC PAIN DISORDER: ICD-10-CM

## 2025-03-21 DIAGNOSIS — I71.21 ANEURYSM OF ASCENDING AORTA WITHOUT RUPTURE (HCC): ICD-10-CM

## 2025-03-21 DIAGNOSIS — R97.20 INCREASED PROSTATE SPECIFIC ANTIGEN (PSA) VELOCITY: ICD-10-CM

## 2025-03-21 LAB
ALBUMIN SERPL BCG-MCNC: 4.4 G/DL (ref 3.5–5)
ALP SERPL-CCNC: 66 U/L (ref 34–104)
ALT SERPL W P-5'-P-CCNC: 17 U/L (ref 7–52)
ANION GAP SERPL CALCULATED.3IONS-SCNC: 8 MMOL/L (ref 4–13)
AST SERPL W P-5'-P-CCNC: 25 U/L (ref 13–39)
BILIRUB SERPL-MCNC: 0.43 MG/DL (ref 0.2–1)
BUN SERPL-MCNC: 21 MG/DL (ref 5–25)
CALCIUM SERPL-MCNC: 9.2 MG/DL (ref 8.4–10.2)
CHLORIDE SERPL-SCNC: 102 MMOL/L (ref 96–108)
CHOLEST SERPL-MCNC: 148 MG/DL (ref ?–200)
CO2 SERPL-SCNC: 28 MMOL/L (ref 21–32)
CREAT SERPL-MCNC: 0.81 MG/DL (ref 0.6–1.3)
ERYTHROCYTE [DISTWIDTH] IN BLOOD BY AUTOMATED COUNT: 13.1 % (ref 11.6–15.1)
GFR SERPL CREATININE-BSD FRML MDRD: 91 ML/MIN/1.73SQ M
GLUCOSE P FAST SERPL-MCNC: 96 MG/DL (ref 65–99)
HCT VFR BLD AUTO: 42.5 % (ref 36.5–49.3)
HDLC SERPL-MCNC: 48 MG/DL
HGB BLD-MCNC: 13.5 G/DL (ref 12–17)
LDLC SERPL CALC-MCNC: 87 MG/DL (ref 0–100)
MCH RBC QN AUTO: 31.1 PG (ref 26.8–34.3)
MCHC RBC AUTO-ENTMCNC: 31.8 G/DL (ref 31.4–37.4)
MCV RBC AUTO: 98 FL (ref 82–98)
PLATELET # BLD AUTO: 232 THOUSANDS/UL (ref 149–390)
PMV BLD AUTO: 10.6 FL (ref 8.9–12.7)
POTASSIUM SERPL-SCNC: 4.7 MMOL/L (ref 3.5–5.3)
PROT SERPL-MCNC: 7.1 G/DL (ref 6.4–8.4)
PSA SERPL-MCNC: 3.36 NG/ML (ref 0–4)
RBC # BLD AUTO: 4.34 MILLION/UL (ref 3.88–5.62)
SODIUM SERPL-SCNC: 138 MMOL/L (ref 135–147)
T4 FREE SERPL-MCNC: 0.73 NG/DL (ref 0.61–1.12)
TRIGL SERPL-MCNC: 66 MG/DL (ref ?–150)
TSH SERPL DL<=0.05 MIU/L-ACNC: 3.17 UIU/ML (ref 0.45–4.5)
WBC # BLD AUTO: 6.32 THOUSAND/UL (ref 4.31–10.16)

## 2025-03-21 PROCEDURE — 36415 COLL VENOUS BLD VENIPUNCTURE: CPT

## 2025-03-21 PROCEDURE — 99214 OFFICE O/P EST MOD 30 MIN: CPT | Performed by: FAMILY MEDICINE

## 2025-03-21 PROCEDURE — 80053 COMPREHEN METABOLIC PANEL: CPT

## 2025-03-21 PROCEDURE — 84153 ASSAY OF PSA TOTAL: CPT

## 2025-03-21 PROCEDURE — 80061 LIPID PANEL: CPT

## 2025-03-21 PROCEDURE — 84443 ASSAY THYROID STIM HORMONE: CPT

## 2025-03-21 PROCEDURE — G2211 COMPLEX E/M VISIT ADD ON: HCPCS | Performed by: FAMILY MEDICINE

## 2025-03-21 PROCEDURE — 85027 COMPLETE CBC AUTOMATED: CPT

## 2025-03-21 PROCEDURE — 84439 ASSAY OF FREE THYROXINE: CPT

## 2025-03-21 NOTE — ASSESSMENT & PLAN NOTE
Patient is under care of neurology.  Continue Keppra  Orders:  •  Ambulatory referral to social work care management program; Future

## 2025-03-21 NOTE — PATIENT INSTRUCTIONS
12-hour fasting blood work today  I will ask social workers to contact you  Please check medications that you have at home and match them to your med list  CT chest will be due in December 2025-ordered by cardiology, you can call now

## 2025-03-21 NOTE — ASSESSMENT & PLAN NOTE
Proceed with blood work today.  Continue rosuvastatin 10 mg daily  Orders:  •  Comprehensive metabolic panel; Future  •  Lipid Panel with Direct LDL reflex; Future

## 2025-03-21 NOTE — PROGRESS NOTES
Name: Kwaku Castano      : 1955      MRN: [id number]  Encounter Provider: Amanda Moreno MD  Encounter Date: 3/21/2025   Encounter department: [facility]    Assessment & Plan  Complex partial seizure evolving to generalized seizure (HCC)  Patient is under care of neurology.  Continue Keppra  Orders:  •  Ambulatory referral to social work care management program; Future    Mixed hyperlipidemia  Proceed with blood work today.  Continue rosuvastatin 10 mg daily  Orders:  •  Comprehensive metabolic panel; Future  •  Lipid Panel with Direct LDL reflex; Future    Aneurysm of ascending aorta without rupture (HCC)  Patient will be due for CT chest in 2025       Chronic pain disorder  Patient manages chronic arthritic and back/neck pain with regimen of meloxicam and gabapentin       Chronic depressive disorder  Depression Screening Follow-up Plan: Patient's depression screening was positive with a PHQ-9 score of 8.  Patient with longstanding history of depression/anhedonia.  Significant family related stress along with ongoing financial stressors are is likely significant  trigger.  He has repetitively denied need for medications.  Request  consult.    Patient declines further evaluation by mental health professional and/or medications. They have no active suicidal ideations. Brief counseling provided and recommend additional follow-up/re-evaluation at next office visit. Patient advised to follow-up with PCP for further management.        Orders:  •  Ambulatory referral to social work care management program; Future      Depression Screening and Follow-up Plan: Patient's depression screening was positive with a PHQ-9 score of 8.   Patient declines further evaluation by mental health professional and/or medications. Brief counseling provided. Will re-evaluate at next office visit. Patient advised to follow-up with PCP for further management.       Patient Instructions    12-hour fasting blood work today  I will ask social workers to contact you  Please check medications that you have at home and match them to your med list  CT chest will be due in December 2025-ordered by cardiology, you can call now    Return in about 27 weeks (around 9/26/2025) for Medicare wellness.      Please a consult to  to help patient with all eligible benefit  History of Present Illness     Follow-up chronic medical conditions.  Patient is due for blood work today.  Previous labs indicated mild elevation of TSH.  Patient denies symptoms of unusual fatigue or weight gain.  His bowel movements are regular.  He is sleeping poorly.  Manages chronic pain with regimen of gabapentin and meloxicam.  Medications updated.  Patient has been using mail away pharmacy and will double check availability of meloxicam at home.  He was evaluated by chiropractor, not interested in PT.      Weight is stable.  No complaints of chest pain, palpitations, shortness of breath or dizziness.         Medication Refill      Review of Systems   Constitutional: Negative.    Respiratory: Negative.     Cardiovascular: Negative.    Genitourinary: Negative.    Neurological: Negative.      Past Medical History:   Diagnosis Date   • Aneurysm, ascending aorta (HCC)    • Anxiety disorder    • Arthritis     Last assessed: 11/17/16   • Asthma    • DDD (degenerative disc disease), cervical    • Depression    • Hyperlipidemia    • Hypertension    • Multiple fractures of ribs, left side, init for clos fx 11/15/2020   • Seizures (HCC)    • Stroke syndrome    • Syncope 07/24/2024     Past Surgical History:   Procedure Laterality Date   • ANKLE SURGERY     • COLONOSCOPY  2009    Due 2014   • HERNIA REPAIR     • ROOT CANAL     • TOTAL HIP ARTHROPLASTY Left 02/2014   • TOTAL HIP ARTHROPLASTY Right      Family History   Problem Relation Age of Onset   • Anxiety disorder Mother         Symptom/disorder   • Hypertension Mother         Benign  Essential   • Mitral valve prolapse Mother         Echo/Systolic   • PABLO disease Mother    • Fibromyalgia Mother    • Hyperlipidemia Mother    • Diabetes Father         Mellitus   • Cancer Brother    • Stroke Family         CVA   • Cancer Family    • Sudden death Family         Instantaneous Cardiac Death   • Other Family         Connective Tissue Defect     Social History     Tobacco Use   • Smoking status: Former   • Smokeless tobacco: Never   Vaping Use   • Vaping status: Former   • Quit date: 1/1/2011   Substance and Sexual Activity   • Alcohol use: Not Currently     Comment: Occasionally   • Drug use: Yes     Types: Marijuana     Comment: medical marijuana   • Sexual activity: Not on file     Current Outpatient Medications on File Prior to Visit   Medication Sig   • aspirin 81 MG tablet Take 81 mg by mouth daily.   • CANNABIDIOL PO Take by mouth   • Cholecalciferol (VITAMIN D3) 2000 units TABS Take 2,000 Units by mouth daily   • cyanocobalamin (VITAMIN B-12) 500 MCG tablet Take 1 tablet (500 mcg total) by mouth daily   • ferrous sulfate 324 (65 Fe) mg Take 1 tablet (324 mg total) by mouth daily   • gabapentin (NEURONTIN) 600 MG tablet Take 1 tablet (600 mg total) by mouth 2 (two) times a day   • levETIRAcetam (KEPPRA) 500 mg tablet Take 2 tablets (1,000 mg total) by mouth every 12 (twelve) hours   • meloxicam (MOBIC) 15 mg tablet TAKE ONE TABLET BY MOUTH EVERY DAY WITH FOOD AS NEEDED FOR LOWER BACK PAIN.   • rosuvastatin (CRESTOR) 10 MG tablet Take 1 tablet (10 mg total) by mouth daily   • chlorhexidine (PERIDEX) 0.12 % solution RINSE WITH 1/2 FLUID OUNCE TWICE A DAY FOR 30 SECONDS AFTER MEALS FOR 21 DAYS (Patient not taking: Reported on 10/10/2023)     Allergies   Allergen Reactions   • Gluten Meal - Food Allergy Other (See Comments)     UNKNOWN     Immunization History   Administered Date(s) Administered   • COVID-19 MODERNA VACC 0.5 ML IM 03/25/2021, 04/22/2021, 11/04/2021   • COVID-19 Pfizer Vac BIVALENT  "Gonzales-sucrose 12 Yr+ IM 10/05/2022   • COVID-19 Pfizer mRNA vacc PF gonzales-sucrose 12 yr and older (Comirnaty) 09/25/2024   • INFLUENZA 10/05/2022   • Influenza Split High Dose Preservative Free IM 09/25/2024   • Influenza, high dose seasonal 0.7 mL 09/14/2023   • Influenza, recombinant, quadrivalent,injectable, preservative free 11/11/2020   • Influenza, seasonal, injectable 10/09/2014, 09/16/2015   • Pneumococcal Conjugate Vaccine 20-valent (Pcv20), Polysace 03/09/2023   • Tdap 11/14/2020   • Zoster Vaccine Recombinant 03/09/2023, 09/14/2023   • influenza, injectable, quadrivalent 10/29/2019     Objective   /86 (BP Location: Left arm, Patient Position: Sitting, Cuff Size: Large)   Pulse 59   Temp (!) 97.3 °F (36.3 °C) (Temporal)   Resp 18   Ht 6' 2\" (1.88 m)   Wt 79.7 kg (175 lb 9.6 oz)   SpO2 99%   BMI 22.55 kg/m²     Physical Exam  Vitals and nursing note reviewed.   Constitutional:       General: He is not in acute distress.     Appearance: Normal appearance. He is not ill-appearing.   HENT:      Head: Normocephalic and atraumatic.   Eyes:      General: No scleral icterus.     Conjunctiva/sclera: Conjunctivae normal.   Neck:      Vascular: No carotid bruit.   Cardiovascular:      Rate and Rhythm: Normal rate and regular rhythm.      Heart sounds: Normal heart sounds. No murmur heard.  Pulmonary:      Effort: Pulmonary effort is normal. No respiratory distress.      Breath sounds: Normal breath sounds.   Abdominal:      General: Bowel sounds are normal. There is no distension.      Palpations: Abdomen is soft.   Musculoskeletal:         General: Normal range of motion.      Cervical back: Neck supple. No rigidity.      Right lower leg: No edema.      Left lower leg: No edema.   Skin:     General: Skin is warm.      Findings: No rash.   Neurological:      General: No focal deficit present.      Mental Status: He is alert and oriented to person, place, and time.      Cranial Nerves: No cranial nerve " deficit.   Psychiatric:         Mood and Affect: Mood normal.         Behavior: Behavior normal.         Thought Content: Thought content normal.

## 2025-03-22 ENCOUNTER — RESULTS FOLLOW-UP (OUTPATIENT)
Dept: FAMILY MEDICINE CLINIC | Facility: CLINIC | Age: 70
End: 2025-03-22

## 2025-03-23 PROBLEM — R55 SYNCOPE: Status: RESOLVED | Noted: 2024-07-24 | Resolved: 2025-03-23

## 2025-03-23 NOTE — ASSESSMENT & PLAN NOTE
Depression Screening Follow-up Plan: Patient's depression screening was positive with a PHQ-9 score of 8.  Patient with longstanding history of depression/anhedonia.  Significant family related stress along with ongoing financial stressors are is likely significant  trigger.  He has repetitively denied need for medications.  Request  consult.    Patient declines further evaluation by mental health professional and/or medications. They have no active suicidal ideations. Brief counseling provided and recommend additional follow-up/re-evaluation at next office visit. Patient advised to follow-up with PCP for further management.        Orders:  •  Ambulatory referral to social work care management program; Future

## 2025-03-26 ENCOUNTER — PATIENT OUTREACH (OUTPATIENT)
Dept: CASE MANAGEMENT | Facility: OTHER | Age: 70
End: 2025-03-26

## 2025-03-26 NOTE — PROGRESS NOTES
Received referral from patients PCP Amanda Moreno MD requesting that [facility] outreach patient and assess for needs. Per chart review, patient had PCP follow up visit on 03/21. Patient had positive PHQ-9 score of 8.    SW spoke with patient, introduced role and reason for call. Patient reported doing well. Discussed SW referral to assess for needs. Patient stated needing financial assistance. Reported that he does receive SNAP benefits, however not usually enough to last the whole month. [facility] offered to send patient food resources and patient reported that he is able to walk to one local food bank. [facility] will send patient additional food resources.    Patient does not have transportation and usually walks to the bank and grocery store as they are walking distance from his home. Reported needing assistance with transportation to appointments as they are a bit more farther. Discussed Mari and patient was interested; approved to have CMOC reach out and assist with application process.     Patient is on a fixed income and receives SS. Lives alone, has Met-ed and interested in any programs for low income homes to help with bills. [facility]. Patient does qualify for programs; [facility] will IB CMOC regarding which program would be best for patient as LIHEAP season may be closed.    Patient reported no other needs at this time and has [facility] contact information if other needs arise. Patient was enrolled to  Program and CMOC was added to assist with applications.

## 2025-03-27 ENCOUNTER — PATIENT OUTREACH (OUTPATIENT)
Dept: CASE MANAGEMENT | Facility: OTHER | Age: 70
End: 2025-03-27

## 2025-03-27 NOTE — PROGRESS NOTES
CMOC received patient from Outreach Pool to assist with Belkis and ALIX. CMOC sent SW Yuli and IB to be added to care plan tasks.     CMOC called patient and left VM introducing self, role, reason for call, & detailed information for a return call.     Awaiting response.       Next Outreach 3/31

## 2025-03-31 ENCOUNTER — PATIENT OUTREACH (OUTPATIENT)
Dept: CASE MANAGEMENT | Facility: OTHER | Age: 70
End: 2025-03-31

## 2025-03-31 NOTE — PROGRESS NOTES
CMOC made second attempt to outreach patient.     Patient answered CMOC's call and asked if he could call this writer back.     CMOC waiting return call.     Next Outreach 4/7    ADDENDUM:    Patient called CMOC back. CMOC got answers for the LIHEP and LantaVan applications.     CMOC explained the process of both applications.     SSR# [id number].     CMOC tried to apply for LIHEAP via [employer] website and could not log on. CMOC advised will keep trying. CMOC advised that will call information that will be need to be returned for verification for the LIHEAP application. CMOC advised will be able to take to [facility] once patient has information gathered.     Next Outreach 4/7    ADDENDUM 2: [facility] was able to complete LIHEAP application online via [employer]. W#[id number]. CM will call patient and advise that a copy of most recent utility bill and copy of lease are required for LIHEAP. Patient understood and will send this writer the information when he has it.

## 2025-04-04 DIAGNOSIS — G40.209 COMPLEX PARTIAL SEIZURE EVOLVING TO GENERALIZED SEIZURE (HCC): Chronic | ICD-10-CM

## 2025-04-04 RX ORDER — LEVETIRACETAM 500 MG/1
TABLET ORAL
Qty: 360 TABLET | Refills: 3 | Status: SHIPPED | OUTPATIENT
Start: 2025-04-04

## 2025-04-07 ENCOUNTER — PATIENT OUTREACH (OUTPATIENT)
Dept: CASE MANAGEMENT | Facility: OTHER | Age: 70
End: 2025-04-07

## 2025-04-07 NOTE — PROGRESS NOTES
CMOC placed call to patient to check in and see if he was able to get what is needed for the LIHEAP. Patient needs copy of utility bill and lease.     Patient advised that he has not gotten the lease from the [facility] yet. He does have the utility bill. CMOC reminded patient that this information needs to be at the LIND by 4/30.     [facility] also advised patient that [employer] application has come back approved and will be getting information in the mail regarding service and setting up an Eco-Pay Account for trip payments.     Patient thanked [facility] for help and advised will work on getting lease.     Next Outreach 4/14

## 2025-04-08 ENCOUNTER — PATIENT OUTREACH (OUTPATIENT)
Dept: CASE MANAGEMENT | Facility: OTHER | Age: 70
End: 2025-04-08

## 2025-04-08 NOTE — PROGRESS NOTES
Received routed note from CMOC Angelique regarding case. CMOC assisting patient with Lanta and LIHEAP. Patient was approved for Lantavan and aware that he will receive info in the mail regarding service.     Patient needs copy of utility bill and lease to submit to [facility] by 04/30. Patient aware; has utility bill and waiting for lease from [facility].     [facility] will continue to follow case and assist as needed.

## 2025-04-14 ENCOUNTER — PATIENT OUTREACH (OUTPATIENT)
Dept: CASE MANAGEMENT | Facility: OTHER | Age: 70
End: 2025-04-14

## 2025-04-14 NOTE — PROGRESS NOTES
CMOC place call to patient to check and see if he was able to get a copy of his lease needed for the LIHEAP Application.     Patient advised he was not yet able to get that.     CMOC encouraged patient to get it ASAP as it does need to be in at [facility] by 4/30.     Patient understood and agreed.     Next Outreach 4/21

## 2025-04-15 ENCOUNTER — TELEPHONE (OUTPATIENT)
Dept: NEUROLOGY | Facility: CLINIC | Age: 70
End: 2025-04-15

## 2025-04-15 ENCOUNTER — OFFICE VISIT (OUTPATIENT)
Dept: NEUROLOGY | Facility: CLINIC | Age: 70
End: 2025-04-15
Payer: COMMERCIAL

## 2025-04-15 VITALS
BODY MASS INDEX: 22.59 KG/M2 | HEART RATE: 66 BPM | HEIGHT: 74 IN | WEIGHT: 176 LBS | DIASTOLIC BLOOD PRESSURE: 60 MMHG | SYSTOLIC BLOOD PRESSURE: 98 MMHG | OXYGEN SATURATION: 96 %

## 2025-04-15 DIAGNOSIS — G40.209 COMPLEX PARTIAL SEIZURE EVOLVING TO GENERALIZED SEIZURE (HCC): Chronic | ICD-10-CM

## 2025-04-15 PROCEDURE — G2211 COMPLEX E/M VISIT ADD ON: HCPCS | Performed by: PSYCHIATRY & NEUROLOGY

## 2025-04-15 PROCEDURE — 99214 OFFICE O/P EST MOD 30 MIN: CPT | Performed by: PSYCHIATRY & NEUROLOGY

## 2025-04-15 RX ORDER — LEVETIRACETAM 500 MG/1
1000 TABLET ORAL EVERY 12 HOURS SCHEDULED
Qty: 360 TABLET | Refills: 3 | Status: SHIPPED | OUTPATIENT
Start: 2025-04-15

## 2025-04-15 NOTE — ASSESSMENT & PLAN NOTE
Overall he continues to be seizure-free on levetiracetam monotherapy.  We discussed that his levels were low when taking levetiracetam at 500 mg twice a day and he did have another atypical event when on the lower dose around 10 years ago, which is why his dose was increased to be 1000 mg twice a day.  He did not have any problems with the higher dose, and believes his taking less was simply an error.  He will plan on returning to his previously prescribed dose of levetiracetam.  Given his prior traumatic brain injury, he will likely benefit from staying on seizure medications lifelong    --I will have him increase his dose of levetiracetam back up to the prescribed dose of 1000 mg twice a day.  If he would have additional seizures while on this higher dose of medication, his dose still could be increased further  Orders:    levETIRAcetam (KEPPRA) 500 mg tablet; Take 2 tablets (1,000 mg total) by mouth every 12 (twelve) hours

## 2025-04-15 NOTE — TELEPHONE ENCOUNTER
YEIMI spoke with Iris, , in [place] to advise that this writer will need to arrange a Lyft ride home as [employer] (which patient was recently approved for) does not offer any same day service. Iris will have patient sign Lyft waiver and will confirm that the address on file is where patient will be traveling back to. MSW will then arrange a one-way trip via Round Trip.     YEIMI phoned [employer] at [phone], option 3, and spoke with Blossom. She stated that patient is registered under their Senior Shared Ride Program and that he will need to pay fares for his trips. Blossom state that patient has an Eco Pay Account, but there is no money in it.     YEIMI phoned patient's insurance at [phone] and spoke with Argelia to inquire if patient has any routine transportation benefits for medical appts. Argelia stated that patient does NOT have routine transportation benefits. Reference # for call is [id number].    YEIMI will arrange a Lyft home once patient is ready to leave the appt this date.

## 2025-04-15 NOTE — TELEPHONE ENCOUNTER
MSW arranged a Lyft ride home for patient via Round Trip. MSW phoned patient at [phone] to make him aware of /vehicle info as follows:    [address]  [address]   [address]   [id number]    MSW offered to call patient back later on today or tomorrow to review his eligibility with [employer] and educate patient on how to use same. Patient requested a callback on 4/16 as he is tired and plans to crash when he gets home.     While this writer was on the phone with patient, his  arrived. Patient is en route home from [facility].     MSW will follow-up with patient on 4/16 as per his request.

## 2025-04-15 NOTE — TELEPHONE ENCOUNTER
----- Message from Kenia BRANDON sent at 4/15/2025  9:20 AM EDT -----  [facility] team!     Would there be any way we can get a ride arranged for this patient? He walked to his 10 am appointment this morning.

## 2025-04-15 NOTE — PATIENT INSTRUCTIONS
-- Please increase your Levetiracetam to be 2 tabs (1000 mg) twice a day     -- Call our office if any problems arise.

## 2025-04-15 NOTE — PROGRESS NOTES
Name: Kwaku Castano      : 1955      MRN: [id number]  Encounter Provider: Rambo Roberto MD  Encounter Date: 4/15/2025   Encounter department: NEUROLOGY [facility]  :  Assessment & Plan  Complex partial seizure evolving to generalized seizure (HCC)  Overall he continues to be seizure-free on levetiracetam monotherapy.  We discussed that his levels were low when taking levetiracetam at 500 mg twice a day and he did have another atypical event when on the lower dose around 10 years ago, which is why his dose was increased to be 1000 mg twice a day.  He did not have any problems with the higher dose, and believes his taking less was simply an error.  He will plan on returning to his previously prescribed dose of levetiracetam.  Given his prior traumatic brain injury, he will likely benefit from staying on seizure medications lifelong    --I will have him increase his dose of levetiracetam back up to the prescribed dose of 1000 mg twice a day.  If he would have additional seizures while on this higher dose of medication, his dose still could be increased further  Orders:    levETIRAcetam (KEPPRA) 500 mg tablet; Take 2 tablets (1,000 mg total) by mouth every 12 (twelve) hours        He will Return in about 1 year (around 4/15/2026).      History of Present Illness   HPI  Kwaku Castano is a 69 y.o. male with prior brain injury and epilepsy, who is returning to Neurology office for follow up of his seizures.     Current seizure medications:  1. Levetiracetam 500 mg twice a day (prescribed 1000 mg twice a day, but patient taking less than prescribed, as below)  Other medications as per [facility].    Since his last visit, he has continued to be seizure free.  He denies any side effects of levetiracetam and overall continues to do well.  He is taking less of the levetiracetam than prescribed.  He is unclear when he started taking the lower dose, but has only been taking 500 mg twice a day.  He will still  "occasionally miss some doses.  Despite this he has not had any additional seizures and has still been doing well.    Prior Seizure Medications: Phenytoin (stopped in the late 80s due to prolonged period of seizure freedom)    Review of Systems  I personally reviewed the review of systems that was entered by the medical assistant in a separate note       Objective   BP 98/60 (BP Location: Left arm, Patient Position: Sitting, Cuff Size: Large)   Pulse 66   Ht 6' 2\" (1.88 m)   Wt 79.8 kg (176 lb)   SpO2 96%   BMI 22.60 kg/m²      Physical Exam    Voice recognition software was used in the generation of this note. There may be unintentional errors including grammatical errors, spelling errors, or pronoun errors.    :  "

## 2025-04-15 NOTE — PROGRESS NOTES
Review of Systems   Constitutional:  Negative for appetite change, fatigue and fever.   HENT: Negative.  Negative for hearing loss, tinnitus, trouble swallowing and voice change.    Eyes: Negative.  Negative for photophobia, pain and visual disturbance.   Respiratory: Negative.  Negative for shortness of breath.    Cardiovascular: Negative.  Negative for palpitations.   Gastrointestinal: Negative.  Negative for nausea and vomiting.   Endocrine: Negative.  Negative for cold intolerance.   Genitourinary: Negative.  Negative for dysuria, frequency and urgency.   Musculoskeletal:  Positive for joint swelling (joint pain). Negative for back pain, gait problem, myalgias, neck pain and neck stiffness.   Skin: Negative.  Negative for rash.   Allergic/Immunologic: Negative.    Neurological:  Negative for dizziness, tremors, seizures, syncope, facial asymmetry, speech difficulty, weakness, light-headedness, numbness and headaches.   Hematological: Negative.  Does not bruise/bleed easily.   Psychiatric/Behavioral: Negative.  Negative for confusion, hallucinations and sleep disturbance.

## 2025-04-17 ENCOUNTER — PATIENT OUTREACH (OUTPATIENT)
Dept: CASE MANAGEMENT | Facility: OTHER | Age: 70
End: 2025-04-17

## 2025-04-17 NOTE — PROGRESS NOTES
Received routed IB from [facility] Angelique regarding case. [facility] spoke with patient on 4/14 for lease needed to complete LIHEAP application. Patient unable to get lease just yet and aware he will need to submit to LIND by 4/30.    SWCM will continue to follow and assist as needed.

## 2025-04-21 ENCOUNTER — PATIENT OUTREACH (OUTPATIENT)
Dept: CASE MANAGEMENT | Facility: OTHER | Age: 70
End: 2025-04-21

## 2025-04-21 NOTE — PROGRESS NOTES
CMOC performed Website review on [employer] for LIHEAP Application.     CMOC notes that application is still in progress.     CMOC called patient and left a message that copy of bill and lease are due back to LIND by 4/30 or LIND will deny application. Left information for a return call.     Awaiting response    Next Outreach 4/28

## 2025-04-28 ENCOUNTER — PATIENT OUTREACH (OUTPATIENT)
Dept: CASE MANAGEMENT | Facility: OTHER | Age: 70
End: 2025-04-28

## 2025-04-28 NOTE — PROGRESS NOTES
CMOC called and left  stating that a copy of patients lease needs to be to [facility] by 4/30/25 to have the HemosphereP application processed.     CMOC sent UTR letter via [employer].     Next Outreach 4/30

## 2025-04-28 NOTE — LETTER
04/28/25    Dear Kwaku Castano,    I am a Community Health Worker with [employer]  [facility]NA[facility]  [address] [address] [phone]    If you would like to have your application for [facility] get processed please send a copy of your lease to [facility] by 4/30/25. I have called twice and left messages for you with that information.     Sincerely,     Angelique Barros

## 2025-04-29 ENCOUNTER — PATIENT OUTREACH (OUTPATIENT)
Dept: CASE MANAGEMENT | Facility: OTHER | Age: 70
End: 2025-04-29

## 2025-04-29 NOTE — PROGRESS NOTES
Received routed note from [facility] Angelique regarding case. [facility] was unable to reach patient regarding a copy of patients lease being sent to [facility] for IotumP application to be processed. UTR letter was sent.     [facility] will follow up with patient if [facility] does not receive return call as case will be closed.

## 2025-04-30 ENCOUNTER — PATIENT OUTREACH (OUTPATIENT)
Dept: CASE MANAGEMENT | Facility: OTHER | Age: 70
End: 2025-04-30

## 2025-04-30 NOTE — PROGRESS NOTES
CMOC performed chart and [employer] website review.     Patient did not contact CMOC     No engagement from patient.     CMOC to close

## 2025-05-01 ENCOUNTER — PATIENT OUTREACH (OUTPATIENT)
Dept: CASE MANAGEMENT | Facility: OTHER | Age: 70
End: 2025-05-01

## 2025-05-01 NOTE — LETTER
[address] [address]  [phone]    Re: Unable to reach    5/1/2025       Dear Kwaku,    I am a [facility]  and wanted to be certain you had information to contact me should you desire assistance with or have questions about non-medical aspects of your care such as [but not limited to] medical insurance, housing, transportation, material needs, or emergency needs.  If I do not have an answer I will assist you in finding the appropriate agency or individual who can help.      Please feel free to contact me at [phone]. Thank You.    Sincerely,         Lupe Nguyen

## 2025-05-01 NOTE — PROGRESS NOTES
Received IB from [facility] Angelique regarding case. [facility] was unable to reach patient and UTR letter was sent on 4/28. Patient did not contact [facility] to complete LIHEAP application and deadline was missed. [facility].    [facility] attempted outreaching patient to assist with other possible resources. No answer and left message. UTR letter was sent.

## 2025-05-28 DIAGNOSIS — M51.369 DDD (DEGENERATIVE DISC DISEASE), LUMBAR: ICD-10-CM

## 2025-05-28 DIAGNOSIS — E78.5 HYPERLIPIDEMIA, UNSPECIFIED HYPERLIPIDEMIA TYPE: ICD-10-CM

## 2025-05-28 RX ORDER — MELOXICAM 15 MG/1
TABLET ORAL
Qty: 90 TABLET | Refills: 1 | Status: SHIPPED | OUTPATIENT
Start: 2025-05-28

## 2025-05-28 RX ORDER — ROSUVASTATIN CALCIUM 10 MG/1
10 TABLET, COATED ORAL DAILY
Qty: 90 TABLET | Refills: 1 | Status: SHIPPED | OUTPATIENT
Start: 2025-05-28

## 2025-05-28 NOTE — TELEPHONE ENCOUNTER
Reason for call:   [x] Refill   [] Prior Auth  [] Other:     Office:   [x] PCP/Provider - QUINN DUVALL  Authorized By: Amanda Moreno MD  [] Specialty/Provider -     meloxicam (MOBIC) 15 mg tablet TAKE ONE TABLET BY MOUTH EVERY DAY WITH FOOD AS NEEDED FOR LOWER BACK PAIN     rosuvastatin (CRESTOR) 10 MG tablet  Take 1 tablet (10 mg total) by mouth daily     Pharmacy: [facility] - [address]     Local Pharmacy   Does the patient have enough for 3 days?   [] Yes   [] No - Send as HP to [facility]    Mail Away Pharmacy   Does the patient have enough for 10 days?   [] Yes   [x] No - Send as HP to [facility]-pharmacy reports patient has 2 day supply left

## 2025-07-03 DIAGNOSIS — M51.369 DDD (DEGENERATIVE DISC DISEASE), LUMBAR: ICD-10-CM

## 2025-07-04 RX ORDER — GABAPENTIN 600 MG/1
TABLET ORAL
Qty: 180 TABLET | Refills: 11 | Status: SHIPPED | OUTPATIENT
Start: 2025-07-04

## 2025-07-11 DIAGNOSIS — D50.9 IRON DEFICIENCY ANEMIA, UNSPECIFIED IRON DEFICIENCY ANEMIA TYPE: ICD-10-CM

## 2025-07-11 DIAGNOSIS — R63.4 WEIGHT LOSS, NON-INTENTIONAL: ICD-10-CM

## 2025-07-14 RX ORDER — MULTIVITAMIN WITH IRON
TABLET ORAL
Qty: 100 TABLET | Refills: 1 | Status: SHIPPED | OUTPATIENT
Start: 2025-07-14

## 2025-07-14 RX ORDER — FERROUS SULFATE 324(65)MG
TABLET, DELAYED RELEASE (ENTERIC COATED) ORAL
Qty: 90 TABLET | Refills: 1 | Status: SHIPPED | OUTPATIENT
Start: 2025-07-14

## 2025-08-19 ENCOUNTER — TELEPHONE (OUTPATIENT)
Dept: CARDIOLOGY CLINIC | Facility: CLINIC | Age: 70
End: 2025-08-19